# Patient Record
Sex: MALE | Race: WHITE | NOT HISPANIC OR LATINO | Employment: FULL TIME | URBAN - METROPOLITAN AREA
[De-identification: names, ages, dates, MRNs, and addresses within clinical notes are randomized per-mention and may not be internally consistent; named-entity substitution may affect disease eponyms.]

---

## 2017-07-07 ENCOUNTER — OFFICE VISIT (OUTPATIENT)
Dept: INTERNAL MEDICINE | Facility: CLINIC | Age: 47
End: 2017-07-07
Payer: COMMERCIAL

## 2017-07-07 ENCOUNTER — TELEPHONE (OUTPATIENT)
Dept: INTERNAL MEDICINE | Facility: CLINIC | Age: 47
End: 2017-07-07

## 2017-07-07 ENCOUNTER — PATIENT MESSAGE (OUTPATIENT)
Dept: INTERNAL MEDICINE | Facility: CLINIC | Age: 47
End: 2017-07-07

## 2017-07-07 VITALS
HEIGHT: 75 IN | DIASTOLIC BLOOD PRESSURE: 74 MMHG | WEIGHT: 208.56 LBS | TEMPERATURE: 98 F | SYSTOLIC BLOOD PRESSURE: 132 MMHG | BODY MASS INDEX: 25.93 KG/M2 | HEART RATE: 79 BPM | OXYGEN SATURATION: 99 %

## 2017-07-07 DIAGNOSIS — E11.9 TYPE 2 DIABETES MELLITUS WITHOUT COMPLICATION, WITHOUT LONG-TERM CURRENT USE OF INSULIN: ICD-10-CM

## 2017-07-07 DIAGNOSIS — L23.7 POISON IVY DERMATITIS: Primary | ICD-10-CM

## 2017-07-07 DIAGNOSIS — I10 ESSENTIAL HYPERTENSION: ICD-10-CM

## 2017-07-07 PROCEDURE — 4010F ACE/ARB THERAPY RXD/TAKEN: CPT | Mod: S$GLB,,, | Performed by: INTERNAL MEDICINE

## 2017-07-07 PROCEDURE — 99214 OFFICE O/P EST MOD 30 MIN: CPT | Mod: S$GLB,,, | Performed by: INTERNAL MEDICINE

## 2017-07-07 PROCEDURE — 3044F HG A1C LEVEL LT 7.0%: CPT | Mod: S$GLB,,, | Performed by: INTERNAL MEDICINE

## 2017-07-07 PROCEDURE — 99999 PR PBB SHADOW E&M-EST. PATIENT-LVL IV: CPT | Mod: PBBFAC,,, | Performed by: INTERNAL MEDICINE

## 2017-07-07 RX ORDER — PREDNISONE 20 MG/1
TABLET ORAL
Qty: 14 TABLET | Refills: 0 | Status: SHIPPED | OUTPATIENT
Start: 2017-07-07 | End: 2017-07-07 | Stop reason: SDUPTHER

## 2017-07-07 RX ORDER — PREDNISONE 20 MG/1
TABLET ORAL
Qty: 32 TABLET | Refills: 0
Start: 2017-07-07 | End: 2017-07-22

## 2017-07-07 NOTE — LETTER
July 7, 2017      Fadumo Rasheed MD  [address]  [facility] [address]           [facility] Medicine  [address]  [facility] [address]  Phone: [phone]  Fax: [phone]          Patient: Filiberto Cohn   MR Number: [id number]   YOB: 1970   Date of Visit: 7/7/2017       Dear Dr. Fadumo Rasheed:    Thank you for referring Filiberto Cohn to me for evaluation. Attached you will find relevant portions of my assessment and plan of care.    If you have questions, please do not hesitate to call me. I look forward to following Filiberto Cohn along with you.    Sincerely,    Sandeep Hernandez MD    Enclosure  CC:  No Recipients    If you would like to receive this communication electronically, please contact [email or web address] or [phone] to request more information on [employer] access.    For providers and/or their staff who would like to refer a patient to [facility], please contact us through our one-stop-shop provider referral line, [facility], [place], at [phone].    If you feel you have received this communication in error or would no longer like to receive these types of communications, please e-mail [email or web address]

## 2017-07-07 NOTE — Clinical Note
I saw Filiberto Cohn today for urgent visit for poison ivy dermatitis - see note for details, please contact me if any questions.  Thanks, Sandeep Hernandez MD

## 2017-07-07 NOTE — PROGRESS NOTES
"Subjective:       Patient ID: Filiberto Cohn is a 46 y.o. male.    Chief Complaint: Poison Marian    HPI  45 y/o man with h/o HTN, DM2 here for urgent visit for poison ivy exposure.    Poison ivy dermatitis - Was weeding backyard on 7/4, exposed to poison ivy. Was wearing long pants/socks/boots, not having any lesions on legs, but has this on arms, neck, back of head.  Using cold compresses, oatmeal-based lotion, caladryl lotion but areas of rash are spreading - started on wrist, then forearms and inner elbows, then neck. Was wearing short sleeves, hat.  Has washed clothes, bedding, other contact surfaces.   Has had severe poison ivy reaction once as a child - affected around eyes.     DM2 - controlled on last lab, A1c 6.8 7/19/16. On metformin 1000mg BID. Checks daily, following DM diet and exercising regularly.   Fasting BG usually 120-130s.     HTN - takes ramipril, checks at drugstore sometimes.     Review of Systems   Constitutional: Negative for activity change and fever.   HENT: Negative for trouble swallowing.    Eyes: Negative for visual disturbance.        Eyes "feel stiff"   Respiratory: Negative for cough, chest tightness and shortness of breath.    Cardiovascular: Negative for chest pain and palpitations.   Gastrointestinal: Negative.  Negative for abdominal pain.   Musculoskeletal: Negative.    Allergic/Immunologic: Positive for environmental allergies.   Neurological: Negative for weakness and numbness.   Hematological: Negative for adenopathy.   Psychiatric/Behavioral: Negative.          Past medical history, surgical history, and family medical history reviewed and updated as appropriate.    Medications and allergies reviewed.     Objective:          Vitals:    07/07/17 0952   BP: 132/74   BP Location: Left arm   Patient Position: Sitting   Pulse: 79   Temp: 98.1 °F (36.7 °C)   TempSrc: Oral   SpO2: 99%   Weight: 94.6 kg (208 lb 8.9 oz)   Height: 6' 3" (1.905 m)     Body mass index is 26.07 " kg/m².  Physical Exam   Constitutional: He is oriented to person, place, and time. He appears well-developed and well-nourished. No distress.   HENT:   Head: Normocephalic and atraumatic.       Right Ear: Tympanic membrane, external ear and ear canal normal.   Left Ear: Tympanic membrane, external ear and ear canal normal.   Mouth/Throat: Oropharynx is clear and moist.   Eyes: Conjunctivae and EOM are normal. Pupils are equal, round, and reactive to light.   No erythema or swelling around eyes   Neck: Neck supple.   Cardiovascular: Normal rate, regular rhythm and normal heart sounds.    Pulmonary/Chest: Effort normal. No respiratory distress.   Abdominal: Soft. There is no tenderness.   Musculoskeletal: Normal range of motion. He exhibits no edema.   Neurological: He is alert and oriented to person, place, and time. No cranial nerve deficit. Gait normal.   Skin: Skin is warm and dry. Rash noted.        Psychiatric: He has a normal mood and affect.   Vitals reviewed.      Lab Results   Component Value Date    WBC 5.55 07/19/2016    HGB 13.2 (L) 07/19/2016    HCT 38.0 (L) 07/19/2016     07/19/2016    CHOL 153 07/19/2016    TRIG 80 07/19/2016    HDL 38 (L) 07/19/2016    ALT 21 07/19/2016    AST 19 07/19/2016     07/19/2016    K 4.3 07/19/2016     07/19/2016    CREATININE 0.9 07/19/2016    BUN 14 07/19/2016    CO2 28 07/19/2016    TSH 2.135 07/19/2016    PSA 0.53 03/11/2015    HGBA1C 6.8 (H) 07/19/2016       Assessment:       1. Poison ivy dermatitis    2. Type 2 diabetes mellitus without complication, without long-term current use of insulin    3. Essential hypertension        Plan:   Filiberto was seen today for poison ivy.    Diagnoses and all orders for this visit:    Poison ivy dermatitis - spreading rash, now on head/neck, not controlled with topical medications. Given severity of rash will treat with PO steroids, though will give 14-day rather than 21-day course given his diabetes and risk of  hyperglycemia. He will watch for this and update clinic re: response of rash with steroid treatment.  Avoiding high-potency topical steroid given risk of atrophy with location of rash.  He will watch for signs/symptoms of any bacterial superinfection - none noted today.  -     predniSONE (DELTASONE) 20 MG tablet; Take 60mg daily x 7 days, then 40mg daily x 4 days, then 20mg x 3 days    Type 2 diabetes mellitus without complication, without long-term current use of insulin - counseled to watch for hyperglycemia as noted; he will contact clinic if CBG > 200. Continue metformin, watch diet carefully, drink copious water.     Essential hypertension - recommended also watch for elevation in BP with steroid treatment.     He will schedule regular follow up with his PCP.  Return if symptoms worsen or fail to improve.    Sandeep Hernandez MD  Internal Medicine  [facility]  7/7/2017

## 2017-07-07 NOTE — TELEPHONE ENCOUNTER
----- Message from Yeimy Yee sent at 7/7/2017 10:25 AM CDT -----  Contact: GERRY/Nataly [phone]  Pharmacy called and stated that regarding the RX predniSONE (DELTASONE) 20 MG tablet that was sent over today - the quantity is not enough to fulfill the instrustions.    Please call and advise.    Thank You

## 2017-07-07 NOTE — PATIENT INSTRUCTIONS
[facility]  Nurse Care Line Available For 24/7 Assistance    This service is free and available by calling [phone] or [phone].    [facility] provides appointment booking, health education and advisory services 24 hours a day, seven days a week. Specially trained registered nurses are available to discuss your health care concerns, to help you decide if your symptoms require going to the emergency room for assessment or a visit to a physician and to recommend appropriate self-care techniques.    Poison Ivy Rash  You have a rash and itching. This is a delayed reaction to the oils of the poison ivy plant. You likely came in contact with it during the 3 days before your symptoms began. Your skin will become red and itchy. Small blisters may appear. These can break and leak a clear yellow fluid. This fluid is not contagious. The reaction usually starts to go away after 1 to 2 weeks. But it may take 4 to 6 weeks to fully clear.    Home care  Follow these guidelines when caring for yourself at home:  · The plant oils still on your skin or clothes can be spread to other places on your body. They can also be passed on to other people and cause a similar reaction. Thats why its important to wash all of the plant oils off your skin and any clothes that may have been exposed. Wash all clothes that you were wearing. Use hot water with ordinary laundry detergent.  · Don't use over-the-counter creams that have neomycin or bacitracin. These may make the rash worse.  · Avoid anything that heats up your skin. This includes hot showers or baths, or direct sunlight. These can make itching worse.  · Put a cold compress on areas that are leaking (weeping), or on blistered areas. Do this for 30 minutes 3 times a day. To make a cold compress, dip a wash cloth in a mixture of 1 pint of cold water and 1 packet of astringent or oatmeal bath powder. Keep the solution in the refrigerator for future use.  · If large  areas of skin are affected, take a lukewarm bath. Add colloidal oatmeal, or 1 cup of cornstarch or baking soda to the water.  · For a rash in a smaller area, use hydrocortisone cream for redness and irritation. But dont use this if another medicine was prescribed. For severe itching, put an ice pack on the area. To make an ice pack, put ice cubes in a plastic bag that seals at the top. Wrap the bag in a clean, thin towel or cloth. Never put ice or an ice pack directly on the skin. Over-the-counter products that have calamine lotion may also be helpful.  · You can also use an oral antihistamine medicine with diphenhydramine for itching, unless another medicine was prescribed. This medicine may make you sleepy. So use lower doses during the daytime and higher doses at bedtime. Dont use medicine that has diphenhydramine if you have glaucoma. Also dont use it if you are a man who has trouble urinating because of an enlarged prostate. Antihistamines with loratidine cause less drowsiness. They are a good choice for daytime use.  · For severe cases, your provider may prescribe oral steroid medicines. Always take these exactly as prescribed.  Follow-up care  Follow up with your healthcare provider, or as directed. Call your provider if your rash gets worse or you are not starting to get better after 1 week of treatment.  When to seek medical advice  Call your healthcare provider right away if any of these occur:  · Spreading facial rash with swollen mouth or eyelids  · Rash that spreads to the groin and causes swelling of the penis, scrotum, or vaginal area  · Trouble urinating because of swelling in the genital area  Also call your provider if you have signs of infection in the areas of broken blisters:  · Spreading redness  · Pus or fluid draining from the blisters  · Yellow-brown crusts form over the open blisters  · Fever of 1 degree, or higher, above your normal temperature, or as directed by your provider  Call  911  Call 911 if you have severe swelling on your face, eyelids, mouth, throat, or tongue.  Date Last Reviewed: 8/1/2016  © 8442-7305 [employer]. [address] [place] [address], [place] [address]. All rights reserved. This information is not intended as a substitute for professional medical care. Always follow your healthcare professional's instructions.

## 2017-08-26 RX ORDER — PRAVASTATIN SODIUM 20 MG/1
TABLET ORAL
Qty: 90 TABLET | Refills: 3 | Status: SHIPPED | OUTPATIENT
Start: 2017-08-26 | End: 2017-10-04 | Stop reason: SDUPTHER

## 2017-09-19 RX ORDER — RAMIPRIL 5 MG/1
CAPSULE ORAL
Qty: 90 CAPSULE | Refills: 3 | Status: SHIPPED | OUTPATIENT
Start: 2017-09-19 | End: 2017-10-04 | Stop reason: SDUPTHER

## 2017-09-27 ENCOUNTER — PATIENT MESSAGE (OUTPATIENT)
Dept: INTERNAL MEDICINE | Facility: CLINIC | Age: 47
End: 2017-09-27

## 2017-09-27 DIAGNOSIS — E11.9 TYPE 2 DIABETES MELLITUS WITHOUT COMPLICATION, WITHOUT LONG-TERM CURRENT USE OF INSULIN: ICD-10-CM

## 2017-09-27 DIAGNOSIS — Z79.899 HISTORY OF LONG-TERM TREATMENT WITH HIGH-RISK MEDICATION: ICD-10-CM

## 2017-09-27 DIAGNOSIS — Z00.00 ANNUAL PHYSICAL EXAM: ICD-10-CM

## 2017-09-27 DIAGNOSIS — E78.2 MIXED HYPERLIPIDEMIA: ICD-10-CM

## 2017-09-27 DIAGNOSIS — Z12.5 SCREENING FOR PROSTATE CANCER: Primary | ICD-10-CM

## 2017-09-27 DIAGNOSIS — I10 ESSENTIAL HYPERTENSION: ICD-10-CM

## 2017-09-29 ENCOUNTER — LAB VISIT (OUTPATIENT)
Dept: LAB | Facility: HOSPITAL | Age: 47
End: 2017-09-29
Attending: INTERNAL MEDICINE
Payer: COMMERCIAL

## 2017-09-29 DIAGNOSIS — Z12.5 SCREENING FOR PROSTATE CANCER: ICD-10-CM

## 2017-09-29 DIAGNOSIS — E11.9 TYPE 2 DIABETES MELLITUS WITHOUT COMPLICATION, WITHOUT LONG-TERM CURRENT USE OF INSULIN: ICD-10-CM

## 2017-09-29 DIAGNOSIS — I10 ESSENTIAL HYPERTENSION: ICD-10-CM

## 2017-09-29 DIAGNOSIS — Z00.00 ANNUAL PHYSICAL EXAM: ICD-10-CM

## 2017-09-29 LAB
25(OH)D3+25(OH)D2 SERPL-MCNC: 28 NG/ML
ALBUMIN SERPL BCP-MCNC: 4.1 G/DL
ALP SERPL-CCNC: 47 U/L
ALT SERPL W/O P-5'-P-CCNC: 33 U/L
ANION GAP SERPL CALC-SCNC: 7 MMOL/L
AST SERPL-CCNC: 24 U/L
BASOPHILS # BLD AUTO: 0.03 K/UL
BASOPHILS NFR BLD: 0.6 %
BILIRUB SERPL-MCNC: 0.5 MG/DL
BUN SERPL-MCNC: 13 MG/DL
CALCIUM SERPL-MCNC: 9.5 MG/DL
CHLORIDE SERPL-SCNC: 105 MMOL/L
CHOLEST SERPL-MCNC: 120 MG/DL
CHOLEST/HDLC SERPL: 3.1 {RATIO}
CO2 SERPL-SCNC: 29 MMOL/L
COMPLEXED PSA SERPL-MCNC: 0.83 NG/ML
CREAT SERPL-MCNC: 0.8 MG/DL
DIFFERENTIAL METHOD: ABNORMAL
EOSINOPHIL # BLD AUTO: 0.1 K/UL
EOSINOPHIL NFR BLD: 2.2 %
ERYTHROCYTE [DISTWIDTH] IN BLOOD BY AUTOMATED COUNT: 12.7 %
EST. GFR  (AFRICAN AMERICAN): >60 ML/MIN/1.73 M^2
EST. GFR  (NON AFRICAN AMERICAN): >60 ML/MIN/1.73 M^2
ESTIMATED AVG GLUCOSE: 163 MG/DL
GLUCOSE SERPL-MCNC: 124 MG/DL
HBA1C MFR BLD HPLC: 7.3 %
HCT VFR BLD AUTO: 39.2 %
HDLC SERPL-MCNC: 39 MG/DL
HDLC SERPL: 32.5 %
HGB BLD-MCNC: 13.3 G/DL
LDLC SERPL CALC-MCNC: 72.2 MG/DL
LYMPHOCYTES # BLD AUTO: 1.7 K/UL
LYMPHOCYTES NFR BLD: 33.7 %
MCH RBC QN AUTO: 28.5 PG
MCHC RBC AUTO-ENTMCNC: 33.9 G/DL
MCV RBC AUTO: 84 FL
MONOCYTES # BLD AUTO: 0.6 K/UL
MONOCYTES NFR BLD: 11.3 %
NEUTROPHILS # BLD AUTO: 2.6 K/UL
NEUTROPHILS NFR BLD: 52.2 %
NONHDLC SERPL-MCNC: 81 MG/DL
PLATELET # BLD AUTO: 328 K/UL
PMV BLD AUTO: 10.7 FL
POTASSIUM SERPL-SCNC: 4.2 MMOL/L
PROT SERPL-MCNC: 7.9 G/DL
RBC # BLD AUTO: 4.67 M/UL
SODIUM SERPL-SCNC: 141 MMOL/L
TRIGL SERPL-MCNC: 44 MG/DL
TSH SERPL DL<=0.005 MIU/L-ACNC: 1.78 UIU/ML
URATE SERPL-MCNC: 4.7 MG/DL
VIT B12 SERPL-MCNC: 526 PG/ML
WBC # BLD AUTO: 4.96 K/UL

## 2017-09-29 PROCEDURE — 80053 COMPREHEN METABOLIC PANEL: CPT

## 2017-09-29 PROCEDURE — 85025 COMPLETE CBC W/AUTO DIFF WBC: CPT | Mod: PO

## 2017-09-29 PROCEDURE — 36415 COLL VENOUS BLD VENIPUNCTURE: CPT | Mod: PO

## 2017-09-29 PROCEDURE — 82607 VITAMIN B-12: CPT

## 2017-09-29 PROCEDURE — 84550 ASSAY OF BLOOD/URIC ACID: CPT

## 2017-09-29 PROCEDURE — 83036 HEMOGLOBIN GLYCOSYLATED A1C: CPT

## 2017-09-29 PROCEDURE — 82306 VITAMIN D 25 HYDROXY: CPT

## 2017-09-29 PROCEDURE — 80061 LIPID PANEL: CPT

## 2017-09-29 PROCEDURE — 84443 ASSAY THYROID STIM HORMONE: CPT

## 2017-09-29 PROCEDURE — 84153 ASSAY OF PSA TOTAL: CPT

## 2017-10-04 ENCOUNTER — OFFICE VISIT (OUTPATIENT)
Dept: INTERNAL MEDICINE | Facility: CLINIC | Age: 47
End: 2017-10-04
Payer: COMMERCIAL

## 2017-10-04 ENCOUNTER — IMMUNIZATION (OUTPATIENT)
Dept: INTERNAL MEDICINE | Facility: CLINIC | Age: 47
End: 2017-10-04
Payer: COMMERCIAL

## 2017-10-04 VITALS
BODY MASS INDEX: 26.35 KG/M2 | HEIGHT: 75 IN | HEART RATE: 84 BPM | OXYGEN SATURATION: 99 % | WEIGHT: 211.88 LBS | SYSTOLIC BLOOD PRESSURE: 110 MMHG | DIASTOLIC BLOOD PRESSURE: 60 MMHG

## 2017-10-04 DIAGNOSIS — Z23 NEED FOR 23-POLYVALENT PNEUMOCOCCAL POLYSACCHARIDE VACCINE: ICD-10-CM

## 2017-10-04 DIAGNOSIS — E78.2 MIXED HYPERLIPIDEMIA: ICD-10-CM

## 2017-10-04 DIAGNOSIS — Z23 NEEDS FLU SHOT: ICD-10-CM

## 2017-10-04 DIAGNOSIS — Z00.00 ANNUAL PHYSICAL EXAM: Primary | ICD-10-CM

## 2017-10-04 DIAGNOSIS — I10 ESSENTIAL HYPERTENSION: ICD-10-CM

## 2017-10-04 DIAGNOSIS — Z23 NEED FOR VACCINATION WITH 13-POLYVALENT PNEUMOCOCCAL CONJUGATE VACCINE: ICD-10-CM

## 2017-10-04 DIAGNOSIS — E11.9 TYPE 2 DIABETES MELLITUS WITHOUT COMPLICATION, WITHOUT LONG-TERM CURRENT USE OF INSULIN: ICD-10-CM

## 2017-10-04 PROCEDURE — 99999 PR PBB SHADOW E&M-EST. PATIENT-LVL IV: CPT | Mod: PBBFAC,,, | Performed by: INTERNAL MEDICINE

## 2017-10-04 PROCEDURE — 90471 IMMUNIZATION ADMIN: CPT | Mod: S$GLB,,, | Performed by: INTERNAL MEDICINE

## 2017-10-04 PROCEDURE — 99396 PREV VISIT EST AGE 40-64: CPT | Mod: 25,S$GLB,, | Performed by: INTERNAL MEDICINE

## 2017-10-04 PROCEDURE — 90686 IIV4 VACC NO PRSV 0.5 ML IM: CPT | Mod: S$GLB,,, | Performed by: INTERNAL MEDICINE

## 2017-10-04 PROCEDURE — 90472 IMMUNIZATION ADMIN EACH ADD: CPT | Mod: S$GLB,,, | Performed by: INTERNAL MEDICINE

## 2017-10-04 PROCEDURE — 90732 PPSV23 VACC 2 YRS+ SUBQ/IM: CPT | Mod: S$GLB,,, | Performed by: INTERNAL MEDICINE

## 2017-10-04 RX ORDER — PRAVASTATIN SODIUM 20 MG/1
20 TABLET ORAL EVERY MORNING
Qty: 90 TABLET | Refills: 3 | Status: SHIPPED | OUTPATIENT
Start: 2017-10-04 | End: 2018-11-15 | Stop reason: SDUPTHER

## 2017-10-04 RX ORDER — METFORMIN HYDROCHLORIDE 500 MG/1
TABLET ORAL
Qty: 360 TABLET | Refills: 3 | Status: SHIPPED | OUTPATIENT
Start: 2017-10-04 | End: 2018-11-15 | Stop reason: SDUPTHER

## 2017-10-04 RX ORDER — RAMIPRIL 5 MG/1
5 CAPSULE ORAL EVERY MORNING
Qty: 90 CAPSULE | Refills: 3 | Status: SHIPPED | OUTPATIENT
Start: 2017-10-04 | End: 2018-12-14 | Stop reason: ALTCHOICE

## 2017-10-04 NOTE — PATIENT INSTRUCTIONS
Sitagliptin oral tablet  What is this medicine?  SITAGLIPTIN (sit a GLIP tin) helps to treat type 2 diabetes. It helps to control blood sugar. Treatment is combined with diet and exercise.  How should I use this medicine?  Take this medicine by mouth with a glass of water. Follow the directions on the prescription label. You can take it with or without food. Do not cut, crush or chew this medicine. Take your dose at the same time each day. Do not take more often than directed. Do not stop taking except on your doctor's advice.  Talk to your pediatrician regarding the use of this medicine in children. Special care may be needed.  What side effects may I notice from receiving this medicine?  Side effects that you should report to your doctor or health care professional as soon as possible:  · allergic reactions like skin rash, itching or hives, swelling of the face, lips, or tongue  · breathing problems  · fever, chills  · joint pain  · loss of appetite  · signs and symptoms of low blood sugar such as feeling anxious, confusion, dizziness, increased hunger, unusually weak or tired, sweating, shakiness, cold, irritable, headache, blurred vision, fast heartbeat, loss of consciousness  · unusual stomach pain or discomfort  · vomiting  Side effects that usually do not require medical attention (report to your doctor or health care professional if they continue or are bothersome):  · diarrhea  · headache  · sore throat  · stomach upset  · stuffy or runny nose  What may interact with this medicine?  Do not take this medicine with any of the following medications:  · gatifloxacin  This medicine may also interact with the following medications:  · alcohol  · digoxin  · insulin  · sulfonylureas like glimepiride, glipizide, glyburide  What if I miss a dose?  If you miss a dose, take it as soon as you can. If it is almost time for your next dose, take only that dose. Do not take double or extra doses.  Where should I keep my  medicine?  Keep out of the reach of children.  Store at room temperature between 15 and 30 degrees C (59 and 86 degrees F). Throw away any unused medicine after the expiration date.  What should I tell my health care provider before I take this medicine?  They need to know if you have any of these conditions:  · diabetic ketoacidosis  · kidney disease  · pancreatitis  · previous swelling of the tongue, face, or lips with difficulty breathing, difficulty swallowing, hoarseness, or tightening of the throat  · type 1 diabetes  · an unusual or allergic reaction to sitagliptin, other medicines, foods, dyes, or preservatives  · pregnant or trying to get pregnant  · breast-feeding  What should I watch for while using this medicine?  Visit your doctor or health care professional for regular checks on your progress.  A test called the HbA1C (A1C) will be monitored. This is a simple blood test. It measures your blood sugar control over the last 2 to 3 months. You will receive this test every 3 to 6 months.  Learn how to check your blood sugar. Learn the symptoms of low and high blood sugar and how to manage them.  Always carry a quick-source of sugar with you in case you have symptoms of low blood sugar. Examples include hard sugar candy or glucose tablets. Make sure others know that you can choke if you eat or drink when you develop serious symptoms of low blood sugar, such as seizures or unconsciousness. They must get medical help at once.  Tell your doctor or health care professional if you have high blood sugar. You might need to change the dose of your medicine. If you are sick or exercising more than usual, you might need to change the dose of your medicine.  Do not skip meals. Ask your doctor or health care professional if you should avoid alcohol. Many nonprescription cough and cold products contain sugar or alcohol. These can affect blood sugar.  Wear a medical ID bracelet or chain, and carry a card that describes  your disease and details of your medicine and dosage times.  NOTE:This sheet is a summary. It may not cover all possible information. If you have questions about this medicine, talk to your doctor, pharmacist, or health care provider. Copyright© 2017 [employer]        Liraglutide injection  What is this medicine?  LIRAGLUTIDE (LIR a GLOO tide) is used to improve blood sugar control in adults with type 2 diabetes. This medicine may be used with other oral diabetes medicines.  How should I use this medicine?  This medicine is for injection under the skin of your upper leg, stomach area, or upper arm. You will be taught how to prepare and give this medicine. Use exactly as directed. Take your medicine at regular intervals. Do not take it more often than directed.  It is important that you put your used needles and syringes in a special sharps container. Do not put them in a trash can. If you do not have a sharps container, call your pharmacist or healthcare provider to get one.  A special MedGuide will be given to you by the pharmacist with each prescription and refill. Be sure to read this information carefully each time.  Talk to your pediatrician regarding the use of this medicine in children. Special care may be needed.  What side effects may I notice from receiving this medicine?  Side effects that you should report to your doctor or health care professional as soon as possible:  · allergic reactions like skin rash, itching or hives, swelling of the face, lips, or tongue  · breathing problems  · fever, chills  · loss of appetite  · signs and symptoms of low blood sugar such as feeling anxious, confusion, dizziness, increased hunger, unusually weak or tired, sweating, shakiness, cold, irritable, headache, blurred vision, fast heartbeat, loss of consciousness  · trouble passing urine or change in the amount of urine  · unusual stomach pain or upset  · vomiting  Side effects that usually do not require medical  attention (Report these to your doctor or health care professional if they continue or are bothersome.):  · constipation  · diarrhea  · fatigue  · headache  · nausea  What may interact with this medicine?  · acetaminophen  · atorvastatin  · birth control pills  · digoxin  · griseofulvin  · lisinoprilMany medications may cause changes in blood sugar, these include:  · alcohol containing beverages  · aspirin and aspirin-like drugs  · chloramphenicol  · chromium  · diuretics  · female hormones, such as estrogens or progestins, birth control pills  · heart medicines  · isoniazid  · male hormones or anabolic steroids  · medications for weight loss  · medicines for allergies, asthma, cold, or cough  · medicines for mental problems  · medicines called MAO inhibitors - Nardil, Parnate, Marplan, Eldepryl  · niacin  · NSAIDS, such as ibuprofen  · pentamidine  · phenytoin  · probenecid  · quinolone antibiotics such as ciprofloxacin, levofloxacin, ofloxacin  · some herbal dietary supplements  · steroid medicines such as prednisone or cortisone  · thyroid hormonesSome medications can hide the warning symptoms of low blood sugar (hypoglycemia). You may need to monitor your blood sugar more closely if you are taking one of these medications. These include:  · beta-blockers, often used for high blood pressure or heart problems (examples include atenolol, metoprolol, propranolol)  · clonidine  · guanethidine  · reserpine  What if I miss a dose?  If you miss a dose, take it as soon as you can. If it is almost time for your next dose, take only that dose. Do not take double or extra doses.  Where should I keep my medicine?  Keep out of the reach of children.  Store unopened pen in a refrigerator between 2 and 8 degrees C (36 and 46 degrees F). Do not freeze or use if the medicine has been frozen. Protect from light and excessive heat. After you first use the pen, it can be stored at room temperature between 15 and 30 degrees C (59 and  86 degrees F) or in a refrigerator. Throw away your used pen after 30 days or after the expiration date, whichever comes first.  Do not store your pen with the needle attached. If the needle is left on, medicine may leak from the pen.  What should I tell my health care provider before I take this medicine?  They need to know if you have any of these conditions:  · endocrine tumors (MEN 2) or if someone in your family had these tumors  · gallstones  · high cholesterol  · history of alcohol abuse problem  · history of pancreatitis  · kidney disease or if you are on dialysis  · liver disease  · previous swelling of the tongue, face, or lips with difficulty breathing, difficulty swallowing, hoarseness, or tightening of the throat  · stomach problems  · suicidal thoughts, plans, or attempt; a previous suicide attempt by you or a family member  · thyroid cancer or if someone in your family had thyroid cancer  · an unusual or allergic reaction to liraglutide, medicines, foods, dyes, or preservatives  · pregnant or trying to get pregnant  · breast-feeding  What should I watch for while using this medicine?  Visit your doctor or health care professional for regular checks on your progress.  A test called the HbA1C (A1C) will be monitored. This is a simple blood test. It measures your blood sugar control over the last 2 to 3 months. You will receive this test every 3 to 6 months.  Learn how to check your blood sugar. Learn the symptoms of low and high blood sugar and how to manage them.  Always carry a quick-source of sugar with you in case you have symptoms of low blood sugar. Examples include hard sugar candy or glucose tablets. Make sure others know that you can choke if you eat or drink when you develop serious symptoms of low blood sugar, such as seizures or unconsciousness. They must get medical help at once.  Tell your doctor or health care professional if you have high blood sugar. You might need to change the dose of  your medicine. If you are sick or exercising more than usual, you might need to change the dose of your medicine.  Do not skip meals. Ask your doctor or health care professional if you should avoid alcohol. Many nonprescription cough and cold products contain sugar or alcohol. These can affect blood sugar.  Liraglutide pens and cartridges should never be shared. Even if the needle is changed, sharing may result in passing of viruses like hepatitis or HIV.  Wear a medical ID bracelet or chain, and carry a card that describes your disease and details of your medicine and dosage times.  Patients and their families should watch out for worsening depression or thoughts of suicide. Also watch out for sudden changes in feelings such as feeling anxious, agitated, panicky, irritable, hostile, aggressive, impulsive, severely restless, overly excited and hyperactive, or not being able to sleep. If this happens, especially at the beginning of treatment or after a change in dose, call your health care professional.  NOTE:This sheet is a summary. It may not cover all possible information. If you have questions about this medicine, talk to your doctor, pharmacist, or health care provider. Copyright© 2017 [employer]

## 2017-10-05 NOTE — PROGRESS NOTES
Subjective:       Patient ID: Filiberto Cohn is a 47 y.o. male.    Chief Complaint: Annual Exam  wellness  Entire chart reviewed, PMx, FHx, and Social History updated and reviewed.  HPI  Review of Systems   Constitutional: Negative for activity change, appetite change, chills, fatigue, fever and unexpected weight change.   HENT: Negative for dental problem, hearing loss, rhinorrhea, tinnitus, trouble swallowing and voice change.    Eyes: Negative for discharge and visual disturbance.   Respiratory: Negative for cough, chest tightness, shortness of breath and wheezing.    Cardiovascular: Negative for chest pain, palpitations and leg swelling.   Gastrointestinal: Negative for abdominal pain, blood in stool, constipation, diarrhea, nausea and vomiting.   Endocrine: Negative for polydipsia and polyuria.   Genitourinary: Negative for difficulty urinating, dysuria, flank pain, frequency, hematuria and urgency.   Musculoskeletal: Negative for arthralgias, back pain, gait problem, joint swelling, myalgias, neck pain and neck stiffness.   Skin: Negative for rash.   Neurological: Negative for tremors, weakness, light-headedness, numbness and headaches.   Psychiatric/Behavioral: Negative for confusion, dysphoric mood and sleep disturbance. The patient is not nervous/anxious.        Objective:      Physical Exam   Constitutional: He is oriented to person, place, and time. He appears well-developed and well-nourished. No distress.   HENT:   Head: Atraumatic.   Mouth/Throat: No oropharyngeal exudate.   Eyes: Conjunctivae are normal. No scleral icterus.   Cardiovascular: Normal rate, regular rhythm and normal heart sounds.    Pulmonary/Chest: Effort normal and breath sounds normal.   Abdominal: Soft. There is no tenderness.   Musculoskeletal: He exhibits no edema.   Protective Sensation (w/ 10 gram monofilament):  Right: Intact  Left: Intact    Visual Inspection:  Normal -  Bilateral    Pedal Pulses:   Right: Present  Left:  Present    Posterior tibialis:   Right:Present  Left: Present   Lymphadenopathy:     He has no cervical adenopathy.   Neurological: He is alert and oriented to person, place, and time.   Skin: Skin is warm and dry.   Psychiatric: He has a normal mood and affect. His behavior is normal.       Assessment:       1. Annual physical exam    2. Type 2 diabetes mellitus without complication, without long-term current use of insulin    3. Mixed hyperlipidemia    4. Essential hypertension    5. Needs flu shot    6. Need for vaccination with 13-polyvalent pneumococcal conjugate vaccine    7. Need for 23-polyvalent pneumococcal polysaccharide vaccine        Plan:   Filiberto was seen today for annual exam.    Diagnoses and all orders for this visit:    Annual physical exam    Type 2 diabetes mellitus without complication, without long-term current use of insulin.  -     Hemoglobin A1c; Future    Mixed hyperlipidemia    Essential hypertension    Needs flu shot    Need for vaccination with 13-polyvalent pneumococcal conjugate vaccine    Need for 23-polyvalent pneumococcal polysaccharide vaccine  -     (In Office Administered) Pneumococcal Polysaccharide Vaccine (23 Valent) (SQ/IM)    Other orders  -     Cancel: (In Office Administered) Pneumococcal Conjugate Vaccine (13 Valent) (IM)  -     ramipril (ALTACE) 5 MG capsule; Take 1 capsule (5 mg total) by mouth every morning.  -     pravastatin (PRAVACHOL) 20 MG tablet; Take 1 tablet (20 mg total) by mouth every morning.  -     metformin (GLUCOPHAGE) 500 MG tablet; Four tablets with breakfast    Return in about 1 year (around 10/4/2018) for for PE.

## 2017-12-29 ENCOUNTER — PATIENT MESSAGE (OUTPATIENT)
Dept: INTERNAL MEDICINE | Facility: CLINIC | Age: 47
End: 2017-12-29

## 2017-12-29 DIAGNOSIS — R11.2 NAUSEA AND VOMITING, INTRACTABILITY OF VOMITING NOT SPECIFIED, UNSPECIFIED VOMITING TYPE: Primary | ICD-10-CM

## 2017-12-29 RX ORDER — ONDANSETRON 4 MG/1
TABLET, FILM COATED ORAL
Qty: 20 TABLET | Refills: 0 | Status: SHIPPED | OUTPATIENT
Start: 2017-12-29 | End: 2019-05-21

## 2018-10-01 DIAGNOSIS — E11.9 TYPE 2 DIABETES MELLITUS WITHOUT COMPLICATION: ICD-10-CM

## 2018-11-15 RX ORDER — PRAVASTATIN SODIUM 20 MG/1
TABLET ORAL
Qty: 90 TABLET | Refills: 3 | Status: SHIPPED | OUTPATIENT
Start: 2018-11-15 | End: 2019-11-02 | Stop reason: SDUPTHER

## 2018-11-15 RX ORDER — METFORMIN HYDROCHLORIDE 500 MG/1
TABLET ORAL
Qty: 360 TABLET | Refills: 3 | Status: SHIPPED | OUTPATIENT
Start: 2018-11-15 | End: 2019-12-09 | Stop reason: SDUPTHER

## 2018-12-14 RX ORDER — RAMIPRIL 5 MG/1
CAPSULE ORAL
Qty: 90 CAPSULE | Refills: 3 | OUTPATIENT
Start: 2018-12-14

## 2018-12-14 RX ORDER — LOSARTAN POTASSIUM 50 MG/1
50 TABLET ORAL DAILY
Qty: 90 TABLET | Refills: 3 | Status: SHIPPED | OUTPATIENT
Start: 2018-12-14 | End: 2019-12-09 | Stop reason: SDUPTHER

## 2019-05-14 ENCOUNTER — PATIENT MESSAGE (OUTPATIENT)
Dept: INTERNAL MEDICINE | Facility: CLINIC | Age: 49
End: 2019-05-14

## 2019-05-14 DIAGNOSIS — E11.9 TYPE 2 DIABETES MELLITUS WITHOUT COMPLICATION, WITHOUT LONG-TERM CURRENT USE OF INSULIN: ICD-10-CM

## 2019-05-14 DIAGNOSIS — Z79.899 HISTORY OF LONG-TERM TREATMENT WITH HIGH-RISK MEDICATION: ICD-10-CM

## 2019-05-14 DIAGNOSIS — E78.2 MIXED HYPERLIPIDEMIA: ICD-10-CM

## 2019-05-14 DIAGNOSIS — I10 ESSENTIAL HYPERTENSION: ICD-10-CM

## 2019-05-14 DIAGNOSIS — Z12.5 SCREENING FOR PROSTATE CANCER: ICD-10-CM

## 2019-05-14 DIAGNOSIS — E55.9 MILD VITAMIN D DEFICIENCY: ICD-10-CM

## 2019-05-14 DIAGNOSIS — Z00.00 ANNUAL PHYSICAL EXAM: Primary | ICD-10-CM

## 2019-05-17 ENCOUNTER — LAB VISIT (OUTPATIENT)
Dept: LAB | Facility: HOSPITAL | Age: 49
End: 2019-05-17
Attending: INTERNAL MEDICINE
Payer: COMMERCIAL

## 2019-05-17 DIAGNOSIS — Z12.5 SCREENING FOR PROSTATE CANCER: ICD-10-CM

## 2019-05-17 DIAGNOSIS — E78.2 MIXED HYPERLIPIDEMIA: ICD-10-CM

## 2019-05-17 DIAGNOSIS — Z00.00 ANNUAL PHYSICAL EXAM: ICD-10-CM

## 2019-05-17 DIAGNOSIS — E11.9 TYPE 2 DIABETES MELLITUS WITHOUT COMPLICATION, WITHOUT LONG-TERM CURRENT USE OF INSULIN: ICD-10-CM

## 2019-05-17 DIAGNOSIS — Z79.899 HISTORY OF LONG-TERM TREATMENT WITH HIGH-RISK MEDICATION: ICD-10-CM

## 2019-05-17 DIAGNOSIS — E55.9 MILD VITAMIN D DEFICIENCY: ICD-10-CM

## 2019-05-17 DIAGNOSIS — I10 ESSENTIAL HYPERTENSION: ICD-10-CM

## 2019-05-17 LAB
25(OH)D3+25(OH)D2 SERPL-MCNC: 27 NG/ML (ref 30–96)
ALBUMIN SERPL BCP-MCNC: 4.1 G/DL (ref 3.5–5.2)
ALP SERPL-CCNC: 67 U/L (ref 55–135)
ALT SERPL W/O P-5'-P-CCNC: 35 U/L (ref 10–44)
ANION GAP SERPL CALC-SCNC: 8 MMOL/L (ref 8–16)
AST SERPL-CCNC: 20 U/L (ref 10–40)
BASOPHILS # BLD AUTO: 0.03 K/UL (ref 0–0.2)
BASOPHILS NFR BLD: 0.6 % (ref 0–1.9)
BILIRUB SERPL-MCNC: 0.2 MG/DL (ref 0.1–1)
BUN SERPL-MCNC: 11 MG/DL (ref 6–20)
CALCIUM SERPL-MCNC: 9.2 MG/DL (ref 8.7–10.5)
CHLORIDE SERPL-SCNC: 101 MMOL/L (ref 95–110)
CHOLEST SERPL-MCNC: 132 MG/DL (ref 120–199)
CHOLEST/HDLC SERPL: 3.6 {RATIO} (ref 2–5)
CO2 SERPL-SCNC: 27 MMOL/L (ref 23–29)
COMPLEXED PSA SERPL-MCNC: 0.64 NG/ML (ref 0–4)
CREAT SERPL-MCNC: 0.8 MG/DL (ref 0.5–1.4)
DIFFERENTIAL METHOD: ABNORMAL
EOSINOPHIL # BLD AUTO: 0.2 K/UL (ref 0–0.5)
EOSINOPHIL NFR BLD: 3.2 % (ref 0–8)
ERYTHROCYTE [DISTWIDTH] IN BLOOD BY AUTOMATED COUNT: 12.1 % (ref 11.5–14.5)
EST. GFR  (AFRICAN AMERICAN): >60 ML/MIN/1.73 M^2
EST. GFR  (NON AFRICAN AMERICAN): >60 ML/MIN/1.73 M^2
ESTIMATED AVG GLUCOSE: 243 MG/DL (ref 68–131)
GLUCOSE SERPL-MCNC: 211 MG/DL (ref 70–110)
HBA1C MFR BLD HPLC: 10.1 % (ref 4–5.6)
HCT VFR BLD AUTO: 38.8 % (ref 40–54)
HDLC SERPL-MCNC: 37 MG/DL (ref 40–75)
HDLC SERPL: 28 % (ref 20–50)
HGB BLD-MCNC: 13.2 G/DL (ref 14–18)
LDLC SERPL CALC-MCNC: 74.2 MG/DL (ref 63–159)
LYMPHOCYTES # BLD AUTO: 1.9 K/UL (ref 1–4.8)
LYMPHOCYTES NFR BLD: 35.8 % (ref 18–48)
MCH RBC QN AUTO: 28.3 PG (ref 27–31)
MCHC RBC AUTO-ENTMCNC: 34 G/DL (ref 32–36)
MCV RBC AUTO: 83 FL (ref 82–98)
MONOCYTES # BLD AUTO: 0.5 K/UL (ref 0.3–1)
MONOCYTES NFR BLD: 9 % (ref 4–15)
NEUTROPHILS # BLD AUTO: 2.7 K/UL (ref 1.8–7.7)
NEUTROPHILS NFR BLD: 51.2 % (ref 38–73)
NONHDLC SERPL-MCNC: 95 MG/DL
PLATELET # BLD AUTO: 253 K/UL (ref 150–350)
PMV BLD AUTO: 10.6 FL (ref 9.2–12.9)
POTASSIUM SERPL-SCNC: 4.1 MMOL/L (ref 3.5–5.1)
PROT SERPL-MCNC: 7.6 G/DL (ref 6–8.4)
RBC # BLD AUTO: 4.67 M/UL (ref 4.6–6.2)
SODIUM SERPL-SCNC: 136 MMOL/L (ref 136–145)
TRIGL SERPL-MCNC: 104 MG/DL (ref 30–150)
TSH SERPL DL<=0.005 MIU/L-ACNC: 1.27 UIU/ML (ref 0.4–4)
VIT B12 SERPL-MCNC: 317 PG/ML (ref 210–950)
WBC # BLD AUTO: 5.34 K/UL (ref 3.9–12.7)

## 2019-05-17 PROCEDURE — 36415 COLL VENOUS BLD VENIPUNCTURE: CPT

## 2019-05-17 PROCEDURE — 82306 VITAMIN D 25 HYDROXY: CPT

## 2019-05-17 PROCEDURE — 85025 COMPLETE CBC W/AUTO DIFF WBC: CPT

## 2019-05-17 PROCEDURE — 80061 LIPID PANEL: CPT

## 2019-05-17 PROCEDURE — 84153 ASSAY OF PSA TOTAL: CPT

## 2019-05-17 PROCEDURE — 80053 COMPREHEN METABOLIC PANEL: CPT

## 2019-05-17 PROCEDURE — 84443 ASSAY THYROID STIM HORMONE: CPT

## 2019-05-17 PROCEDURE — 83036 HEMOGLOBIN GLYCOSYLATED A1C: CPT

## 2019-05-17 PROCEDURE — 82607 VITAMIN B-12: CPT

## 2019-05-20 ENCOUNTER — PATIENT OUTREACH (OUTPATIENT)
Dept: ADMINISTRATIVE | Facility: HOSPITAL | Age: 49
End: 2019-05-20

## 2019-05-20 DIAGNOSIS — E11.9 TYPE 2 DIABETES MELLITUS WITHOUT COMPLICATION, WITHOUT LONG-TERM CURRENT USE OF INSULIN: Primary | ICD-10-CM

## 2019-05-20 NOTE — PROGRESS NOTES
Attempted to outreach to pt. No answer, left message for return call. Pt recent A1c was 10.1. Outreach attempt was to discuss and offer diabetes education. Pt may be due for eye exam. He has seen outside provider in the past- Dr. Rubio at [facility]. Record request faxed for most recent exam record. Pt also due for urine micro, order placed and foot exam needs to be completed. Chart review also completed. Immunizations reconciled, HM modifiers updated, HM duplicate entries deleted, care team updated, old orders deleted.

## 2019-05-20 NOTE — Clinical Note
Keith Rasheed, this pt is scheduled to see you this afternoon. I spoke to him briefly re: his recent A1c (10.1) and suggested diabetes education. He is agrees he should go. Pt says he will schedule today at check out. Can you please mention/encourage at your visit today? Thanks- Hannah

## 2019-05-20 NOTE — LETTER
May 21, 2019    Dr. Amadou Rubio  [facility]             [facility]  [address] [place] [address] May 21, 2019     Patient: Filiberto Cohn    YOB: 1970   Date of Visit: 5/20/2019     To Whom It May Concern:    The patient listed above was seen recently by his primary care provider, Dr. Fadumo Rasheed, at [facility]. Please release the following information specified below for the patient:    Most recent eye exam results    Please fax the requested record to our secure fax number [phone], Attention: JACK Young.    Thank you for your help! If I can be of any assistance please contact me at the number listed below.      Regards,    Hannah Singh LPN  Clinical Care Coordinator  [facility]  [phone] phone  [phone] fax  [email or web address]

## 2019-05-21 ENCOUNTER — OFFICE VISIT (OUTPATIENT)
Dept: INTERNAL MEDICINE | Facility: CLINIC | Age: 49
End: 2019-05-21
Payer: COMMERCIAL

## 2019-05-21 VITALS
BODY MASS INDEX: 25.88 KG/M2 | HEIGHT: 75 IN | DIASTOLIC BLOOD PRESSURE: 80 MMHG | HEART RATE: 93 BPM | WEIGHT: 208.13 LBS | OXYGEN SATURATION: 97 % | SYSTOLIC BLOOD PRESSURE: 116 MMHG

## 2019-05-21 DIAGNOSIS — E11.65 TYPE 2 DIABETES MELLITUS WITH HYPERGLYCEMIA, WITHOUT LONG-TERM CURRENT USE OF INSULIN: ICD-10-CM

## 2019-05-21 DIAGNOSIS — Z00.00 ANNUAL PHYSICAL EXAM: Primary | ICD-10-CM

## 2019-05-21 DIAGNOSIS — I10 ESSENTIAL HYPERTENSION: ICD-10-CM

## 2019-05-21 DIAGNOSIS — M65.332 TRIGGER MIDDLE FINGER OF LEFT HAND: ICD-10-CM

## 2019-05-21 DIAGNOSIS — R73.9 HYPERGLYCEMIA: ICD-10-CM

## 2019-05-21 DIAGNOSIS — F98.8 ATTENTION DEFICIT DISORDER, UNSPECIFIED HYPERACTIVITY PRESENCE: ICD-10-CM

## 2019-05-21 PROCEDURE — 3074F SYST BP LT 130 MM HG: CPT | Mod: CPTII,S$GLB,, | Performed by: INTERNAL MEDICINE

## 2019-05-21 PROCEDURE — 99396 PREV VISIT EST AGE 40-64: CPT | Mod: S$GLB,,, | Performed by: INTERNAL MEDICINE

## 2019-05-21 PROCEDURE — 99999 PR PBB SHADOW E&M-EST. PATIENT-LVL III: CPT | Mod: PBBFAC,,, | Performed by: INTERNAL MEDICINE

## 2019-05-21 PROCEDURE — 3046F HEMOGLOBIN A1C LEVEL >9.0%: CPT | Mod: CPTII,S$GLB,, | Performed by: INTERNAL MEDICINE

## 2019-05-21 PROCEDURE — 99396 PR PREVENTIVE VISIT,EST,40-64: ICD-10-PCS | Mod: S$GLB,,, | Performed by: INTERNAL MEDICINE

## 2019-05-21 PROCEDURE — 3079F DIAST BP 80-89 MM HG: CPT | Mod: CPTII,S$GLB,, | Performed by: INTERNAL MEDICINE

## 2019-05-21 PROCEDURE — 3074F PR MOST RECENT SYSTOLIC BLOOD PRESSURE < 130 MM HG: ICD-10-PCS | Mod: CPTII,S$GLB,, | Performed by: INTERNAL MEDICINE

## 2019-05-21 PROCEDURE — 3079F PR MOST RECENT DIASTOLIC BLOOD PRESSURE 80-89 MM HG: ICD-10-PCS | Mod: CPTII,S$GLB,, | Performed by: INTERNAL MEDICINE

## 2019-05-21 PROCEDURE — 3046F PR MOST RECENT HEMOGLOBIN A1C LEVEL > 9.0%: ICD-10-PCS | Mod: CPTII,S$GLB,, | Performed by: INTERNAL MEDICINE

## 2019-05-21 PROCEDURE — 99999 PR PBB SHADOW E&M-EST. PATIENT-LVL III: ICD-10-PCS | Mod: PBBFAC,,, | Performed by: INTERNAL MEDICINE

## 2019-05-21 NOTE — PROGRESS NOTES
Subjective:       Patient ID: Filiberto Cohn is a 48 y.o. male.    Chief Complaint: Annual Exam  wellness    Last visit 10-    Overall, he enjoys good health.  Recently developed a problem with a left trigger finger he plays guitar.      He noticed his fasting  blood sugar begin to rise in January.  He checks his blood sugar every morning with a goal fasting blood sugar of less than 140.  About 3 months ago his fasting blood sugar was quite elevated to 230-250.  He does not have any symptoms of hyperglycemia.  He takes metformin 2 g with breakfast every morning.  He lost 30 lb quickly but has not been able to get under 200 lb.  His goal weight is 190 lb.  He has a healthy diet.  The missing piece is exercise.  He he sweats a lot when he exercises and takes a long time to cool down so it is difficult to find time in the day to get exercise in.  He is thinking about a sport, maybe softball.  He does not know if his wife would be interested in folk dancing or something like that.    He has no numbness tingling or burning in his feet.  He had a problem of picking but that has stopped.  He does not have any difficulty with sexual function.  Diabetes Management Status    Statin: Taking  ACE/ARB: Not taking    Screening or Prevention Patient's value Goal Complete/Controlled?   HgA1C Testing and Control   Lab Results   Component Value Date    HGBA1C 10.1 (H) 05/17/2019      Annually/Less than 8% No   Lipid profile : 05/17/2019 Annually Yes   LDL control Lab Results   Component Value Date    LDLCALC 74.2 05/17/2019    Annually/Less than 100 mg/dl  Yes   Nephropathy screening Lab Results   Component Value Date    LABMICR 34.0 03/13/2015     Lab Results   Component Value Date    PROTEINUA Negative 03/12/2015    Annually No   Blood pressure BP Readings from Last 1 Encounters:   05/21/19 116/80    Less than 140/90 Yes   Dilated retinal exam : 04/03/2017 Annually Yes   Foot exam   : 10/04/2017 Annually No     Hemoglobin A1C    Date Value Ref Range Status   05/17/2019 10.1 (H) 4.0 - 5.6 % Final     Comment:     ADA Screening Guidelines:  5.7-6.4%  Consistent with prediabetes  >or=6.5%  Consistent with diabetes  High levels of fetal hemoglobin interfere with the HbA1C  assay. Heterozygous hemoglobin variants (HbS, HgC, etc)do  not significantly interfere with this assay.   However, presence of multiple variants may affect accuracy.     09/29/2017 7.3 (H) 4.0 - 5.6 % Final     Comment:     According to ADA guidelines, hemoglobin A1c <7.0% represents  optimal control in non-pregnant diabetic patients. Different  metrics may apply to specific patient populations.   Standards of Medical Care in Diabetes-2016.  For the purpose of screening for the presence of diabetes:  <5.7%     Consistent with the absence of diabetes  5.7-6.4%  Consistent with increasing risk for diabetes   (prediabetes)  >or=6.5%  Consistent with diabetes  Currently, no consensus exists for use of hemoglobin A1c  for diagnosis of diabetes for children.  This Hemoglobin A1c assay has significant interference with fetal   hemoglobin   (HbF). The results are invalid for patients with abnormal amounts of   HbF,   including those with known Hereditary Persistence   of Fetal Hemoglobin. Heterozygous hemoglobin variants (HbAS, HbAC,   HbAD, HbAE, HbA2) do not significantly interfere with this assay;   however, presence of multiple variants in a sample may impact the %   interference.     07/19/2016 6.8 (H) 4.5 - 6.2 % Final     Comment:     According to ADA guidelines, hemoglobin A1C <7.0% represents  optimal control in non-pregnant diabetic patients.  Different  metrics may apply to specific populations.   Standards of Medical Care in Diabetes - 2016.  For the purpose of screening for the presence of diabetes:  <5.7%     Consistent with the absence of diabetes  5.7-6.4%  Consistent with increasing risk for diabetes   (prediabetes)  >or=6.5%  Consistent with diabetes  Currently no  consensus exists for use of hemoglobin A1C  for diagnosis of diabetes for children.         HPI  Review of Systems   Constitutional: Negative for activity change, appetite change, chills, fatigue, fever and unexpected weight change.   HENT: Negative for dental problem, hearing loss, tinnitus, trouble swallowing and voice change.    Eyes: Negative for visual disturbance.   Respiratory: Negative for cough, chest tightness, shortness of breath and wheezing.    Cardiovascular: Negative for chest pain, palpitations and leg swelling.   Gastrointestinal: Negative for abdominal pain, blood in stool, constipation, diarrhea, nausea and vomiting.   Genitourinary: Negative for difficulty urinating, dysuria, flank pain, frequency and urgency.   Musculoskeletal: Negative for arthralgias, back pain, gait problem, joint swelling, myalgias, neck pain and neck stiffness.   Skin: Negative for rash.   Neurological: Negative for tremors, light-headedness, numbness and headaches.   Psychiatric/Behavioral: Negative for dysphoric mood and sleep disturbance. The patient is not nervous/anxious.        Objective:      Physical Exam   Constitutional: He is oriented to person, place, and time. No distress.   HENT:   Head: Atraumatic.   Eyes: Conjunctivae are normal. No scleral icterus.   Neck: Neck supple.   Cardiovascular: Normal rate and regular rhythm.   Pulmonary/Chest: Effort normal and breath sounds normal.   Abdominal: Soft. There is no tenderness.   Musculoskeletal: He exhibits no edema.   Lymphadenopathy:     He has no cervical adenopathy.   Neurological: He is alert and oriented to person, place, and time.   Skin: Skin is warm and dry.   Psychiatric: He has a normal mood and affect. His behavior is normal.       Assessment:       1. Annual physical exam    2. Hyperglycemia    3. Attention deficit disorder, unspecified hyperactivity presence    4. Type 2 diabetes mellitus with hyperglycemia, without long-term current use of insulin     5. Essential hypertension    6. Trigger middle finger of left hand        Plan:   Filiberto was seen today for annual exam.    Diagnoses and all orders for this visit:    Annual physical exam    Hyperglycemia  -     canagliflozin (INVOKANA) 100 mg Tab; Take 1 tablet (100 mg total) by mouth once daily.    Attention deficit disorder, unspecified hyperactivity presence    Type 2 diabetes mellitus with hyperglycemia, without long-term current use of insulin.      He has now failed metformin and lifestyle changes alone.  He will add in exercise and continue his healthy diet and try to get to his goal weight of 190 lb.  The next step would be to add an agent and it looks like his company, insurance company, is going to approve Invokana but I am not sure with the price is going to be and I am going to ask our pharmacy to see if they can help us with this problem.  The patient is going to continue to check his fasting blood sugar every morning and e-mail this information to me once a week.  We are going to try to manage this by e-mail and the patient will follow up in 6 months, sooner if there is a problem.  He is given information on this new medication and understands the risk of yeast infection, urinary tract infection and ketoacidosis.  He will call if there are any problems with this new medication.  -     canagliflozin (INVOKANA) 100 mg Tab; Take 1 tablet (100 mg total) by mouth once daily.  -     Hemoglobin A1c; Future    Essential hypertension, blood pressure has been very well controlled over the past year.    Trigger middle finger of left hand, he might have to have surgery.      Medication List with Changes/Refills   New Medications    CANAGLIFLOZIN (INVOKANA) 100 MG TAB    Take 1 tablet (100 mg total) by mouth once daily.   Current Medications    DEXTROAMPHETAMINE-AMPHETAMINE (ADDERALL XR) 15 MG 24 HR CAPSULE    Take 15 mg by mouth every morning.    HYOSCYAMINE (ANASPAZ,LEVSIN) 0.125 MG TAB    Take 1 tablet (125  mcg total) by mouth every 4 (four) hours as needed.    LORATADINE (CLARITIN) 10 MG TABLET    Take 10 mg by mouth once daily.      LOSARTAN (COZAAR) 50 MG TABLET    Take 1 tablet (50 mg total) by mouth once daily. Replaces ramipril 5 mg which is duscontinued    METFORMIN (GLUCOPHAGE) 500 MG TABLET    TAKE 4 TABLETS WITH BREAKFAST    PRAVASTATIN (PRAVACHOL) 20 MG TABLET    TAKE 1 TABLET EVERY MORNING   Discontinued Medications    ONDANSETRON (ZOFRAN) 4 MG TABLET    One to two tablets by mouth as needed for nausea and vomiting.    ONDANSETRON (ZOFRAN-ODT) 4 MG TBDL    Take 1 tablet (4 mg total) by mouth every 8 (eight) hours as needed.       Follow up in about 6 months (around 11/21/2019) for also one year for physical.

## 2019-05-21 NOTE — PATIENT INSTRUCTIONS
Canagliflozin oral tablets  What is this medicine?  CANAGLIFLOZIN (CAMDEN a gli FLOE zin) helps to treat type 2 diabetes. It helps to control blood sugar. Treatment is combined with diet and exercise.  How should I use this medicine?  Take this medicine by mouth with a glass of water. Follow the directions on the prescription label. Take it before the first meal of the day. Take your dose at the same time each day. Do not take more often than directed. Do not stop taking except on your doctor's advice.  A special MedGuide will be given to you by the pharmacist with each prescription and refill. Be sure to read this information carefully each time.  Talk to your pediatrician regarding the use of this medicine in children. Special care may be needed.  What side effects may I notice from receiving this medicine?  Side effects that you should report to your doctor or health care professional as soon as possible:  · allergic reactions like skin rash, itching or hives, swelling of the face, lips, or tongue  · breathing problems  · chest pain  · dizziness  · fast or irregular heartbeat  · feeling faint or lightheaded, falls  · muscle weakness  · nausea, vomiting, unusual stomach upset or pain  · new pain or tenderness, change in skin color, sores or ulcers, or infection in legs or feet  · signs and symptoms of low blood sugar such as feeling anxious, confusion, dizziness, increased hunger, unusually weak or tired, sweating, shakiness, cold, irritable, headache, blurred vision, fast heartbeat, loss of consciousness  · signs and symptoms of a urinary tract infection, such as fever, chills, a burning feeling when urinating, blood in the urine, back pain  · trouble passing urine or change in the amount of urine, including an urgent need to urinate more often, in larger amounts, or at night  · penile discharge, itching, or pain in men  · unusual tiredness  · vaginal discharge, itching, or odor in women  Side effects that usually  do not require medical attention (Report these to your doctor or health care professional if they continue or are bothersome.):  · constipation  · mild increase in urination  · thirsty  What may interact with this medicine?  Do not take this medicine with any of the following medications:  · gatifloxacinThis medicine may also interact with the following medications:  · alcohol  · certain medicines for blood pressure, heart disease  · digoxin  · diuretics  · insulin  · nateglinide  · phenobarbital  · phenytoin  · repaglinide  · rifampin  · ritonavir  · sulfonylureas like glimepiride, glipizide, glyburide  What if I miss a dose?  If you miss a dose, take it as soon as you can. If it is almost time for your next dose, take only that dose. Do not take double or extra doses.  Where should I keep my medicine?  Keep out of the reach of children.  Store at room temperature between 20 and 25 degrees C (68 and 77 degrees F). Throw away any unused medicine after the expiration date.  What should I tell my health care provider before I take this medicine?  They need to know if you have any of these conditions:  · dehydration  · diabetic ketoacidosis  · diet low in salt  · eating less due to illness, surgery, dieting, or any other reason  · having surgery  · high cholesterol  · high levels of potassium in the blood  · history of pancreatitis or pancreas problems  · history of yeast infection of the penis or vagina  · if you often drink alcohol  · infections in the bladder, kidneys, or urinary tract  · kidney disease  · liver disease  · low blood pressure  · on hemodialysis  · problems urinating  · type 1 diabetes  · uncircumcised male  · an unusual or allergic reaction to canagliflozin, other medicines, foods, dyes, or preservatives  · pregnant or trying to get pregnant  · breast-feeding  What should I watch for while using this medicine?  Visit your doctor or health care professional for regular checks on your progress.  This  medicine can cause a serious condition in which there is too much acid in the blood. If you develop nausea, vomiting, stomach pain, unusual tiredness, or breathing problems, stop taking this medicine and call your doctor right away. If possible, use a ketone dipstick to check for ketones in your urine.  A test called the HbA1C (A1C) will be monitored. This is a simple blood test. It measures your blood sugar control over the last 2 to 3 months. You will receive this test every 3 to 6 months.  Learn how to check your blood sugar. Learn the symptoms of low and high blood sugar and how to manage them.  Always carry a quick-source of sugar with you in case you have symptoms of low blood sugar. Examples include hard sugar candy or glucose tablets. Make sure others know that you can choke if you eat or drink when you develop serious symptoms of low blood sugar, such as seizures or unconsciousness. They must get medical help at once.  Tell your doctor or health care professional if you have high blood sugar. You might need to change the dose of your medicine. If you are sick or exercising more than usual, you might need to change the dose of your medicine.  Do not skip meals. Ask your doctor or health care professional if you should avoid alcohol. Many nonprescription cough and cold products contain sugar or alcohol. These can affect blood sugar.  Wear a medical ID bracelet or chain, and carry a card that describes your disease and details of your medicine and dosage times.  NOTE:This sheet is a summary. It may not cover all possible information. If you have questions about this medicine, talk to your doctor, pharmacist, or health care provider. Copyright© 2017 [employer]      Results for orders placed or performed in visit on 05/17/19   PSA, Screening   Result Value Ref Range    PSA, SCREEN 0.64 0.00 - 4.00 ng/mL   CBC auto differential   Result Value Ref Range    WBC 5.34 3.90 - 12.70 K/uL    RBC 4.67 4.60 - 6.20 M/uL     Hemoglobin 13.2 (L) 14.0 - 18.0 g/dL    Hematocrit 38.8 (L) 40.0 - 54.0 %    Mean Corpuscular Volume 83 82 - 98 fL    Mean Corpuscular Hemoglobin 28.3 27.0 - 31.0 pg    Mean Corpuscular Hemoglobin Conc 34.0 32.0 - 36.0 g/dL    RDW 12.1 11.5 - 14.5 %    Platelets 253 150 - 350 K/uL    MPV 10.6 9.2 - 12.9 fL    Gran # (ANC) 2.7 1.8 - 7.7 K/uL    Lymph # 1.9 1.0 - 4.8 K/uL    Mono # 0.5 0.3 - 1.0 K/uL    Eos # 0.2 0.0 - 0.5 K/uL    Baso # 0.03 0.00 - 0.20 K/uL    Gran% 51.2 38.0 - 73.0 %    Lymph% 35.8 18.0 - 48.0 %    Mono% 9.0 4.0 - 15.0 %    Eosinophil% 3.2 0.0 - 8.0 %    Basophil% 0.6 0.0 - 1.9 %    Differential Method Automated    Comprehensive metabolic panel   Result Value Ref Range    Sodium 136 136 - 145 mmol/L    Potassium 4.1 3.5 - 5.1 mmol/L    Chloride 101 95 - 110 mmol/L    CO2 27 23 - 29 mmol/L    Glucose 211 (H) 70 - 110 mg/dL    BUN, Bld 11 6 - 20 mg/dL    Creatinine 0.8 0.5 - 1.4 mg/dL    Calcium 9.2 8.7 - 10.5 mg/dL    Total Protein 7.6 6.0 - 8.4 g/dL    Albumin 4.1 3.5 - 5.2 g/dL    Total Bilirubin 0.2 0.1 - 1.0 mg/dL    Alkaline Phosphatase 67 55 - 135 U/L    AST 20 10 - 40 U/L    ALT 35 10 - 44 U/L    Anion Gap 8 8 - 16 mmol/L    eGFR if African American >60 >60 mL/min/1.73 m^2    eGFR if non African American >60 >60 mL/min/1.73 m^2   Lipid panel   Result Value Ref Range    Cholesterol 132 120 - 199 mg/dL    Triglycerides 104 30 - 150 mg/dL    HDL 37 (L) 40 - 75 mg/dL    LDL Cholesterol 74.2 63.0 - 159.0 mg/dL    Hdl/Cholesterol Ratio 28.0 20.0 - 50.0 %    Total Cholesterol/HDL Ratio 3.6 2.0 - 5.0    Non-HDL Cholesterol 95 mg/dL   Hemoglobin A1c   Result Value Ref Range    Hemoglobin A1C 10.1 (H) 4.0 - 5.6 %    Estimated Avg Glucose 243 (H) 68 - 131 mg/dL   TSH   Result Value Ref Range    TSH 1.270 0.400 - 4.000 uIU/mL   Vitamin D   Result Value Ref Range    Vit D, 25-Hydroxy 27 (L) 30 - 96 ng/mL   Vitamin B12   Result Value Ref Range    Vitamin B-12 317 210 - 950 pg/mL       Vitamin B 12 500  mcg daily

## 2019-05-21 NOTE — PROGRESS NOTES
Spoke with pt. He says he is up to date on eye exam. Record request faxed to Dr. Rubio/[facility]. Also discussed pt's recent A1c result & diabetes education. Pt feels he should probably go, order has been placed. Pt coming for appt w/ PCP today, says he will schedule diabetes education appt at check out today.

## 2019-06-11 DIAGNOSIS — E11.9 TYPE 2 DIABETES MELLITUS WITHOUT COMPLICATION: ICD-10-CM

## 2019-09-17 ENCOUNTER — PATIENT MESSAGE (OUTPATIENT)
Dept: INTERNAL MEDICINE | Facility: CLINIC | Age: 49
End: 2019-09-17

## 2019-09-17 DIAGNOSIS — R73.9 HYPERGLYCEMIA: ICD-10-CM

## 2019-09-17 DIAGNOSIS — E11.65 TYPE 2 DIABETES MELLITUS WITH HYPERGLYCEMIA, WITHOUT LONG-TERM CURRENT USE OF INSULIN: ICD-10-CM

## 2019-09-17 NOTE — TELEPHONE ENCOUNTER
From: Filiberto Cohn     Sent: 9/17/2019  8:27 AM CDT       To: Fadumo Rasheed MD  Subject: Prescription    Dr Rasheed, I have been taking Invokona 100mg now for 3 months, and I believe I've reached the end of my insurance's allowable retail fill window, so the price basically went up. They are advising me to have it filled through mail for a 90 day supply.     On the medication, my Numbers have steadily gone down to the mid to upper 100's. That's not where I'd like to be, but it's much better then before the meds. I have also lost about 15 pounds since I last saw you, and continue to lose while on the medication.     Are you able to [facility] a script to [facility] for a 90 day supply and should we stay at 100 mg or move to 200mg?     Let me know what I need to do on my end. I am due to come in for an A1C check the end of this month.     Rach Cohn      Please advise  Thank you

## 2019-09-20 ENCOUNTER — TELEPHONE (OUTPATIENT)
Dept: INTERNAL MEDICINE | Facility: CLINIC | Age: 49
End: 2019-09-20

## 2019-09-20 NOTE — TELEPHONE ENCOUNTER
Contact: [facility] / [phone]    Pharmacy is requesting a call from the doctor regarding the patient's medication canagliflozin (INVOKANA) 300 mg Tab tablet.  Patient's insurance will not covered the medication.     Please advise, thank you

## 2019-09-20 NOTE — TELEPHONE ENCOUNTER
Dear Keisha,  I sent the prescription to  Our [facility] pharmacy  If this SGLT2 is not covered maybe his insurance will cover a different agent in this same class

## 2019-10-08 ENCOUNTER — OFFICE VISIT (OUTPATIENT)
Dept: URGENT CARE | Facility: CLINIC | Age: 49
End: 2019-10-08
Payer: COMMERCIAL

## 2019-10-08 VITALS
DIASTOLIC BLOOD PRESSURE: 80 MMHG | SYSTOLIC BLOOD PRESSURE: 119 MMHG | BODY MASS INDEX: 24.12 KG/M2 | TEMPERATURE: 99 F | RESPIRATION RATE: 20 BRPM | WEIGHT: 194 LBS | OXYGEN SATURATION: 98 % | HEIGHT: 75 IN | HEART RATE: 78 BPM

## 2019-10-08 DIAGNOSIS — J06.9 UPPER RESPIRATORY TRACT INFECTION, UNSPECIFIED TYPE: Primary | ICD-10-CM

## 2019-10-08 LAB
CTP QC/QA: YES
FLUAV AG NPH QL: NEGATIVE
FLUBV AG NPH QL: NEGATIVE

## 2019-10-08 PROCEDURE — 87804 INFLUENZA ASSAY W/OPTIC: CPT | Mod: QW,S$GLB,, | Performed by: NURSE PRACTITIONER

## 2019-10-08 PROCEDURE — 87804 POCT INFLUENZA A/B: ICD-10-PCS | Mod: 59,QW,S$GLB, | Performed by: NURSE PRACTITIONER

## 2019-10-08 PROCEDURE — 3079F DIAST BP 80-89 MM HG: CPT | Mod: CPTII,S$GLB,, | Performed by: NURSE PRACTITIONER

## 2019-10-08 PROCEDURE — 99213 PR OFFICE/OUTPT VISIT, EST, LEVL III, 20-29 MIN: ICD-10-PCS | Mod: 25,S$GLB,, | Performed by: NURSE PRACTITIONER

## 2019-10-08 PROCEDURE — 99213 OFFICE O/P EST LOW 20 MIN: CPT | Mod: 25,S$GLB,, | Performed by: NURSE PRACTITIONER

## 2019-10-08 PROCEDURE — 3008F BODY MASS INDEX DOCD: CPT | Mod: CPTII,S$GLB,, | Performed by: NURSE PRACTITIONER

## 2019-10-08 PROCEDURE — 3074F PR MOST RECENT SYSTOLIC BLOOD PRESSURE < 130 MM HG: ICD-10-PCS | Mod: CPTII,S$GLB,, | Performed by: NURSE PRACTITIONER

## 2019-10-08 PROCEDURE — 3074F SYST BP LT 130 MM HG: CPT | Mod: CPTII,S$GLB,, | Performed by: NURSE PRACTITIONER

## 2019-10-08 PROCEDURE — 3079F PR MOST RECENT DIASTOLIC BLOOD PRESSURE 80-89 MM HG: ICD-10-PCS | Mod: CPTII,S$GLB,, | Performed by: NURSE PRACTITIONER

## 2019-10-08 PROCEDURE — 3008F PR BODY MASS INDEX (BMI) DOCUMENTED: ICD-10-PCS | Mod: CPTII,S$GLB,, | Performed by: NURSE PRACTITIONER

## 2019-10-08 RX ORDER — DIAZEPAM 5 MG/1
TABLET ORAL
Refills: 0 | COMMUNITY
Start: 2019-07-09 | End: 2021-12-20

## 2019-10-08 RX ORDER — AMOXICILLIN 500 MG/1
CAPSULE ORAL
Refills: 0 | COMMUNITY
Start: 2019-07-09 | End: 2020-03-07

## 2019-10-08 RX ORDER — IBUPROFEN 600 MG/1
TABLET ORAL
Refills: 0 | COMMUNITY
Start: 2019-07-09 | End: 2023-07-14

## 2019-10-08 NOTE — PROGRESS NOTES
"Subjective:       Patient ID: Filiberto Cohn is a 49 y.o. male.    Vitals:  height is 6' 3" (1.905 m) and weight is 88 kg (194 lb). His oral temperature is 98.5 °F (36.9 °C). His blood pressure is 119/80 and his pulse is 78. His respiration is 20 and oxygen saturation is 98%.     Chief Complaint: URI    This is a 49 y.o. male   who presents today with a chief complaint of cold symptoms he's had for the past nine days. He's complaining of congestion, cough and body aches. He's been taking mucinex and motrin to help relieve his symptoms.     URI    This is a new problem. The current episode started 1 to 4 weeks ago. The problem has been unchanged. There has been no fever. Associated symptoms include congestion and coughing. Pertinent negatives include no ear pain, nausea, rash, sinus pain, sore throat, vomiting or wheezing. Treatments tried: mucinex and motrin. The treatment provided mild relief.       Constitution: Negative for chills, sweating, fatigue and fever.   HENT: Positive for congestion. Negative for ear pain, sinus pain, sinus pressure, sore throat and voice change.    Neck: Negative for painful lymph nodes.   Eyes: Negative for eye redness.   Respiratory: Positive for cough and sputum production. Negative for chest tightness, bloody sputum, COPD, shortness of breath, stridor, wheezing and asthma.    Gastrointestinal: Negative for nausea and vomiting.   Musculoskeletal: Positive for muscle ache.   Skin: Negative for rash.   Allergic/Immunologic: Negative for seasonal allergies and asthma.   Hematologic/Lymphatic: Negative for swollen lymph nodes.       Objective:      Physical Exam   Constitutional: He is oriented to person, place, and time. He appears well-developed and well-nourished. He is cooperative.  Non-toxic appearance. He does not have a sickly appearance. He does not appear ill. No distress.   HENT:   Head: Normocephalic and atraumatic.   Right Ear: Hearing, external ear and ear canal normal. " Tympanic membrane is bulging. A middle ear effusion is present.   Left Ear: Hearing, external ear and ear canal normal. Tympanic membrane is bulging.   Nose: Mucosal edema and rhinorrhea present. No nasal deformity. No epistaxis. Right sinus exhibits no maxillary sinus tenderness and no frontal sinus tenderness. Left sinus exhibits no maxillary sinus tenderness and no frontal sinus tenderness.   Mouth/Throat: Uvula is midline and mucous membranes are normal. No trismus in the jaw. Normal dentition. No uvula swelling. Posterior oropharyngeal erythema present. No oropharyngeal exudate or posterior oropharyngeal edema.   Eyes: Conjunctivae and lids are normal. No scleral icterus.   Neck: Trachea normal, full passive range of motion without pain and phonation normal. Neck supple. No neck rigidity. No edema and no erythema present.   Cardiovascular: Normal rate, regular rhythm, normal heart sounds, intact distal pulses and normal pulses.   Pulmonary/Chest: Effort normal and breath sounds normal. No respiratory distress. He has no decreased breath sounds. He has no rhonchi.   Abdominal: Normal appearance.   Musculoskeletal: Normal range of motion. He exhibits no edema or deformity.   Lymphadenopathy:        Head (right side): No submental, no submandibular, no tonsillar, no preauricular and no posterior auricular adenopathy present.        Head (left side): No submental, no submandibular, no tonsillar, no preauricular and no posterior auricular adenopathy present.   Neurological: He is alert and oriented to person, place, and time. He exhibits normal muscle tone. Coordination normal.   Skin: Skin is warm, dry, intact, not diaphoretic and not pale.   Psychiatric: He has a normal mood and affect. His speech is normal and behavior is normal. Judgment and thought content normal. Cognition and memory are normal.   Nursing note and vitals reviewed.        Assessment:       1. Upper respiratory tract infection, unspecified type         Plan:         Upper respiratory tract infection, unspecified type  -     POCT Influenza A/B

## 2019-10-09 NOTE — PATIENT INSTRUCTIONS
Return to Urgent Care or go to ER if symptoms worsen or fail to improve.  Follow up with PCP as recommended for further management.       Viral Upper Respiratory Illness (Adult)  You have a viral upper respiratory illness (URI), which is another term for the common cold. This illness is contagious during the first few days. It is spread through the air by coughing and sneezing. It may also be spread by direct contact (touching the sick person and then touching your own eyes, nose, or mouth). Frequent handwashing will decrease risk of spread. Most viral illnesses go away within 7 to 10 days with rest and simple home remedies. Sometimes the illness may last for several weeks. Antibiotics will not kill a virus, and they are generally not prescribed for this condition.    Home care  · If symptoms are severe, rest at home for the first 2 to 3 days. When you resume activity, don't let yourself get too tired.  · Avoid being exposed to cigarette smoke (yours or others).  · You may use acetaminophen or ibuprofen to control pain and fever, unless another medicine was prescribed. (Note: If you have chronic liver or kidney disease, have ever had a stomach ulcer or gastrointestinal bleeding, or are taking blood-thinning medicines, talk with your healthcare provider before using these medicines.) Aspirin should never be given to anyone under 18 years of age who is ill with a viral infection or fever. It may cause severe liver or brain damage.  · Your appetite may be poor, so a light diet is fine. Avoid dehydration by drinking 6 to 8 glasses of fluids per day (water, soft drinks, juices, tea, or soup). Extra fluids will help loosen secretions in the nose and lungs.  · Over-the-counter cold medicines will not shorten the length of time youre sick, but they may be helpful for the following symptoms: cough, sore throat, and nasal and sinus congestion. (Note: Do not use decongestants if you have high blood pressure.)  Follow-up  care  Follow up with your healthcare provider, or as advised.  When to seek medical advice  Call your healthcare provider right away if any of these occur:  · Cough with lots of colored sputum (mucus)  · Severe headache; face, neck, or ear pain  · Difficulty swallowing due to throat pain  · Fever of 100.4°F (38°C)  Call 911, or get immediate medical care  Call emergency services right away if any of these occur:  · Chest pain, shortness of breath, wheezing, or difficulty breathing  · Coughing up blood  · Inability to swallow due to throat pain  Date Last Reviewed: 9/13/2015  © 7498-5324 [employer]. [address] [place] [id number]. All rights reserved. This information is not intended as a substitute for professional medical care. Always follow your healthcare professional's instructions.      Return to Urgent Care or go to ER if symptoms worsen or fail to improve.  Follow up with PCP as recommended for further management.       Use Flonase 1-2 sprays/nostril per day. It is a local acting steroid nasal spray, if you develop a bloody nose, stop using the medication immediately.    Use Afrin in each nare for no longer than 5 days, as it is addictive. It can also dry out your mucous membranes and cause elevated blood pressure.    Use pseudoephedrine (behind the counter) to decongest-- (the little red pills).  Pseudoephedrine 30 mg up to 240 mg /day. It can raise your blood pressure and give you palpitations.  Do not buy any oral medications with phenylephrine as it has not been proven effective as a decongestant at the OTC dose.     Take Ibuprofen or Acetaminophen according to label instructions for fever, throat pain, headache, and body aches    Use warm salt water gargles to ease your throat pain. Warm salt water gargles as needed for sore throat-  1/2 tsp salt to 1 cup warm water, gargle as desired.    Sometimes Nyquil at night is beneficial to help you get some rest, however it is sedating  and does have an antihistamine, as well as tylenol.  The Nyquil behind the pharmacy counter also has the decongestant, pseudoephedrine as an additional ingredient.

## 2019-11-02 ENCOUNTER — TELEPHONE (OUTPATIENT)
Dept: INTERNAL MEDICINE | Facility: CLINIC | Age: 49
End: 2019-11-02

## 2019-11-02 DIAGNOSIS — E11.65 TYPE 2 DIABETES MELLITUS WITH HYPERGLYCEMIA, WITHOUT LONG-TERM CURRENT USE OF INSULIN: Primary | ICD-10-CM

## 2019-11-02 RX ORDER — PRAVASTATIN SODIUM 20 MG/1
TABLET ORAL
Qty: 90 TABLET | Refills: 4 | Status: SHIPPED | OUTPATIENT
Start: 2019-11-02 | End: 2020-12-08 | Stop reason: SDUPTHER

## 2019-11-07 ENCOUNTER — TELEPHONE (OUTPATIENT)
Dept: INTERNAL MEDICINE | Facility: CLINIC | Age: 49
End: 2019-11-07

## 2019-11-07 DIAGNOSIS — E11.65 TYPE 2 DIABETES MELLITUS WITH HYPERGLYCEMIA, WITHOUT LONG-TERM CURRENT USE OF INSULIN: Primary | ICD-10-CM

## 2019-11-07 DIAGNOSIS — E53.8 VITAMIN B 12 DEFICIENCY: ICD-10-CM

## 2019-11-12 ENCOUNTER — LAB VISIT (OUTPATIENT)
Dept: LAB | Facility: HOSPITAL | Age: 49
End: 2019-11-12
Attending: INTERNAL MEDICINE
Payer: COMMERCIAL

## 2019-11-12 ENCOUNTER — PATIENT MESSAGE (OUTPATIENT)
Dept: INTERNAL MEDICINE | Facility: CLINIC | Age: 49
End: 2019-11-12

## 2019-11-12 DIAGNOSIS — E11.65 TYPE 2 DIABETES MELLITUS WITH HYPERGLYCEMIA, WITHOUT LONG-TERM CURRENT USE OF INSULIN: ICD-10-CM

## 2019-11-12 DIAGNOSIS — E53.8 VITAMIN B 12 DEFICIENCY: ICD-10-CM

## 2019-11-12 LAB
ALBUMIN SERPL BCP-MCNC: 4.2 G/DL (ref 3.5–5.2)
ALP SERPL-CCNC: 48 U/L (ref 55–135)
ALT SERPL W/O P-5'-P-CCNC: 19 U/L (ref 10–44)
ANION GAP SERPL CALC-SCNC: 8 MMOL/L (ref 8–16)
AST SERPL-CCNC: 15 U/L (ref 10–40)
BILIRUB SERPL-MCNC: 0.2 MG/DL (ref 0.1–1)
BUN SERPL-MCNC: 15 MG/DL (ref 6–20)
CALCIUM SERPL-MCNC: 9.1 MG/DL (ref 8.7–10.5)
CHLORIDE SERPL-SCNC: 105 MMOL/L (ref 95–110)
CO2 SERPL-SCNC: 26 MMOL/L (ref 23–29)
CREAT SERPL-MCNC: 0.8 MG/DL (ref 0.5–1.4)
EST. GFR  (AFRICAN AMERICAN): >60 ML/MIN/1.73 M^2
EST. GFR  (NON AFRICAN AMERICAN): >60 ML/MIN/1.73 M^2
ESTIMATED AVG GLUCOSE: 183 MG/DL (ref 68–131)
GLUCOSE SERPL-MCNC: 171 MG/DL (ref 70–110)
HBA1C MFR BLD HPLC: 8 % (ref 4–5.6)
POTASSIUM SERPL-SCNC: 4.1 MMOL/L (ref 3.5–5.1)
PROT SERPL-MCNC: 7.8 G/DL (ref 6–8.4)
SODIUM SERPL-SCNC: 139 MMOL/L (ref 136–145)
VIT B12 SERPL-MCNC: 442 PG/ML (ref 210–950)

## 2019-11-12 PROCEDURE — 80053 COMPREHEN METABOLIC PANEL: CPT

## 2019-11-12 PROCEDURE — 36415 COLL VENOUS BLD VENIPUNCTURE: CPT

## 2019-11-12 PROCEDURE — 82607 VITAMIN B-12: CPT

## 2019-11-12 PROCEDURE — 83036 HEMOGLOBIN GLYCOSYLATED A1C: CPT

## 2019-11-13 NOTE — TELEPHONE ENCOUNTER
Sent: 11/12/2019  8:55 PM CST       To: Fadumo Rasheed MD  Subject: Prescription    I have a question about HEMOGLOBIN A1C resulted on 11/12/19, 10:31 AM.    Dr Rasheed, yes, I'm still taking the Invonkona, however, I was only on 100 mg for a month, then I had insurance issues, so I was off a month, then on another month then last month we moved me to 300 mg. I will say my A1C went from a 10.1 to an 8 during that time and I've lost 18 pounds with 10 left to get me to 185.     If there is another course of action you'd like to discuss, please let me know. I wish my A1C was in the 6 range, and That's my goal, but I think I need more time on the meds.     On the 300 mg of Invokna, my readings most mornings have been 140-165, but again, I've only been on that dose for a month.     Rach Gtz    Please advise

## 2019-11-13 NOTE — TELEPHONE ENCOUNTER
From: Filiberto Cohn     Sent: 11/13/2019  1:36 PM CST       To: Fadumo Rasheed MD  Subject: RE: Prescription    Dr. Rasheed, I am on 300 mg Invokana and 2k metformin. I believe my A1C will continue to drop, as we invested in an indoor recumbent bike.     I will schedule the test for mid February.    Rach Gtz

## 2019-12-10 RX ORDER — LOSARTAN POTASSIUM 50 MG/1
TABLET ORAL
Qty: 90 TABLET | Refills: 1 | Status: SHIPPED | OUTPATIENT
Start: 2019-12-10 | End: 2020-05-19

## 2019-12-10 RX ORDER — METFORMIN HYDROCHLORIDE 500 MG/1
TABLET ORAL
Qty: 360 TABLET | Refills: 1 | Status: SHIPPED | OUTPATIENT
Start: 2019-12-10 | End: 2020-06-16

## 2019-12-10 NOTE — PROGRESS NOTES
Refill Authorization Note     is requesting a refill authorization.    Brief assessment and rationale for refill: APPROVE: prr          Medication Therapy Plan: A1C=8                              Comments:   Requested Prescriptions   Pending Prescriptions Disp Refills    metFORMIN (GLUCOPHAGE) 500 MG tablet [Pharmacy Med Name: METFORMIN HCL TABS 500MG] 360 tablet 1     Sig: TAKE 4 TABLETS WITH BREAKFAST       Endocrinology:  Diabetes - Biguanides Failed - 12/9/2019  7:51 AM        Failed - HBA1C is 7.9 or below and within 180 days     Hemoglobin A1C   Date Value Ref Range Status   11/12/2019 8.0 (H) 4.0 - 5.6 % Final     Comment:     ADA Screening Guidelines:  5.7-6.4%  Consistent with prediabetes  >or=6.5%  Consistent with diabetes  High levels of fetal hemoglobin interfere with the HbA1C  assay. Heterozygous hemoglobin variants (HbS, HgC, etc)do  not significantly interfere with this assay.   However, presence of multiple variants may affect accuracy.     05/17/2019 10.1 (H) 4.0 - 5.6 % Final     Comment:     ADA Screening Guidelines:  5.7-6.4%  Consistent with prediabetes  >or=6.5%  Consistent with diabetes  High levels of fetal hemoglobin interfere with the HbA1C  assay. Heterozygous hemoglobin variants (HbS, HgC, etc)do  not significantly interfere with this assay.   However, presence of multiple variants may affect accuracy.     09/29/2017 7.3 (H) 4.0 - 5.6 % Final     Comment:     According to ADA guidelines, hemoglobin A1c <7.0% represents  optimal control in non-pregnant diabetic patients. Different  metrics may apply to specific patient populations.   Standards of Medical Care in Diabetes-2016.  For the purpose of screening for the presence of diabetes:  <5.7%     Consistent with the absence of diabetes  5.7-6.4%  Consistent with increasing risk for diabetes   (prediabetes)  >or=6.5%  Consistent with diabetes  Currently, no consensus exists for use of hemoglobin A1c  for diagnosis of diabetes  for children.  This Hemoglobin A1c assay has significant interference with fetal   hemoglobin   (HbF). The results are invalid for patients with abnormal amounts of   HbF,   including those with known Hereditary Persistence   of Fetal Hemoglobin. Heterozygous hemoglobin variants (HbAS, HbAC,   HbAD, HbAE, HbA2) do not significantly interfere with this assay;   however, presence of multiple variants in a sample may impact the %   interference.                Passed - Patient is at least 18 years old        Passed - Office visit in past 12 months or future 90 days     Recent Outpatient Visits            2 months ago Upper respiratory tract infection, unspecified type    [facility] Ellie Soni NP    6 months ago Annual physical exam    [facility] Fadumo Rasheed MD    2 years ago Annual physical exam    [facility] Fadumo Rasheed MD    2 years ago Poison ivy dermatitis    [facility] - Internal Medicine Sandeep Hernandez MD    3 years ago Annual physical exam    [facility] Fadumo Rasheed MD                    Passed - Cr is 1.4 or below and within 360 days     Creatinine   Date Value Ref Range Status   11/12/2019 0.8 0.5 - 1.4 mg/dL Final   05/17/2019 0.8 0.5 - 1.4 mg/dL Final   09/29/2017 0.8 0.5 - 1.4 mg/dL Final              Passed - eGFR is 30 or above and within 360 days     eGFR if non    Date Value Ref Range Status   11/12/2019 >60 >60 mL/min/1.73 m^2 Final     Comment:     Calculation used to obtain the estimated glomerular filtration  rate (eGFR) is the CKD-EPI equation.      05/17/2019 >60 >60 mL/min/1.73 m^2 Final     Comment:     Calculation used to obtain the estimated glomerular filtration  rate (eGFR) is the CKD-EPI equation.      09/29/2017 >60.0 >60 mL/min/1.73 m^2 Final     Comment:     Calculation used to obtain the estimated glomerular filtration  rate (eGFR) is the CKD-EPI equation. Since  race is unknown   in our information system, the eGFR values for   -American and Non--American patients are given   for each creatinine result.               losartan (COZAAR) 50 MG tablet [Pharmacy Med Name: LOSARTAN TABS 50MG] 90 tablet 1     Sig: TAKE 1 TABLET ONCE DAILY (REPLACES RAMIPRIL 5MG WHICH IS DISCONTINUED)       Cardiovascular:  Angiotensin Receptor Blockers Passed - 12/9/2019  7:51 AM        Passed - Patient is at least 18 years old        Passed - Last BP in normal range within 360 days     BP Readings from Last 3 Encounters:   10/08/19 119/80   05/21/19 116/80   10/04/17 110/60              Passed - Office visit in past 12 months or future 90 days     Recent Outpatient Visits            2 months ago Upper respiratory tract infection, unspecified type    [facility] Ellie Soni NP    6 months ago Annual physical exam    [facility] - Internal Medicine Fadumo Rasheed MD    2 years ago Annual physical exam    [facility] Internal Medicine Fadumo Rahseed MD    2 years ago Poison ivy dermatitis    [facility] - Internal Medicine Sandeep Hernandez MD    3 years ago Annual physical exam    [facility] Medicine Fadumo Rasheed MD                    Passed - Cr is 1.4 or below and within 360 days     Creatinine   Date Value Ref Range Status   11/12/2019 0.8 0.5 - 1.4 mg/dL Final   05/17/2019 0.8 0.5 - 1.4 mg/dL Final   09/29/2017 0.8 0.5 - 1.4 mg/dL Final              Passed - K in normal range and within 360 days     Potassium   Date Value Ref Range Status   11/12/2019 4.1 3.5 - 5.1 mmol/L Final   05/17/2019 4.1 3.5 - 5.1 mmol/L Final   09/29/2017 4.2 3.5 - 5.1 mmol/L Final              Passed - eGFR within 360 days     eGFR if non    Date Value Ref Range Status   11/12/2019 >60 >60 mL/min/1.73 m^2 Final     Comment:     Calculation used to obtain the estimated glomerular filtration  rate (eGFR) is the CKD-EPI equation.      05/17/2019  >60 >60 mL/min/1.73 m^2 Final     Comment:     Calculation used to obtain the estimated glomerular filtration  rate (eGFR) is the CKD-EPI equation.      09/29/2017 >60.0 >60 mL/min/1.73 m^2 Final     Comment:     Calculation used to obtain the estimated glomerular filtration  rate (eGFR) is the CKD-EPI equation. Since race is unknown   in our information system, the eGFR values for   -American and Non--American patients are given   for each creatinine result.

## 2020-01-13 ENCOUNTER — OFFICE VISIT (OUTPATIENT)
Dept: URGENT CARE | Facility: CLINIC | Age: 50
End: 2020-01-13
Payer: COMMERCIAL

## 2020-01-13 VITALS
WEIGHT: 194 LBS | BODY MASS INDEX: 24.12 KG/M2 | TEMPERATURE: 97 F | SYSTOLIC BLOOD PRESSURE: 116 MMHG | DIASTOLIC BLOOD PRESSURE: 81 MMHG | OXYGEN SATURATION: 97 % | HEIGHT: 75 IN | HEART RATE: 103 BPM

## 2020-01-13 DIAGNOSIS — S61.213A LACERATION OF LEFT MIDDLE FINGER WITHOUT FOREIGN BODY WITHOUT DAMAGE TO NAIL, INITIAL ENCOUNTER: Primary | ICD-10-CM

## 2020-01-13 PROCEDURE — 99214 PR OFFICE/OUTPT VISIT, EST, LEVL IV, 30-39 MIN: ICD-10-PCS | Mod: 25,S$GLB,, | Performed by: FAMILY MEDICINE

## 2020-01-13 PROCEDURE — 12001 RPR S/N/AX/GEN/TRNK 2.5CM/<: CPT | Mod: S$GLB,,, | Performed by: FAMILY MEDICINE

## 2020-01-13 PROCEDURE — 12001 LACERATION REPAIR: ICD-10-PCS | Mod: S$GLB,,, | Performed by: FAMILY MEDICINE

## 2020-01-13 PROCEDURE — 99214 OFFICE O/P EST MOD 30 MIN: CPT | Mod: 25,S$GLB,, | Performed by: FAMILY MEDICINE

## 2020-01-13 NOTE — PATIENT INSTRUCTIONS
PLEASE READ YOUR DISCHARGE INSTRUCTIONS ENTIRELY AS IT CONTAINS IMPORTANT INFORMATION.    Please return here or go to the Emergency Department for any concerns or worsening of condition.    If you were prescribed antibiotics, please take them to completion.      If not allergic, please take over the counter Tylenol (Acetaminophen) and/or Motrin (Ibuprofen) as directed for control of pain and/or fever.  Please follow up with your primary care doctor or specialist as needed.  Soak wound as discussed and apply warm compresses frequently    If you smoke, please stop smoking.    Please return or see your primary care doctor if you develop new or worsening symptoms.     Please arrange follow up with your primary medical clinic as soon as possible. You must understand that you've received an Urgent Care treatment only and that you may be released before all of your medical problems are known or treated. You, the patient, will arrange for follow up as instructed. If your symptoms worsen or fail to improve you should go to the Emergency Room.  Extremity Laceration: Sutures, Staples, or Tape  A laceration is a cut through the skin. If it is deep, it may require stitches (sutures) or staples to close so it can heal. Minor cuts may be treated with surgical tape closures.   X-rays may be done if something may have entered the skin through the cut. You may also need a tetanus shot if you are not up to date on this vaccination.  Home care  · Follow the health care providers instructions on how to care for the cut.  · Wash your hands with soap and warm water before and after caring for your wound. This is to help prevent infection.  · Keep the wound clean and dry. If a bandage was applied and it becomes wet or dirty, replace it. Otherwise, leave it in place for the first 24 hours, then change it once a day or as directed.  · If sutures or staples were used, clean the wound daily:  · After removing the bandage, wash the area with  soap and water. Use a wet cotton swab to loosen and remove any blood or crust that forms.  · After cleaning, keep the wound clean and dry. Talk with your doctor before applying any antibiotic ointment to the wound. Reapply the bandage.  · You may remove the bandage to shower as usual after the first 24 hours, but do not soak the area in water (no swimming) until the stitches or staples are removed.  · If surgical tape closures were used, keep the area clean and dry. If it becomes wet, blot it dry with a towel.  · The doctor may prescribe an antibiotic cream or ointment to prevent infection. Do not stop taking this medication until you have finished the prescribed course or the doctor tells you to stop. The doctor may also prescribe medications for pain. Follow the doctors instructions for taking these medications.  · Avoid activities that may reopen your wound.  Follow-up care  Follow up with your health care provider. Most skin wounds heal within ten days. However, an infection may sometimes occur despite proper treatment. Therefore, check the wound daily for the signs of infection listed below. Stitches and staples should be removed within 7-14 days. If surgical tape closures were used, you may remove them after 10 days if they have not fallen off by then.   When to seek medical advice  Call your health care provider right away if any of these occur:  · Wound bleeding not controlled by direct pressure  · Signs of infection, including increasing pain in the wound, increasing wound redness or swelling, or pus or bad odor coming from the wound  · Fever of 100.4°F (38ºC) or higher or as directed by your healthcare provider  · Stitches or staples come apart or fall out or surgical tape falls off before 7 days  · Wound edges re-open  · Wound changes colors  · Numbness around the wound   · Decreased movement around the injured area  Date Last Reviewed: 6/14/2015  © 3682-7296 [employer]. [address] [place] [address]  Road, [place] [address]. All rights reserved. This information is not intended as a substitute for professional medical care. Always follow your healthcare professional's instructions.        Small or Superficial Laceration: Not Sutured  A laceration is a cut through the skin. A laceration requires stitches or staples if it is deep or spread open. A small laceration often doesn't require stitches.   You may need a tetanus shot. This may be given if you have no record of this vaccination and the object that caused the cut may lead to tetanus  Home care  · Your healthcare provider may prescribe an antibiotic. This is to help prevent infection. Follow all instructions for taking this medicine. Take the medicine every day until it is gone or you are told to stop. You should not have any left over.  · The healthcare provider may prescribe medicines for pain. Follow instructions for taking them.  · Follow the healthcare providers instructions on how to care for the cut.  · Wash your hands with soap and warm water before and after caring for cut. This helps prevent infection.  · Keep the wound clean and dry. If a bandage was applied and it becomes wet or dirty, replace it. Otherwise, leave it in place for the first 24 hours, then change it once a day or as directed.  · Clean the wound daily:  ¨ After removing any bandage, wash the area with soap and water. Use a wet cotton swab to loosen and remove any blood or crust that forms.  ¨ After cleaning, keep the wound clean and dry. Talk with your doctor before applying any antibiotic ointment to the wound. Reapply a fresh bandage.  · You may remove the bandage to shower as usual after the first 24 hours, but do not soak the area in water (no tub baths or swimming) for the next 5 days.  · If the area gets wet, gently pat it dry with a clean cloth. Replace the wet bandage with a dry one.  · Avoid activities that may reinjure your wound.  · Do not scratch, rub, or pick at the  area.  · Check the wound daily for signs of infection listed below.  Follow-up care  Follow up with your healthcare provider as advised.  When to seek medical advice  Call your healthcare provider right away if any of these occur:  · Wound bleeding not controlled by direct pressure  · Signs of infection, including increasing pain in the wound, increasing wound redness or swelling, or pus or bad odor coming from the wound  · Fever of 100.4°F (38ºC) or higher or as directed by your healthcare provider  · Stitches or staples come apart or fall out or surgical tape falls off before 7 days  · Wound edges re-open  · Wound changes colors  · Numbness around the wound   · Decreased movement around the injured area  Date Last Reviewed: 6/14/2015  © 1782-4636 [employer]. [address] [place] [address], [place] [address]. All rights reserved. This information is not intended as a substitute for professional medical care. Always follow your healthcare professional's instructions.        Laceration: Skin Adhesive  A laceration is a cut through the skin. You have a laceration that your healthcare provider has closed with skin adhesive, a type of skin glue.  Home care  You may take acetaminophen or ibuprofen for pain, unless your provider prescribed another pain medicine.  If you have chronic liver or kidney disease or ever had a stomach ulcer or gastrointestinal bleeding, talk with your healthcare provider before using these medicines.  General care  · Keep the wound clean and dry. You may shower or bathe as usual, but do not use soaps, lotions, or ointments on the wound area. Do not scrub the wound. After bathing, pat the wound dry with a soft towel.  · Do not scratch, rub, or pick at the adhesive film. Do not place tape directly over the film.  · Do not apply liquids (such as peroxide), ointments, or creams to the wound while the film is in place.  · Most skin wounds heal without problems. However, an infection sometimes  occurs despite proper treatment. Therefore, watch for the signs of infection listed below.  Follow-up care  Follow up with your healthcare provider, or as advised. The adhesive film will fall off in 5 to 7 days.  When to seek medical advice  Call your healthcare provider right away if any of these occur:  · Signs of infection:  ¨ Fever of 100.4ºF (38ºC) or higher, or as advised by your healthcare provider  ¨ Increasing pain in the wound  ¨ Increasing redness or swelling  ¨ Pus coming from the wound  · Wound bleeds more than a small amount or bleeding doesnt stop  · Glue comes off earlier than expected and the wound edges come apart  · You feel numbness or weakness in the wound area that doesnt go away  Date Last Reviewed: 6/1/2016 © 2000-2017 [employer]. [address] [place] [address], [place] [address]. All rights reserved. This information is not intended as a substitute for professional medical care. Always follow your healthcare professional's instructions.        Wound Check, No Infection  Your wound is healing as expected. There are no signs of infection.   Home care  Continue to care for your wound as directed.  · Cover your wound with a bandage unless your healthcare provider tells you not to.  · Gently clean your wound with soap and water when you shower.   · Unless told otherwise, avoid swimming and taking tub baths until your wound has healed.  Follow-up care  Follow up with your healthcare provider as advised.  · If you have sutures or staples, return as directed to have them removed. If they are not taken out on time, they may be harder to remove and scarring may be worse. Infection may develop.  · If surgical tape strips were used, you can remove them yourself if they have not fallen off by 10 days after they were applied.   When to seek medical advice  Call your healthcare provider right away if any of these occur:  · Fever of 100.4ºF (38ºC) or higher, or as directed by your health care  provider  · Symptoms of a wound infection, including:  ¨ Redness or swelling around the wound  ¨ Warmth coming from the wound  ¨ New or worsening pain  ¨ Red streaks around the wound  ¨ Draining pus  Date Last Reviewed: 8/24/2015 © 2000-2017 [employer]. [address] [place] [id number]. All rights reserved. This information is not intended as a substitute for professional medical care. Always follow your healthcare professional's instructions.

## 2020-01-13 NOTE — PROCEDURES
"Laceration Repair  Date/Time: 1/13/2020 10:30 AM  Performed by: Tucker Bermudez MD  Authorized by: Tucker Bermudez MD   Consent Done: Not Needed  Time out: Immediately prior to procedure a "time out" was called to verify the correct patient, procedure, equipment, support staff and site/side marked as required.  Body area: upper extremity  Location details: left long finger  Laceration length: 1 cm  Foreign bodies: no foreign bodies  Tendon involvement: none  Nerve involvement: none  Vascular damage: no    Anesthesia:  Local anesthesia used: no  Irrigation solution: saline  Debridement: none  Approximation: close  Dressing: Steri-Strips  Patient tolerance: Patient tolerated the procedure well with no immediate complications        "

## 2020-01-13 NOTE — PROGRESS NOTES
"Subjective:       Patient ID: Filiberto Cohn is a 49 y.o. male.    Vitals:  height is 6' 3" (1.905 m) and weight is 88 kg (194 lb). His temperature is 96.7 °F (35.9 °C). His blood pressure is 116/81 and his pulse is 103. His oxygen saturation is 97%.     Chief Complaint: Hand Pain    48 yo male presents today with chief complaint of left finger pain. Pt states that he was putting a drain on sink when his the tip of his middle finger nicked a sharp clip. He reports that his finger would not stop bleeding for 15 minutes.  Denies any problem moving on bending the finger.    Hand Pain    The incident occurred 1 to 3 hours ago. The incident occurred at home. The pain is present in the left fingers. The quality of the pain is described as aching. The pain does not radiate. The pain is at a severity of 3/10. The pain is mild. The pain has been constant since the incident. Associated symptoms include tingling. Pertinent negatives include no chest pain. The symptoms are aggravated by movement. He has tried nothing for the symptoms.       Constitution: Negative for chills, fatigue and fever.   HENT: Negative for facial swelling, facial trauma, congestion and sore throat.    Neck: Negative for neck stiffness and painful lymph nodes.   Cardiovascular: Negative for chest trauma, chest pain and leg swelling.   Eyes: Negative for eye trauma, double vision and blurred vision.   Respiratory: Negative for cough and shortness of breath.    Gastrointestinal: Negative for abdominal trauma, abdominal pain, nausea, vomiting, diarrhea and rectal bleeding.   Genitourinary: Negative for dysuria, frequency, urgency, hematuria, genital trauma and pelvic pain.   Musculoskeletal: Positive for pain and trauma. Negative for joint pain, joint swelling, abnormal ROM of joint, pain with walking, muscle cramps and muscle ache.   Skin: Negative for color change, pale, rash, wound, abrasion and laceration.   Allergic/Immunologic: Negative for seasonal " allergies.   Neurological: Negative for dizziness, history of vertigo, light-headedness, passing out, coordination disturbances, headaches, altered mental status and loss of consciousness.   Hematologic/Lymphatic: Negative for swollen lymph nodes, easy bruising/bleeding, history of blood clots and history of bleeding disorder. Does not bruise/bleed easily.   Psychiatric/Behavioral: Negative for altered mental status, nervous/anxious, sleep disturbance and depression. The patient is not nervous/anxious.        Objective:      Physical Exam   Constitutional: He is oriented to person, place, and time. He appears well-developed and well-nourished. He is cooperative.  Non-toxic appearance. He does not appear ill. No distress.   HENT:   Head: Normocephalic and atraumatic. Head is without abrasion, without contusion and without laceration.   Right Ear: Hearing, tympanic membrane, external ear and ear canal normal. No hemotympanum.   Left Ear: Hearing, tympanic membrane, external ear and ear canal normal. No hemotympanum.   Nose: Nose normal. No mucosal edema, rhinorrhea or nasal deformity. No epistaxis. Right sinus exhibits no maxillary sinus tenderness and no frontal sinus tenderness. Left sinus exhibits no maxillary sinus tenderness and no frontal sinus tenderness.   Mouth/Throat: Uvula is midline, oropharynx is clear and moist and mucous membranes are normal. No trismus in the jaw. Normal dentition. No uvula swelling. No posterior oropharyngeal erythema.   Eyes: Pupils are equal, round, and reactive to light. Conjunctivae, EOM and lids are normal. Right eye exhibits no discharge. Left eye exhibits no discharge. No scleral icterus.   Neck: Trachea normal, normal range of motion, full passive range of motion without pain and phonation normal. Neck supple. No spinous process tenderness and no muscular tenderness present. No neck rigidity. No tracheal deviation present.   Cardiovascular: Normal rate, regular rhythm, normal  heart sounds, intact distal pulses and normal pulses.   Pulmonary/Chest: Effort normal and breath sounds normal. No respiratory distress.   Abdominal: Soft. Normal appearance and bowel sounds are normal. He exhibits no distension, no pulsatile midline mass and no mass. There is no tenderness.   Musculoskeletal: Normal range of motion. He exhibits no edema or deformity.   Neurological: He is alert and oriented to person, place, and time. He has normal strength. No cranial nerve deficit or sensory deficit. He exhibits normal muscle tone. He displays no seizure activity. Coordination normal. GCS eye subscore is 4. GCS verbal subscore is 5. GCS motor subscore is 6.   Skin: Skin is warm, dry, intact, not diaphoretic and not pale. Capillary refill takes less than 2 seconds.   Left middle finger:  Positive about 1 cm linear superficial laceration with scant bleeding.  Bleeding stopped after applying pressure for few minutes.  Wound clean and closed by Steri-Strips placed after putting tincture Benzene.  Neurovascular intact.  FROM intact.  abrasion, burn, bruising and ecchymosis  Psychiatric: He has a normal mood and affect. His speech is normal and behavior is normal. Judgment and thought content normal. Cognition and memory are normal.   Nursing note and vitals reviewed.        Assessment:       1. Laceration of left middle finger without foreign body without damage to nail, initial encounter        Plan:         Laceration of left middle finger without foreign body without damage to nail, initial encounter  -     Laceration Repair        PLEASE READ YOUR DISCHARGE INSTRUCTIONS ENTIRELY AS IT CONTAINS IMPORTANT INFORMATION.    Please return here or go to the Emergency Department for any concerns or worsening of condition.    If you were prescribed antibiotics, please take them to completion.      If not allergic, please take over the counter Tylenol (Acetaminophen) and/or Motrin (Ibuprofen) as directed for control of  pain and/or fever.  Please follow up with your primary care doctor or specialist as needed.  Soak wound as discussed and apply warm compresses frequently    If you smoke, please stop smoking.    Please return or see your primary care doctor if you develop new or worsening symptoms.     Please arrange follow up with your primary medical clinic as soon as possible. You must understand that you've received an Urgent Care treatment only and that you may be released before all of your medical problems are known or treated. You, the patient, will arrange for follow up as instructed. If your symptoms worsen or fail to improve you should go to the Emergency Room.  Extremity Laceration: Sutures, Staples, or Tape  A laceration is a cut through the skin. If it is deep, it may require stitches (sutures) or staples to close so it can heal. Minor cuts may be treated with surgical tape closures.   X-rays may be done if something may have entered the skin through the cut. You may also need a tetanus shot if you are not up to date on this vaccination.  Home care  · Follow the health care providers instructions on how to care for the cut.  · Wash your hands with soap and warm water before and after caring for your wound. This is to help prevent infection.  · Keep the wound clean and dry. If a bandage was applied and it becomes wet or dirty, replace it. Otherwise, leave it in place for the first 24 hours, then change it once a day or as directed.  · If sutures or staples were used, clean the wound daily:  · After removing the bandage, wash the area with soap and water. Use a wet cotton swab to loosen and remove any blood or crust that forms.  · After cleaning, keep the wound clean and dry. Talk with your doctor before applying any antibiotic ointment to the wound. Reapply the bandage.  · You may remove the bandage to shower as usual after the first 24 hours, but do not soak the area in water (no swimming) until the stitches or  staples are removed.  · If surgical tape closures were used, keep the area clean and dry. If it becomes wet, blot it dry with a towel.  · The doctor may prescribe an antibiotic cream or ointment to prevent infection. Do not stop taking this medication until you have finished the prescribed course or the doctor tells you to stop. The doctor may also prescribe medications for pain. Follow the doctors instructions for taking these medications.  · Avoid activities that may reopen your wound.  Follow-up care  Follow up with your health care provider. Most skin wounds heal within ten days. However, an infection may sometimes occur despite proper treatment. Therefore, check the wound daily for the signs of infection listed below. Stitches and staples should be removed within 7-14 days. If surgical tape closures were used, you may remove them after 10 days if they have not fallen off by then.   When to seek medical advice  Call your health care provider right away if any of these occur:  · Wound bleeding not controlled by direct pressure  · Signs of infection, including increasing pain in the wound, increasing wound redness or swelling, or pus or bad odor coming from the wound  · Fever of 100.4°F (38ºC) or higher or as directed by your healthcare provider  · Stitches or staples come apart or fall out or surgical tape falls off before 7 days  · Wound edges re-open  · Wound changes colors  · Numbness around the wound   · Decreased movement around the injured area  Date Last Reviewed: 6/14/2015  © 0868-2937 [employer]. [address] [place] [address]. All rights reserved. This information is not intended as a substitute for professional medical care. Always follow your healthcare professional's instructions.        Small or Superficial Laceration: Not Sutured  A laceration is a cut through the skin. A laceration requires stitches or staples if it is deep or spread open. A small laceration often  doesn't require stitches.   You may need a tetanus shot. This may be given if you have no record of this vaccination and the object that caused the cut may lead to tetanus  Home care  · Your healthcare provider may prescribe an antibiotic. This is to help prevent infection. Follow all instructions for taking this medicine. Take the medicine every day until it is gone or you are told to stop. You should not have any left over.  · The healthcare provider may prescribe medicines for pain. Follow instructions for taking them.  · Follow the healthcare providers instructions on how to care for the cut.  · Wash your hands with soap and warm water before and after caring for cut. This helps prevent infection.  · Keep the wound clean and dry. If a bandage was applied and it becomes wet or dirty, replace it. Otherwise, leave it in place for the first 24 hours, then change it once a day or as directed.  · Clean the wound daily:  ¨ After removing any bandage, wash the area with soap and water. Use a wet cotton swab to loosen and remove any blood or crust that forms.  ¨ After cleaning, keep the wound clean and dry. Talk with your doctor before applying any antibiotic ointment to the wound. Reapply a fresh bandage.  · You may remove the bandage to shower as usual after the first 24 hours, but do not soak the area in water (no tub baths or swimming) for the next 5 days.  · If the area gets wet, gently pat it dry with a clean cloth. Replace the wet bandage with a dry one.  · Avoid activities that may reinjure your wound.  · Do not scratch, rub, or pick at the area.  · Check the wound daily for signs of infection listed below.  Follow-up care  Follow up with your healthcare provider as advised.  When to seek medical advice  Call your healthcare provider right away if any of these occur:  · Wound bleeding not controlled by direct pressure  · Signs of infection, including increasing pain in the wound, increasing wound redness or  swelling, or pus or bad odor coming from the wound  · Fever of 100.4°F (38ºC) or higher or as directed by your healthcare provider  · Stitches or staples come apart or fall out or surgical tape falls off before 7 days  · Wound edges re-open  · Wound changes colors  · Numbness around the wound   · Decreased movement around the injured area  Date Last Reviewed: 6/14/2015  © 2671-0468 [employer]. [address] [place] [address], [place] [address]. All rights reserved. This information is not intended as a substitute for professional medical care. Always follow your healthcare professional's instructions.        Laceration: Skin Adhesive  A laceration is a cut through the skin. You have a laceration that your healthcare provider has closed with skin adhesive, a type of skin glue.  Home care  You may take acetaminophen or ibuprofen for pain, unless your provider prescribed another pain medicine.  If you have chronic liver or kidney disease or ever had a stomach ulcer or gastrointestinal bleeding, talk with your healthcare provider before using these medicines.  General care  · Keep the wound clean and dry. You may shower or bathe as usual, but do not use soaps, lotions, or ointments on the wound area. Do not scrub the wound. After bathing, pat the wound dry with a soft towel.  · Do not scratch, rub, or pick at the adhesive film. Do not place tape directly over the film.  · Do not apply liquids (such as peroxide), ointments, or creams to the wound while the film is in place.  · Most skin wounds heal without problems. However, an infection sometimes occurs despite proper treatment. Therefore, watch for the signs of infection listed below.  Follow-up care  Follow up with your healthcare provider, or as advised. The adhesive film will fall off in 5 to 7 days.  When to seek medical advice  Call your healthcare provider right away if any of these occur:  · Signs of infection:  ¨ Fever of 100.4ºF (38ºC) or higher, or as  advised by your healthcare provider  ¨ Increasing pain in the wound  ¨ Increasing redness or swelling  ¨ Pus coming from the wound  · Wound bleeds more than a small amount or bleeding doesnt stop  · Glue comes off earlier than expected and the wound edges come apart  · You feel numbness or weakness in the wound area that doesnt go away  Date Last Reviewed: 6/1/2016 © 2000-2017 [email or web address] [address] [place] [address]. All rights reserved. This information is not intended as a substitute for professional medical care. Always follow your healthcare professional's instructions.        Wound Check, No Infection  Your wound is healing as expected. There are no signs of infection.   Home care  Continue to care for your wound as directed.  · Cover your wound with a bandage unless your healthcare provider tells you not to.  · Gently clean your wound with soap and water when you shower.   · Unless told otherwise, avoid swimming and taking tub baths until your wound has healed.  Follow-up care  Follow up with your healthcare provider as advised.  · If you have sutures or staples, return as directed to have them removed. If they are not taken out on time, they may be harder to remove and scarring may be worse. Infection may develop.  · If surgical tape strips were used, you can remove them yourself if they have not fallen off by 10 days after they were applied.   When to seek medical advice  Call your healthcare provider right away if any of these occur:  · Fever of 100.4ºF (38ºC) or higher, or as directed by your health care provider  · Symptoms of a wound infection, including:  ¨ Redness or swelling around the wound  ¨ Warmth coming from the wound  ¨ New or worsening pain  ¨ Red streaks around the wound  ¨ Draining pus  Date Last Reviewed: 8/24/2015  © 9727-3971 [email or web address] [address] [place] [address]. All rights reserved. This information is not intended as a  substitute for professional medical care. Always follow your healthcare professional's instructions.    Follow up with PCP within next 2-5 days.  If symptoms get worse, go to ER for further evaluation.

## 2020-03-07 ENCOUNTER — OFFICE VISIT (OUTPATIENT)
Dept: INTERNAL MEDICINE | Facility: CLINIC | Age: 50
End: 2020-03-07
Payer: COMMERCIAL

## 2020-03-07 VITALS
HEIGHT: 75 IN | WEIGHT: 190.06 LBS | SYSTOLIC BLOOD PRESSURE: 98 MMHG | HEART RATE: 83 BPM | DIASTOLIC BLOOD PRESSURE: 60 MMHG | BODY MASS INDEX: 23.63 KG/M2 | OXYGEN SATURATION: 99 %

## 2020-03-07 DIAGNOSIS — E11.65 TYPE 2 DIABETES MELLITUS WITH HYPERGLYCEMIA, WITHOUT LONG-TERM CURRENT USE OF INSULIN: ICD-10-CM

## 2020-03-07 DIAGNOSIS — L03.011 PARONYCHIA OF FINGER OF RIGHT HAND: Primary | ICD-10-CM

## 2020-03-07 DIAGNOSIS — E78.2 MIXED HYPERLIPIDEMIA: ICD-10-CM

## 2020-03-07 DIAGNOSIS — L08.9 FINGER INFECTION: ICD-10-CM

## 2020-03-07 DIAGNOSIS — I10 ESSENTIAL HYPERTENSION: ICD-10-CM

## 2020-03-07 PROCEDURE — 3074F PR MOST RECENT SYSTOLIC BLOOD PRESSURE < 130 MM HG: ICD-10-PCS | Mod: CPTII,S$GLB,, | Performed by: INTERNAL MEDICINE

## 2020-03-07 PROCEDURE — 99214 PR OFFICE/OUTPT VISIT, EST, LEVL IV, 30-39 MIN: ICD-10-PCS | Mod: S$GLB,,, | Performed by: INTERNAL MEDICINE

## 2020-03-07 PROCEDURE — 3078F DIAST BP <80 MM HG: CPT | Mod: CPTII,S$GLB,, | Performed by: INTERNAL MEDICINE

## 2020-03-07 PROCEDURE — 99999 PR PBB SHADOW E&M-EST. PATIENT-LVL IV: ICD-10-PCS | Mod: PBBFAC,,, | Performed by: INTERNAL MEDICINE

## 2020-03-07 PROCEDURE — 3052F PR MOST RECENT HEMOGLOBIN A1C LEVEL 8.0 - < 9.0%: ICD-10-PCS | Mod: CPTII,S$GLB,, | Performed by: INTERNAL MEDICINE

## 2020-03-07 PROCEDURE — 3052F HG A1C>EQUAL 8.0%<EQUAL 9.0%: CPT | Mod: CPTII,S$GLB,, | Performed by: INTERNAL MEDICINE

## 2020-03-07 PROCEDURE — 99214 OFFICE O/P EST MOD 30 MIN: CPT | Mod: S$GLB,,, | Performed by: INTERNAL MEDICINE

## 2020-03-07 PROCEDURE — 99999 PR PBB SHADOW E&M-EST. PATIENT-LVL IV: CPT | Mod: PBBFAC,,, | Performed by: INTERNAL MEDICINE

## 2020-03-07 PROCEDURE — 3078F PR MOST RECENT DIASTOLIC BLOOD PRESSURE < 80 MM HG: ICD-10-PCS | Mod: CPTII,S$GLB,, | Performed by: INTERNAL MEDICINE

## 2020-03-07 PROCEDURE — 3008F BODY MASS INDEX DOCD: CPT | Mod: CPTII,S$GLB,, | Performed by: INTERNAL MEDICINE

## 2020-03-07 PROCEDURE — 3074F SYST BP LT 130 MM HG: CPT | Mod: CPTII,S$GLB,, | Performed by: INTERNAL MEDICINE

## 2020-03-07 PROCEDURE — 3008F PR BODY MASS INDEX (BMI) DOCUMENTED: ICD-10-PCS | Mod: CPTII,S$GLB,, | Performed by: INTERNAL MEDICINE

## 2020-03-07 RX ORDER — CEPHALEXIN 500 MG/1
500 CAPSULE ORAL EVERY 6 HOURS
Qty: 28 CAPSULE | Refills: 0 | Status: SHIPPED | OUTPATIENT
Start: 2020-03-07 | End: 2020-03-14

## 2020-03-07 NOTE — PROGRESS NOTES
Subjective:       Chief Complaint  Chief Complaint   Patient presents with    Hand Pain     right pointer finger       HPI  Filiberto Cohn is a 49 y.o. male with multiple medical diagnoses as listed in the medical history and problem list that presents for hand pain.       Pain and swelling in right index finger for past 5 days  Tried soaking in antibiotic solution  Pain and redness is worsening  Does bite his fingernails and plays the guitar      Patient Care Team:  Fadumo Rasheed MD as PCP - General (Internal Medicine)  Hannah Singh LPN as Care Coordinator (Internal Medicine)      PAST MEDICAL HISTORY:  Past Medical History:   Diagnosis Date    Diabetes mellitus type II     Hypertension        PAST SURGICAL HISTORY:  No past surgical history on file.    SOCIAL HISTORY:  Social History     Socioeconomic History    Marital status:      Spouse name: Not on file    Number of children: Not on file    Years of education: Not on file    Highest education level: Not on file   Occupational History    Not on file   Social Needs    Financial resource strain: Not on file    Food insecurity:     Worry: Not on file     Inability: Not on file    Transportation needs:     Medical: Not on file     Non-medical: Not on file   Tobacco Use    Smoking status: Never Smoker    Smokeless tobacco: Never Used   Substance and Sexual Activity    Alcohol use: Yes     Comment: occasional    Drug use: Not on file    Sexual activity: Not on file   Lifestyle    Physical activity:     Days per week: Not on file     Minutes per session: Not on file    Stress: Not on file   Relationships    Social connections:     Talks on phone: Not on file     Gets together: Not on file     Attends [employer]ism service: Not on file     Active member of club or organization: Not on file     Attends meetings of clubs or organizations: Not on file     Relationship status: Not on file   Other Topics Concern    Not on file   Social  History Narrative    July 2016    He is an [employer]     , his wife is an artist and also works at [employer] as a     Both of their children, son and daughter are thriving    They moved to [place] in order for him to accept this [employer] in 2009 from [place].    He is happy professionally and personally.  His children are happy in school at [employer].    They enjoy the community of their [employer], there school and the large community.       FAMILY HISTORY:  Family History   Problem Relation Age of Onset    Hypertension Mother     Cataracts Mother     Diabetes Mother     No Known Problems Father     Diabetes Sister     Hypertension Sister     No Known Problems Brother     No Known Problems Maternal Aunt     No Known Problems Maternal Uncle     No Known Problems Paternal Aunt     No Known Problems Paternal Uncle     Diabetes Maternal Grandmother     Cancer Maternal Grandmother     Diabetes Maternal Grandfather     Stroke Maternal Grandfather     Diabetes Paternal Grandmother     Diabetes Paternal Grandfather     Amblyopia Neg Hx     Blindness Neg Hx     Glaucoma Neg Hx     Macular degeneration Neg Hx     Retinal detachment Neg Hx     Strabismus Neg Hx     Thyroid disease Neg Hx        ALLERGIES AND MEDICATIONS: updated and reviewed.  Review of patient's allergies indicates:  No Known Allergies  Current Outpatient Medications   Medication Sig Dispense Refill    canagliflozin (INVOKANA) 300 mg Tab tablet Take 1 tablet (300 mg total) by mouth once daily. 90 tablet 3    canagliflozin (INVOKANA) 300 mg Tab tablet Take 1 tablet (300 mg total) by mouth every morning. 90 tablet 3    dextroamphetamine-amphetamine (ADDERALL XR) 15 MG 24 hr capsule Take 15 mg by mouth every morning.  0    diazePAM (VALIUM) 5 MG tablet TK 1 T PO NIGHT B SURGERY AND 1 T ONE HOUR B SURGERY  0    ibuprofen (ADVIL,MOTRIN) 600 MG tablet   0     "loratadine (CLARITIN) 10 mg tablet Take 10 mg by mouth once daily.        losartan (COZAAR) 50 MG tablet TAKE 1 TABLET ONCE DAILY (REPLACES RAMIPRIL 5MG WHICH IS DISCONTINUED) 90 tablet 1    metFORMIN (GLUCOPHAGE) 500 MG tablet TAKE 4 TABLETS WITH BREAKFAST 360 tablet 1    pravastatin (PRAVACHOL) 20 MG tablet TAKE 1 TABLET EVERY MORNING 90 tablet 4    cephALEXin (KEFLEX) 500 MG capsule Take 1 capsule (500 mg total) by mouth every 6 (six) hours. for 7 days 28 capsule 0    hyoscyamine (ANASPAZ,LEVSIN) 0.125 mg Tab Take 1 tablet (125 mcg total) by mouth every 4 (four) hours as needed. 120 tablet 0     No current facility-administered medications for this visit.          ROS  Review of Systems   Constitutional: Negative for chills, diaphoresis and fever.   Skin:        Rash of finger         Objective:       Physical Exam  Vitals:    03/07/20 1520   BP: 98/60   BP Location: Right arm   Patient Position: Sitting   BP Method: Large (Manual)   Pulse: 83   SpO2: 99%   Weight: 86.2 kg (190 lb 0.6 oz)   Height: 6' 3" (1.905 m)    Body mass index is 23.75 kg/m².  Weight: 86.2 kg (190 lb 0.6 oz)   Height: 6' 3" (190.5 cm)   Physical Exam   Constitutional: He appears well-developed and well-nourished.   HENT:   Head: Normocephalic and atraumatic.   Eyes: Conjunctivae and EOM are normal.   Neck: Normal range of motion. Neck supple.   Cardiovascular: Normal rate.   Pulmonary/Chest: Effort normal.   Musculoskeletal: He exhibits no edema.   Lymphadenopathy:     He has no cervical adenopathy.   Neurological: He is alert. No cranial nerve deficit.   Skin: Skin is warm and dry. No rash noted.   Swelling and erythema of right distal tip of index finger without other significant nailbed changes   Vitals reviewed.          Assessment:     1. Paronychia of finger of right hand    2. Finger infection    3. Mixed hyperlipidemia    4. Type 2 diabetes mellitus with hyperglycemia, without long-term current use of insulin    5. Essential " hypertension      Plan:     Filiberto was seen today for hand pain.    Diagnoses and all orders for this visit:    Paronychia of finger of right hand  Finger infection  Discussed etiology of skin/soft tissue infection. Keflex, soak finger as noted   -     cephALEXin (KEFLEX) 500 MG capsule; Take 1 capsule (500 mg total) by mouth every 6 (six) hours. for 7 days    Type 2 DM  Dyslipidemia  Reviewed - last A1c 8.0%   On Invokana, pravastatin    HTN  BP controlled presently - reviewed anti-hypertensive regimen - continue current therapy    Follow up if symptoms worsen or fail to improve.    The patient expressed understanding and no barriers to adherence were identified.     1. The patient indicates understanding of these issues and agrees with the plan. Brief care plan is updated and reviewed with the patient as applicable.     2. The patient is given an After Visit Summary that lists all medications with directions, allergies, orders placed during this encounter and follow-up instructions.     3. I have reviewed the patient's medical information including past medical, family, and social history sections including the medications and allergies.     4. We discussed the patient's current medications. I reconciled the patient's medication list and prepared and supplied needed refills.       Ronak Best MD  Internal Medicine-Pediatrics

## 2020-03-07 NOTE — PATIENT INSTRUCTIONS
Paronychia of the Finger or Toe    Paronychia is an infection near a fingernail or toenail. It usually occurs when an opening in the cuticle or an ingrown toenail lets bacteria under the skin.  The infection will need to be drained if pus is present. If the infection has been caught early, you may need only antibiotic treatment. Healing will take about 1 to 2 weeks.    Home care  Follow these guidelines when caring for yourself at home:  · Clean and soak the toe or finger. Do this 2 times a day for the first 3 days. To do so:  ¨ Soak your foot or hand in a tub of warm water for 5 minutes. Or hold your toe or finger under a faucet of warm running water for 5 minutes.  ¨ Clean any crust away with soap and water using a cotton swab.  ¨ Put antibiotic ointment on the infected area.  · Change the dressing daily or any time it gets dirty.  · If you were given antibiotics, take them as directed until they are all gone.  · If your infection is on a toe, wear comfortable shoes with a lot of toe room. You can also wear open-toed sandals while your toe heals.  · You may use over-the-counter medicine (acetaminophen or ibuprofen to help with pain, unless another medicine was prescribed. If you have chronic liver or kidney disease, talk with your healthcare provider before using these medicines. Also talk with your provider if you've had a stomach ulcer or GI (gastrointestinal) bleeding.  ·   Prevention  The following can prevent paronychia:  · Avoid cutting or playing with your cuticles at home.  · Don't bite your nails.  · Don't suck on your thumbs or fingers.    Follow-up care  Follow up with your healthcare provider, or as advised.    When to seek medical advice  Call your healthcare provider right away if any of these occur:  · Redness, pain, or swelling of the finger or toe gets worse  · Red streaks in the skin leading away from the wound  · Pus or fluid draining from the nail area  · Fever of 100.4ºF (38ºC) or higher, or  as directed by your provider  Date Last Reviewed: 8/1/2016  © 0144-2473 [employer] [address] [place] [address], [place] [address]. All rights reserved. This information is not intended as a substitute for professional medical care. Always follow your healthcare professional's instructions.

## 2020-05-19 RX ORDER — LOSARTAN POTASSIUM 50 MG/1
TABLET ORAL
Qty: 90 TABLET | Refills: 0 | Status: SHIPPED | OUTPATIENT
Start: 2020-05-19 | End: 2020-08-26

## 2020-05-19 NOTE — TELEPHONE ENCOUNTER
Please schedule patient for Annual    Please also check with your provider if any further labs need to be added and scheduled together.    Thanks!  [facility]     Note composed: 05/19/2020 3:28 PM

## 2020-05-19 NOTE — PROGRESS NOTES
Refill Authorization Note    is requesting a refill authorization.    Brief assessment and rationale for refill: APPROVE: need appt      Medication-related problems identified: Requires appointment    Medication Therapy Plan: need appt(ANNUAL); approve 3 more     Medication reconciliation completed: No                         Comments:      Requested Prescriptions   Pending Prescriptions Disp Refills    losartan (COZAAR) 50 MG tablet [Pharmacy Med Name: LOSARTAN TABS 50MG] 90 tablet 0     Sig: TAKE 1 TABLET ONCE DAILY (REPLACES RAMIPRIL 5MG WHICH IS DISCONTINUED)       Cardiovascular:  Angiotensin Receptor Blockers Passed - 5/19/2020  3:23 PM        Passed - Patient is at least 18 years old        Passed - Last BP in normal range within 360 days.     BP Readings from Last 3 Encounters:   03/07/20 98/60   01/13/20 116/81   10/08/19 119/80              Passed - Office visit in past 12 months or future 90 days.     Recent Outpatient Visits            2 months ago Paronychia of finger of right hand    [facility] Ronak Best MD    4 months ago Laceration of left middle finger without foreign body without damage to nail, initial encounter    [facility] Tucker Bermudez MD    7 months ago Upper respiratory tract infection, unspecified type    [facility] Ellie Soni NP    12 months ago Annual physical exam    [facility] Fadumo Rasheed MD    2 years ago Annual physical exam    [facility] Fadumo Rasheed MD                    Passed - Cr is 1.3 or below and within 360 days     Creatinine   Date Value Ref Range Status   11/12/2019 0.8 0.5 - 1.4 mg/dL Final   05/17/2019 0.8 0.5 - 1.4 mg/dL Final   09/29/2017 0.8 0.5 - 1.4 mg/dL Final              Passed - K in normal range and within 360 days     Potassium   Date Value Ref Range Status   11/12/2019 4.1 3.5 - 5.1 mmol/L Final   05/17/2019 4.1 3.5 -  5.1 mmol/L Final   09/29/2017 4.2 3.5 - 5.1 mmol/L Final              Passed - eGFR within 360 days     eGFR if non    Date Value Ref Range Status   11/12/2019 >60 >60 mL/min/1.73 m^2 Final     Comment:     Calculation used to obtain the estimated glomerular filtration  rate (eGFR) is the CKD-EPI equation.      05/17/2019 >60 >60 mL/min/1.73 m^2 Final     Comment:     Calculation used to obtain the estimated glomerular filtration  rate (eGFR) is the CKD-EPI equation.      09/29/2017 >60.0 >60 mL/min/1.73 m^2 Final     Comment:     Calculation used to obtain the estimated glomerular filtration  rate (eGFR) is the CKD-EPI equation. Since race is unknown   in our information system, the eGFR values for   -American and Non--American patients are given   for each creatinine result.       eGFR if    Date Value Ref Range Status   11/12/2019 >60 >60 mL/min/1.73 m^2 Final   05/17/2019 >60 >60 mL/min/1.73 m^2 Final   09/29/2017 >60.0 >60 mL/min/1.73 m^2 Final               Appointments  past 12m or future 3m with PCP    Date Provider   Last Visit   5/21/2019 Fadumo Rasheed MD   Next Visit   Visit date not found Fadumo Rasheed MD   ED visits in past 90 days: 0     Note composed:3:27 PM 05/19/2020

## 2020-05-19 NOTE — TELEPHONE ENCOUNTER
Called and spoke to patient. Patient is scheduled for an annual in Sept, next available. Will send out an appointment letter in the mail as a reminder. Patient was also informed that medication for BP was sent into pharmacy.

## 2020-07-02 ENCOUNTER — PATIENT OUTREACH (OUTPATIENT)
Dept: ADMINISTRATIVE | Facility: HOSPITAL | Age: 50
End: 2020-07-02

## 2020-07-02 NOTE — PROGRESS NOTES
Health Maintenance Due   Topic Date Due    HIV Screening  07/29/1985    TETANUS VACCINE  07/29/1988    Foot Exam  10/04/2018    Eye Exam  01/02/2019    Hemoglobin A1c  02/12/2020    Lipid Panel  05/17/2020     Portal message has been sent to patient regarding overdue health maintenance.  Chart review completed.

## 2020-08-18 ENCOUNTER — PATIENT OUTREACH (OUTPATIENT)
Dept: ADMINISTRATIVE | Facility: HOSPITAL | Age: 50
End: 2020-08-18

## 2020-08-18 NOTE — PROGRESS NOTES
Health Maintenance Due   Topic Date Due    Hepatitis C Screening  1970    HIV Screening  07/29/1985    TETANUS VACCINE  07/29/1988    Foot Exam  10/04/2018    Eye Exam  01/02/2019    Hemoglobin A1c  02/12/2020    Lipid Panel  05/17/2020    Shingles Vaccine (1 of 2) 07/29/2020    Colorectal Cancer Screening  07/29/2020     Portal message has been sent to patient regarding overdue health maintenance.  Chart review completed.

## 2020-08-21 DIAGNOSIS — Z12.11 COLON CANCER SCREENING: ICD-10-CM

## 2020-08-28 ENCOUNTER — LAB VISIT (OUTPATIENT)
Dept: LAB | Facility: HOSPITAL | Age: 50
End: 2020-08-28
Attending: INTERNAL MEDICINE
Payer: COMMERCIAL

## 2020-08-28 DIAGNOSIS — E78.2 MIXED HYPERLIPIDEMIA: ICD-10-CM

## 2020-08-28 DIAGNOSIS — Z12.5 SCREENING FOR PROSTATE CANCER: ICD-10-CM

## 2020-08-28 DIAGNOSIS — E11.65 TYPE 2 DIABETES MELLITUS WITH HYPERGLYCEMIA, WITHOUT LONG-TERM CURRENT USE OF INSULIN: ICD-10-CM

## 2020-08-28 DIAGNOSIS — E55.9 VITAMIN D DEFICIENCY: ICD-10-CM

## 2020-08-28 DIAGNOSIS — Z00.00 ANNUAL PHYSICAL EXAM: ICD-10-CM

## 2020-08-28 LAB
25(OH)D3+25(OH)D2 SERPL-MCNC: 36 NG/ML (ref 30–96)
ALBUMIN SERPL BCP-MCNC: 4.3 G/DL (ref 3.5–5.2)
ALP SERPL-CCNC: 49 U/L (ref 55–135)
ALT SERPL W/O P-5'-P-CCNC: 19 U/L (ref 10–44)
ANION GAP SERPL CALC-SCNC: 8 MMOL/L (ref 8–16)
AST SERPL-CCNC: 18 U/L (ref 10–40)
BASOPHILS # BLD AUTO: 0.04 K/UL (ref 0–0.2)
BASOPHILS NFR BLD: 0.7 % (ref 0–1.9)
BILIRUB SERPL-MCNC: 0.2 MG/DL (ref 0.1–1)
BUN SERPL-MCNC: 13 MG/DL (ref 6–20)
CALCIUM SERPL-MCNC: 9.3 MG/DL (ref 8.7–10.5)
CHLORIDE SERPL-SCNC: 102 MMOL/L (ref 95–110)
CHOLEST SERPL-MCNC: 142 MG/DL (ref 120–199)
CHOLEST SERPL-MCNC: 142 MG/DL (ref 120–199)
CHOLEST/HDLC SERPL: 3.2 {RATIO} (ref 2–5)
CHOLEST/HDLC SERPL: 3.2 {RATIO} (ref 2–5)
CO2 SERPL-SCNC: 28 MMOL/L (ref 23–29)
COMPLEXED PSA SERPL-MCNC: 0.68 NG/ML (ref 0–4)
CREAT SERPL-MCNC: 0.8 MG/DL (ref 0.5–1.4)
DIFFERENTIAL METHOD: ABNORMAL
EOSINOPHIL # BLD AUTO: 0.2 K/UL (ref 0–0.5)
EOSINOPHIL NFR BLD: 3.1 % (ref 0–8)
ERYTHROCYTE [DISTWIDTH] IN BLOOD BY AUTOMATED COUNT: 12.3 % (ref 11.5–14.5)
EST. GFR  (AFRICAN AMERICAN): >60 ML/MIN/1.73 M^2
EST. GFR  (NON AFRICAN AMERICAN): >60 ML/MIN/1.73 M^2
ESTIMATED AVG GLUCOSE: 192 MG/DL (ref 68–131)
ESTIMATED AVG GLUCOSE: 192 MG/DL (ref 68–131)
GLUCOSE SERPL-MCNC: 134 MG/DL (ref 70–110)
HBA1C MFR BLD HPLC: 8.3 % (ref 4–5.6)
HBA1C MFR BLD HPLC: 8.3 % (ref 4–5.6)
HCT VFR BLD AUTO: 41.8 % (ref 40–54)
HDLC SERPL-MCNC: 44 MG/DL (ref 40–75)
HDLC SERPL-MCNC: 44 MG/DL (ref 40–75)
HDLC SERPL: 31 % (ref 20–50)
HDLC SERPL: 31 % (ref 20–50)
HGB BLD-MCNC: 13.7 G/DL (ref 14–18)
IMM GRANULOCYTES # BLD AUTO: 0.01 K/UL (ref 0–0.04)
IMM GRANULOCYTES NFR BLD AUTO: 0.2 % (ref 0–0.5)
LDLC SERPL CALC-MCNC: 80.2 MG/DL (ref 63–159)
LDLC SERPL CALC-MCNC: 80.2 MG/DL (ref 63–159)
LYMPHOCYTES # BLD AUTO: 1.9 K/UL (ref 1–4.8)
LYMPHOCYTES NFR BLD: 33.2 % (ref 18–48)
MCH RBC QN AUTO: 27.6 PG (ref 27–31)
MCHC RBC AUTO-ENTMCNC: 32.8 G/DL (ref 32–36)
MCV RBC AUTO: 84 FL (ref 82–98)
MONOCYTES # BLD AUTO: 0.6 K/UL (ref 0.3–1)
MONOCYTES NFR BLD: 9.6 % (ref 4–15)
NEUTROPHILS # BLD AUTO: 3.1 K/UL (ref 1.8–7.7)
NEUTROPHILS NFR BLD: 53.2 % (ref 38–73)
NONHDLC SERPL-MCNC: 98 MG/DL
NONHDLC SERPL-MCNC: 98 MG/DL
NRBC BLD-RTO: 0 /100 WBC
PLATELET # BLD AUTO: 297 K/UL (ref 150–350)
PMV BLD AUTO: 10.8 FL (ref 9.2–12.9)
POTASSIUM SERPL-SCNC: 4.8 MMOL/L (ref 3.5–5.1)
PROT SERPL-MCNC: 8 G/DL (ref 6–8.4)
RBC # BLD AUTO: 4.97 M/UL (ref 4.6–6.2)
SODIUM SERPL-SCNC: 138 MMOL/L (ref 136–145)
TRIGL SERPL-MCNC: 89 MG/DL (ref 30–150)
TRIGL SERPL-MCNC: 89 MG/DL (ref 30–150)
WBC # BLD AUTO: 5.84 K/UL (ref 3.9–12.7)

## 2020-08-28 PROCEDURE — 80053 COMPREHEN METABOLIC PANEL: CPT

## 2020-08-28 PROCEDURE — 36415 COLL VENOUS BLD VENIPUNCTURE: CPT

## 2020-08-28 PROCEDURE — 84153 ASSAY OF PSA TOTAL: CPT

## 2020-08-28 PROCEDURE — 83036 HEMOGLOBIN GLYCOSYLATED A1C: CPT

## 2020-08-28 PROCEDURE — 85025 COMPLETE CBC W/AUTO DIFF WBC: CPT

## 2020-08-28 PROCEDURE — 82306 VITAMIN D 25 HYDROXY: CPT

## 2020-08-28 PROCEDURE — 80061 LIPID PANEL: CPT

## 2020-09-13 DIAGNOSIS — E11.65 TYPE 2 DIABETES MELLITUS WITH HYPERGLYCEMIA, WITHOUT LONG-TERM CURRENT USE OF INSULIN: Primary | ICD-10-CM

## 2020-09-13 NOTE — TELEPHONE ENCOUNTER
No new care gaps identified.  Powered by [employer]. Reference number: [id number]. 9/13/2020 10:28:59 AM   [email or web address]

## 2020-09-15 RX ORDER — METFORMIN HYDROCHLORIDE 500 MG/1
TABLET ORAL
Qty: 360 TABLET | Refills: 0 | Status: SHIPPED | OUTPATIENT
Start: 2020-09-15 | End: 2020-11-23

## 2020-09-15 NOTE — PROGRESS NOTES
Refill Authorization Note    is requesting a refill authorization.    Brief assessment and rationale for refill: APPROVE: prr          Medication Therapy Plan: CDMR: FLOS/FOVS: A1C > 8; approve 3 more    Medication reconciliation completed: No                    Comments:      Orders Placed This Encounter    metFORMIN (GLUCOPHAGE) 500 MG tablet      Requested Prescriptions   Signed Prescriptions Disp Refills    metFORMIN (GLUCOPHAGE) 500 MG tablet 360 tablet 0     Sig: TAKE 4 TABLETS BY MOUTH ONCE DAILY WITH BREAKFAST       Endocrinology:  Diabetes - Biguanides Failed - 9/13/2020 10:28 AM        Failed - HBA1C is 7.9 or below and within 180 days     Hemoglobin A1C   Date Value Ref Range Status   08/28/2020 8.3 (H) 4.0 - 5.6 % Final     Comment:     ADA Screening Guidelines:  5.7-6.4%  Consistent with prediabetes  >or=6.5%  Consistent with diabetes  High levels of fetal hemoglobin interfere with the HbA1C  assay. Heterozygous hemoglobin variants (HbS, HgC, etc)do  not significantly interfere with this assay.   However, presence of multiple variants may affect accuracy.     08/28/2020 8.3 (H) 4.0 - 5.6 % Final     Comment:     ADA Screening Guidelines:  5.7-6.4%  Consistent with prediabetes  >or=6.5%  Consistent with diabetes  High levels of fetal hemoglobin interfere with the HbA1C  assay. Heterozygous hemoglobin variants (HbS, HgC, etc)do  not significantly interfere with this assay.   However, presence of multiple variants may affect accuracy.     11/12/2019 8.0 (H) 4.0 - 5.6 % Final     Comment:     ADA Screening Guidelines:  5.7-6.4%  Consistent with prediabetes  >or=6.5%  Consistent with diabetes  High levels of fetal hemoglobin interfere with the HbA1C  assay. Heterozygous hemoglobin variants (HbS, HgC, etc)do  not significantly interfere with this assay.   However, presence of multiple variants may affect accuracy.                Passed - Patient is at least 18 years old        Passed - Office visit  in past 12 months or future 90 days.     Recent Outpatient Visits            6 months ago Paronychia of finger of right hand    [facility] Ronak Best MD    8 months ago Laceration of left middle finger without foreign body without damage to nail, initial encounter    [facility] Tucker Bermudez MD    11 months ago Upper respiratory tract infection, unspecified type    [facility] Ellie Soni, PAULA    1 year ago Annual physical exam    [facility] Fadumo Rasheed MD    2 years ago Annual physical exam    [facility] Fadumo Rasheed MD          Future Appointments              In 2 months LAB, SAME DAY [facility] - [facility], [facility] PC[facility]    In 2 months Fadumo Rasheed MD [facility], [facility]                Passed - Cr is 1.3 or below and within 360 days     Creatinine   Date Value Ref Range Status   08/28/2020 0.8 0.5 - 1.4 mg/dL Final   11/12/2019 0.8 0.5 - 1.4 mg/dL Final   05/17/2019 0.8 0.5 - 1.4 mg/dL Final              Passed - eGFR is 30 or above and within 360 days     eGFR if non    Date Value Ref Range Status   08/28/2020 >60.0 >60 mL/min/1.73 m^2 Final     Comment:     Calculation used to obtain the estimated glomerular filtration  rate (eGFR) is the CKD-EPI equation.      11/12/2019 >60 >60 mL/min/1.73 m^2 Final     Comment:     Calculation used to obtain the estimated glomerular filtration  rate (eGFR) is the CKD-EPI equation.      05/17/2019 >60 >60 mL/min/1.73 m^2 Final     Comment:     Calculation used to obtain the estimated glomerular filtration  rate (eGFR) is the CKD-EPI equation.        eGFR if    Date Value Ref Range Status   08/28/2020 >60.0 >60 mL/min/1.73 m^2 Final   11/12/2019 >60 >60 mL/min/1.73 m^2 Final   05/17/2019 >60 >60 mL/min/1.73 m^2 Final                  Appointments   past 12m or future 3m with PCP    Date Provider   Last Visit   5/21/2019 Fadumo Rasheed MD   Next Visit   12/8/2020 Fadumo Rasheed MD   ED visits in past 90 days: 0     Note composed:3:21 PM 09/15/2020

## 2020-11-23 DIAGNOSIS — E11.65 TYPE 2 DIABETES MELLITUS WITH HYPERGLYCEMIA, WITHOUT LONG-TERM CURRENT USE OF INSULIN: ICD-10-CM

## 2020-11-23 RX ORDER — LOSARTAN POTASSIUM 50 MG/1
TABLET ORAL
Qty: 90 TABLET | Refills: 0 | Status: SHIPPED | OUTPATIENT
Start: 2020-11-23 | End: 2020-12-08 | Stop reason: ALTCHOICE

## 2020-11-23 RX ORDER — METFORMIN HYDROCHLORIDE 500 MG/1
TABLET ORAL
Qty: 360 TABLET | Refills: 0 | Status: SHIPPED | OUTPATIENT
Start: 2020-11-23 | End: 2020-12-08 | Stop reason: SDUPTHER

## 2020-11-23 NOTE — TELEPHONE ENCOUNTER
No new care gaps identified.  Powered by [employer]. Reference number: [id number]. 11/23/2020 5:51:41 AM CST

## 2020-11-23 NOTE — PROGRESS NOTES
Refill Authorization Note   Filiberto Cohn is requesting a refill authorization.  Brief assessment and rationale for refill: Approve     Medication Therapy Plan: [facility]    Medication reconciliation completed: No   Comments:   Orders Placed This Encounter    metFORMIN (GLUCOPHAGE) 500 MG tablet    losartan (COZAAR) 50 MG tablet      Requested Prescriptions   Signed Prescriptions Disp Refills    metFORMIN (GLUCOPHAGE) 500 MG tablet 360 tablet 0     Sig: TAKE 4 TABLETS ONCE DAILY WITH BREAKFAST       Endocrinology:  Diabetes - Biguanides Failed - 11/23/2020  5:51 AM        Failed - HBA1C is 8 or below and within 180 days     Hemoglobin A1C   Date Value Ref Range Status   08/28/2020 8.3 (H) 4.0 - 5.6 % Final     Comment:     ADA Screening Guidelines:  5.7-6.4%  Consistent with prediabetes  >or=6.5%  Consistent with diabetes  High levels of fetal hemoglobin interfere with the HbA1C  assay. Heterozygous hemoglobin variants (HbS, HgC, etc)do  not significantly interfere with this assay.   However, presence of multiple variants may affect accuracy.     08/28/2020 8.3 (H) 4.0 - 5.6 % Final     Comment:     ADA Screening Guidelines:  5.7-6.4%  Consistent with prediabetes  >or=6.5%  Consistent with diabetes  High levels of fetal hemoglobin interfere with the HbA1C  assay. Heterozygous hemoglobin variants (HbS, HgC, etc)do  not significantly interfere with this assay.   However, presence of multiple variants may affect accuracy.     11/12/2019 8.0 (H) 4.0 - 5.6 % Final     Comment:     ADA Screening Guidelines:  5.7-6.4%  Consistent with prediabetes  >or=6.5%  Consistent with diabetes  High levels of fetal hemoglobin interfere with the HbA1C  assay. Heterozygous hemoglobin variants (HbS, HgC, etc)do  not significantly interfere with this assay.   However, presence of multiple variants may affect accuracy.                Passed - Patient is at least 18 years old        Passed - Office visit in past 12 months or future 90 days      Recent Outpatient Visits            8 months ago Paronychia of finger of right hand    [facility] Ronak Best MD    10 months ago Laceration of left middle finger without foreign body without damage to nail, initial encounter    [facility] Tucker Bermudez MD    1 year ago Upper respiratory tract infection, unspecified type    [facility] Ellie Soni, PAULA    1 year ago Annual physical exam    [facility] Fadumo Rasheed MD    3 years ago Annual physical exam    [facility] Fadumo Rasheed MD          Future Appointments              In 1 week LAB, SAME DAY [facility] - [facility], [facility] PC[facility]    In 2 weeks Fadumo Rasheed MD [facility] [place] [facility], [facility] PC                Passed - Cr is 1.4 or below and within 360 days     Creatinine   Date Value Ref Range Status   08/28/2020 0.8 0.5 - 1.4 mg/dL Final   11/12/2019 0.8 0.5 - 1.4 mg/dL Final   05/17/2019 0.8 0.5 - 1.4 mg/dL Final              Passed - eGFR is 30 or above and within 360 days     eGFR if non    Date Value Ref Range Status   08/28/2020 >60.0 >60 mL/min/1.73 m^2 Final     Comment:     Calculation used to obtain the estimated glomerular filtration  rate (eGFR) is the CKD-EPI equation.      11/12/2019 >60 >60 mL/min/1.73 m^2 Final     Comment:     Calculation used to obtain the estimated glomerular filtration  rate (eGFR) is the CKD-EPI equation.      05/17/2019 >60 >60 mL/min/1.73 m^2 Final     Comment:     Calculation used to obtain the estimated glomerular filtration  rate (eGFR) is the CKD-EPI equation.        eGFR if    Date Value Ref Range Status   08/28/2020 >60.0 >60 mL/min/1.73 m^2 Final   11/12/2019 >60 >60 mL/min/1.73 m^2 Final   05/17/2019 >60 >60 mL/min/1.73 m^2 Final                losartan (COZAAR) 50 MG tablet 90 tablet 0     Sig: TAKE 1  TABLET DAILY       Cardiovascular:  Angiotensin Receptor Blockers Passed - 11/23/2020  5:51 AM        Passed - Patient is at least 18 years old        Passed - Last BP in normal range within 360 days.     BP Readings from Last 3 Encounters:   03/07/20 98/60   01/13/20 116/81   10/08/19 119/80              Passed - Office visit in past 12 months or future 90 days     Recent Outpatient Visits            8 months ago Paronychia of finger of right hand    [facility] Ronak Best MD    10 months ago Laceration of left middle finger without foreign body without damage to nail, initial encounter    [facility] Tucker Bermudez MD    1 year ago Upper respiratory tract infection, unspecified type    [facility] Ellie Soni NP    1 year ago Annual physical exam    [facility] Fadumo Rasheed MD    3 years ago Annual physical exam    [facility] Fadumo Rasheed MD          Future Appointments              [facility]    In 2 weeks Fadumo Rasheed MD [facility], [facility] PC[facility]                Passed - Cr is 1.4 or below and within 360 days     Creatinine   Date Value Ref Range Status   08/28/2020 0.8 0.5 - 1.4 mg/dL Final   11/12/2019 0.8 0.5 - 1.4 mg/dL Final   05/17/2019 0.8 0.5 - 1.4 mg/dL Final              Passed - K in normal range and within 360 days     Potassium   Date Value Ref Range Status   08/28/2020 4.8 3.5 - 5.1 mmol/L Final   11/12/2019 4.1 3.5 - 5.1 mmol/L Final   05/17/2019 4.1 3.5 - 5.1 mmol/L Final              Passed - eGFR within 360 days     eGFR if non    Date Value Ref Range Status   08/28/2020 >60.0 >60 mL/min/1.73 m^2 Final     Comment:     Calculation used to obtain the estimated glomerular filtration  rate (eGFR) is the CKD-EPI equation.      11/12/2019 >60 >60  mL/min/1.73 m^2 Final     Comment:     Calculation used to obtain the estimated glomerular filtration  rate (eGFR) is the CKD-EPI equation.      05/17/2019 >60 >60 mL/min/1.73 m^2 Final     Comment:     Calculation used to obtain the estimated glomerular filtration  rate (eGFR) is the CKD-EPI equation.        eGFR if    Date Value Ref Range Status   08/28/2020 >60.0 >60 mL/min/1.73 m^2 Final   11/12/2019 >60 >60 mL/min/1.73 m^2 Final   05/17/2019 >60 >60 mL/min/1.73 m^2 Final                  Appointments  past 12m or future 3m with PCP    Date Provider   Last Visit   5/21/2019 Fadumo Rasheed MD   Next Visit   12/8/2020 Fadumo Rasheed MD   ED visits in past 90 days: 0     Note composed:2:28 PM 11/23/2020

## 2020-12-04 ENCOUNTER — LAB VISIT (OUTPATIENT)
Dept: LAB | Facility: HOSPITAL | Age: 50
End: 2020-12-04
Attending: INTERNAL MEDICINE
Payer: COMMERCIAL

## 2020-12-04 DIAGNOSIS — E11.65 TYPE 2 DIABETES MELLITUS WITH HYPERGLYCEMIA, WITHOUT LONG-TERM CURRENT USE OF INSULIN: ICD-10-CM

## 2020-12-04 LAB
ALBUMIN SERPL BCP-MCNC: 4.2 G/DL (ref 3.5–5.2)
ALP SERPL-CCNC: 50 U/L (ref 55–135)
ALT SERPL W/O P-5'-P-CCNC: 19 U/L (ref 10–44)
ANION GAP SERPL CALC-SCNC: 7 MMOL/L (ref 8–16)
AST SERPL-CCNC: 17 U/L (ref 10–40)
BILIRUB SERPL-MCNC: 0.2 MG/DL (ref 0.1–1)
BUN SERPL-MCNC: 20 MG/DL (ref 6–20)
CALCIUM SERPL-MCNC: 9.4 MG/DL (ref 8.7–10.5)
CHLORIDE SERPL-SCNC: 101 MMOL/L (ref 95–110)
CO2 SERPL-SCNC: 28 MMOL/L (ref 23–29)
CREAT SERPL-MCNC: 0.8 MG/DL (ref 0.5–1.4)
EST. GFR  (AFRICAN AMERICAN): >60 ML/MIN/1.73 M^2
EST. GFR  (NON AFRICAN AMERICAN): >60 ML/MIN/1.73 M^2
ESTIMATED AVG GLUCOSE: 177 MG/DL (ref 68–131)
GLUCOSE SERPL-MCNC: 131 MG/DL (ref 70–110)
HBA1C MFR BLD HPLC: 7.8 % (ref 4–5.6)
POTASSIUM SERPL-SCNC: 4.3 MMOL/L (ref 3.5–5.1)
PROT SERPL-MCNC: 7.8 G/DL (ref 6–8.4)
SODIUM SERPL-SCNC: 136 MMOL/L (ref 136–145)

## 2020-12-04 PROCEDURE — 80053 COMPREHEN METABOLIC PANEL: CPT

## 2020-12-04 PROCEDURE — 36415 COLL VENOUS BLD VENIPUNCTURE: CPT

## 2020-12-04 PROCEDURE — 83036 HEMOGLOBIN GLYCOSYLATED A1C: CPT

## 2020-12-08 ENCOUNTER — OFFICE VISIT (OUTPATIENT)
Dept: INTERNAL MEDICINE | Facility: CLINIC | Age: 50
End: 2020-12-08
Payer: COMMERCIAL

## 2020-12-08 VITALS
HEIGHT: 75 IN | DIASTOLIC BLOOD PRESSURE: 70 MMHG | BODY MASS INDEX: 23.77 KG/M2 | HEART RATE: 85 BPM | WEIGHT: 191.13 LBS | SYSTOLIC BLOOD PRESSURE: 110 MMHG

## 2020-12-08 DIAGNOSIS — I10 ESSENTIAL HYPERTENSION: ICD-10-CM

## 2020-12-08 DIAGNOSIS — Z12.11 SCREEN FOR COLON CANCER: ICD-10-CM

## 2020-12-08 DIAGNOSIS — Z00.00 ANNUAL PHYSICAL EXAM: Primary | ICD-10-CM

## 2020-12-08 DIAGNOSIS — Z12.5 SCREENING FOR PROSTATE CANCER: ICD-10-CM

## 2020-12-08 DIAGNOSIS — E78.2 MIXED HYPERLIPIDEMIA: ICD-10-CM

## 2020-12-08 DIAGNOSIS — R42 LIGHT HEADEDNESS: ICD-10-CM

## 2020-12-08 DIAGNOSIS — E11.65 TYPE 2 DIABETES MELLITUS WITH HYPERGLYCEMIA, WITHOUT LONG-TERM CURRENT USE OF INSULIN: ICD-10-CM

## 2020-12-08 PROCEDURE — 3008F BODY MASS INDEX DOCD: CPT | Mod: CPTII,S$GLB,, | Performed by: INTERNAL MEDICINE

## 2020-12-08 PROCEDURE — 3051F HG A1C>EQUAL 7.0%<8.0%: CPT | Mod: CPTII,S$GLB,, | Performed by: INTERNAL MEDICINE

## 2020-12-08 PROCEDURE — 99396 PR PREVENTIVE VISIT,EST,40-64: ICD-10-PCS | Mod: S$GLB,,, | Performed by: INTERNAL MEDICINE

## 2020-12-08 PROCEDURE — 3074F PR MOST RECENT SYSTOLIC BLOOD PRESSURE < 130 MM HG: ICD-10-PCS | Mod: CPTII,S$GLB,, | Performed by: INTERNAL MEDICINE

## 2020-12-08 PROCEDURE — 3008F PR BODY MASS INDEX (BMI) DOCUMENTED: ICD-10-PCS | Mod: CPTII,S$GLB,, | Performed by: INTERNAL MEDICINE

## 2020-12-08 PROCEDURE — 99396 PREV VISIT EST AGE 40-64: CPT | Mod: S$GLB,,, | Performed by: INTERNAL MEDICINE

## 2020-12-08 PROCEDURE — 99999 PR PBB SHADOW E&M-EST. PATIENT-LVL IV: ICD-10-PCS | Mod: PBBFAC,,, | Performed by: INTERNAL MEDICINE

## 2020-12-08 PROCEDURE — 1126F AMNT PAIN NOTED NONE PRSNT: CPT | Mod: S$GLB,,, | Performed by: INTERNAL MEDICINE

## 2020-12-08 PROCEDURE — 3078F DIAST BP <80 MM HG: CPT | Mod: CPTII,S$GLB,, | Performed by: INTERNAL MEDICINE

## 2020-12-08 PROCEDURE — 1126F PR PAIN SEVERITY QUANTIFIED, NO PAIN PRESENT: ICD-10-PCS | Mod: S$GLB,,, | Performed by: INTERNAL MEDICINE

## 2020-12-08 PROCEDURE — 99999 PR PBB SHADOW E&M-EST. PATIENT-LVL IV: CPT | Mod: PBBFAC,,, | Performed by: INTERNAL MEDICINE

## 2020-12-08 PROCEDURE — 3078F PR MOST RECENT DIASTOLIC BLOOD PRESSURE < 80 MM HG: ICD-10-PCS | Mod: CPTII,S$GLB,, | Performed by: INTERNAL MEDICINE

## 2020-12-08 PROCEDURE — 3051F PR MOST RECENT HEMOGLOBIN A1C LEVEL 7.0 - < 8.0%: ICD-10-PCS | Mod: CPTII,S$GLB,, | Performed by: INTERNAL MEDICINE

## 2020-12-08 PROCEDURE — 3074F SYST BP LT 130 MM HG: CPT | Mod: CPTII,S$GLB,, | Performed by: INTERNAL MEDICINE

## 2020-12-08 RX ORDER — LOSARTAN POTASSIUM 25 MG/1
25 TABLET ORAL DAILY
Qty: 90 TABLET | Refills: 3 | Status: SHIPPED | OUTPATIENT
Start: 2020-12-08 | End: 2022-06-03

## 2020-12-08 RX ORDER — PRAVASTATIN SODIUM 20 MG/1
20 TABLET ORAL EVERY MORNING
Qty: 90 TABLET | Refills: 4 | Status: SHIPPED | OUTPATIENT
Start: 2020-12-08 | End: 2022-01-26

## 2020-12-08 RX ORDER — METFORMIN HYDROCHLORIDE 500 MG/1
1000 TABLET ORAL 2 TIMES DAILY WITH MEALS
Qty: 360 TABLET | Refills: 3 | Status: SHIPPED | OUTPATIENT
Start: 2020-12-08 | End: 2022-02-25

## 2020-12-08 NOTE — PROGRESS NOTES
Subjective:       Patient ID: Filiberto Cohn is a 50 y.o. male.    Chief Complaint: Annual Exam    This dictation was performed using using [employer].   He presents for routine annual physical  Except for some lightheadedness on standing he has been doing well  He is very pleased with his response to Jardiance which has brought his A1c down  Entire chart reviewed, PMx, FHx, and Social History updated and reviewed.   has a past medical history of Diabetes mellitus type II and Hypertension.  History reviewed. No pertinent surgical history.  Diabetes Management Status    Statin: Taking  ACE/ARB: Taking    Screening or Prevention Patient's value Goal Complete/Controlled?   HgA1C Testing and Control   Lab Results   Component Value Date    HGBA1C 7.8 (H) 12/04/2020      Annually/Less than 8% Yes   Lipid profile : 08/28/2020 Annually Yes   LDL control Lab Results   Component Value Date    LDLCALC 80.2 08/28/2020    LDLCALC 80.2 08/28/2020    Annually/Less than 100 mg/dl  Yes   Nephropathy screening Lab Results   Component Value Date    LABMICR 34.0 03/13/2015     Lab Results   Component Value Date    PROTEINUA Negative 03/12/2015    Annually No   Blood pressure BP Readings from Last 1 Encounters:   12/08/20 110/70    Less than 140/90 Yes   Dilated retinal exam : 01/02/2018 Annually Yes   Foot exam   Most Recent Foot Exam Date: Not Found Annually No     Hemoglobin A1C   Date Value Ref Range Status   12/04/2020 7.8 (H) 4.0 - 5.6 % Final     Comment:     ADA Screening Guidelines:  5.7-6.4%  Consistent with prediabetes  >or=6.5%  Consistent with diabetes  High levels of fetal hemoglobin interfere with the HbA1C  assay. Heterozygous hemoglobin variants (HbS, HgC, etc)do  not significantly interfere with this assay.   However, presence of multiple variants may affect accuracy.     08/28/2020 8.3 (H) 4.0 - 5.6 % Final     Comment:     ADA Screening Guidelines:  5.7-6.4%  Consistent with prediabetes  >or=6.5%  Consistent with  diabetes  High levels of fetal hemoglobin interfere with the HbA1C  assay. Heterozygous hemoglobin variants (HbS, HgC, etc)do  not significantly interfere with this assay.   However, presence of multiple variants may affect accuracy.     08/28/2020 8.3 (H) 4.0 - 5.6 % Final     Comment:     ADA Screening Guidelines:  5.7-6.4%  Consistent with prediabetes  >or=6.5%  Consistent with diabetes  High levels of fetal hemoglobin interfere with the HbA1C  assay. Heterozygous hemoglobin variants (HbS, HgC, etc)do  not significantly interfere with this assay.   However, presence of multiple variants may affect accuracy.         HPI  Review of Systems   Constitutional: Negative for activity change and unexpected weight change.   HENT: Negative for hearing loss, rhinorrhea and trouble swallowing.    Eyes: Negative for discharge and visual disturbance.   Respiratory: Negative for chest tightness and wheezing.    Cardiovascular: Negative for chest pain and palpitations.   Gastrointestinal: Negative for blood in stool, constipation, diarrhea and vomiting.   Endocrine: Negative for polydipsia and polyuria.   Genitourinary: Negative for difficulty urinating, hematuria and urgency.   Musculoskeletal: Negative for arthralgias, joint swelling and neck pain.   Neurological: Positive for dizziness. Negative for weakness and headaches.   Psychiatric/Behavioral: Negative for confusion and dysphoric mood.     Review of systems is negative unless noted.  Objective:      Physical Exam  Vitals signs reviewed.   Constitutional:       General: He is not in acute distress.  HENT:      Head: Atraumatic.   Eyes:      General: No scleral icterus.     Conjunctiva/sclera: Conjunctivae normal.   Neck:      Musculoskeletal: Neck supple.   Cardiovascular:      Rate and Rhythm: Normal rate and regular rhythm.   Pulmonary:      Effort: Pulmonary effort is normal.      Breath sounds: Normal breath sounds.   Abdominal:      Palpations: Abdomen is soft.       Tenderness: There is no abdominal tenderness.   Musculoskeletal:      Comments: Protective Sensation (w/ 10 gram monofilament):  Right: Intact  Left: Intact    Visual Inspection:  Normal -  Bilateral    Pedal Pulses:   Right: Present  Left: Present    Posterior tibialis:   Right:Present  Left: Present     Lymphadenopathy:      Cervical: No cervical adenopathy.   Skin:     General: Skin is warm and dry.   Neurological:      Mental Status: He is alert and oriented to person, place, and time.   Psychiatric:         Behavior: Behavior normal.         Assessment:       1. Annual physical exam    2. Type 2 diabetes mellitus with hyperglycemia, without long-term current use of insulin    3. Mixed hyperlipidemia    4. Essential hypertension    5. Screening for prostate cancer    6. Screen for colon cancer    7. Light headedness        Plan:   Filiberto was seen today for annual exam.    Diagnoses and all orders for this visit:    Annual physical exam    Type 2 diabetes mellitus with hyperglycemia, without long-term current use of insulin, recheck A1c at 3 months and at 6 months with a CMP  -     empagliflozin (JARDIANCE) 25 mg tablet; Take 1 tablet (25 mg total) by mouth once daily.  -     metFORMIN (GLUCOPHAGE) 500 MG tablet; Take 2 tablets (1,000 mg total) by mouth 2 (two) times daily with meals.  -     Hemoglobin A1C; Future  -     Hemoglobin A1C; Future  -     Comprehensive Metabolic Panel; Future    Mixed hyperlipidemia    Essential hypertension    Screening for prostate cancer  -     PSA, Screening; Future August 30, 2021    Screen for colon cancer  -     Case Request Endoscopy: COLONOSCOPY    Light headedness, reduce losartan to 25 mg daily.  Call if symptoms persist.    Other orders  -     losartan (COZAAR) 25 MG tablet; Take 1 tablet (25 mg total) by mouth once daily.  -     pravastatin (PRAVACHOL) 20 MG tablet; Take 1 tablet (20 mg total) by mouth every morning.    Follow up in about 6 months (around 6/8/2021).

## 2021-01-04 ENCOUNTER — PATIENT MESSAGE (OUTPATIENT)
Dept: ADMINISTRATIVE | Facility: HOSPITAL | Age: 51
End: 2021-01-04

## 2021-01-20 ENCOUNTER — PATIENT OUTREACH (OUTPATIENT)
Dept: ADMINISTRATIVE | Facility: HOSPITAL | Age: 51
End: 2021-01-20

## 2021-01-20 DIAGNOSIS — E11.9 TYPE 2 DIABETES MELLITUS WITHOUT COMPLICATION: ICD-10-CM

## 2021-03-08 ENCOUNTER — PATIENT MESSAGE (OUTPATIENT)
Dept: INTERNAL MEDICINE | Facility: CLINIC | Age: 51
End: 2021-03-08

## 2021-03-09 ENCOUNTER — PATIENT MESSAGE (OUTPATIENT)
Dept: INTERNAL MEDICINE | Facility: CLINIC | Age: 51
End: 2021-03-09

## 2021-03-09 ENCOUNTER — LAB VISIT (OUTPATIENT)
Dept: LAB | Facility: HOSPITAL | Age: 51
End: 2021-03-09
Attending: INTERNAL MEDICINE
Payer: COMMERCIAL

## 2021-03-09 DIAGNOSIS — E11.65 TYPE 2 DIABETES MELLITUS WITH HYPERGLYCEMIA, WITHOUT LONG-TERM CURRENT USE OF INSULIN: ICD-10-CM

## 2021-03-09 DIAGNOSIS — Z11.59 ENCOUNTER FOR HEPATITIS C SCREENING TEST FOR LOW RISK PATIENT: Primary | ICD-10-CM

## 2021-03-09 LAB
ESTIMATED AVG GLUCOSE: 197 MG/DL (ref 68–131)
HBA1C MFR BLD: 8.5 % (ref 4–5.6)

## 2021-03-09 PROCEDURE — 83036 HEMOGLOBIN GLYCOSYLATED A1C: CPT | Performed by: INTERNAL MEDICINE

## 2021-03-09 PROCEDURE — 36415 COLL VENOUS BLD VENIPUNCTURE: CPT | Performed by: INTERNAL MEDICINE

## 2021-03-10 ENCOUNTER — PATIENT MESSAGE (OUTPATIENT)
Dept: INTERNAL MEDICINE | Facility: CLINIC | Age: 51
End: 2021-03-10

## 2021-03-20 ENCOUNTER — IMMUNIZATION (OUTPATIENT)
Dept: PRIMARY CARE CLINIC | Facility: CLINIC | Age: 51
End: 2021-03-20

## 2021-03-20 DIAGNOSIS — Z23 NEED FOR VACCINATION: Primary | ICD-10-CM

## 2021-03-20 PROCEDURE — 0001A PR IMMUNIZ ADMIN, SARS-COV-2 COVID-19 VACC, 30MCG/0.3ML, 1ST DOSE: ICD-10-PCS | Mod: CV19,S$GLB,, | Performed by: INTERNAL MEDICINE

## 2021-03-20 PROCEDURE — 91300 PR SARS-COV- 2 COVID-19 VACCINE, NO PRSV, 30MCG/0.3ML, IM: CPT | Mod: S$GLB,,, | Performed by: INTERNAL MEDICINE

## 2021-03-20 PROCEDURE — 0001A PR IMMUNIZ ADMIN, SARS-COV-2 COVID-19 VACC, 30MCG/0.3ML, 1ST DOSE: CPT | Mod: CV19,S$GLB,, | Performed by: INTERNAL MEDICINE

## 2021-03-20 PROCEDURE — 91300 PR SARS-COV- 2 COVID-19 VACCINE, NO PRSV, 30MCG/0.3ML, IM: ICD-10-PCS | Mod: S$GLB,,, | Performed by: INTERNAL MEDICINE

## 2021-03-20 RX ADMIN — Medication 0.3 ML: at 08:03

## 2021-04-05 ENCOUNTER — PATIENT MESSAGE (OUTPATIENT)
Dept: ADMINISTRATIVE | Facility: HOSPITAL | Age: 51
End: 2021-04-05

## 2021-04-11 ENCOUNTER — IMMUNIZATION (OUTPATIENT)
Dept: PRIMARY CARE CLINIC | Facility: CLINIC | Age: 51
End: 2021-04-11
Payer: COMMERCIAL

## 2021-04-11 DIAGNOSIS — Z23 NEED FOR VACCINATION: Primary | ICD-10-CM

## 2021-04-11 PROCEDURE — 91300 PR SARS-COV- 2 COVID-19 VACCINE, NO PRSV, 30MCG/0.3ML, IM: ICD-10-PCS | Mod: S$GLB,,, | Performed by: INTERNAL MEDICINE

## 2021-04-11 PROCEDURE — 91300 PR SARS-COV- 2 COVID-19 VACCINE, NO PRSV, 30MCG/0.3ML, IM: CPT | Mod: S$GLB,,, | Performed by: INTERNAL MEDICINE

## 2021-04-11 PROCEDURE — 0002A PR IMMUNIZ ADMIN, SARS-COV-2 COVID-19 VACC, 30MCG/0.3ML, 2ND DOSE: CPT | Mod: CV19,S$GLB,, | Performed by: INTERNAL MEDICINE

## 2021-04-11 PROCEDURE — 0002A PR IMMUNIZ ADMIN, SARS-COV-2 COVID-19 VACC, 30MCG/0.3ML, 2ND DOSE: ICD-10-PCS | Mod: CV19,S$GLB,, | Performed by: INTERNAL MEDICINE

## 2021-04-11 RX ADMIN — Medication 0.3 ML: at 08:04

## 2021-07-06 ENCOUNTER — PATIENT MESSAGE (OUTPATIENT)
Dept: ADMINISTRATIVE | Facility: HOSPITAL | Age: 51
End: 2021-07-06

## 2021-08-03 ENCOUNTER — PATIENT MESSAGE (OUTPATIENT)
Dept: ADMINISTRATIVE | Facility: HOSPITAL | Age: 51
End: 2021-08-03

## 2021-10-04 ENCOUNTER — PATIENT MESSAGE (OUTPATIENT)
Dept: ADMINISTRATIVE | Facility: HOSPITAL | Age: 51
End: 2021-10-04

## 2021-12-06 ENCOUNTER — LAB VISIT (OUTPATIENT)
Dept: LAB | Facility: HOSPITAL | Age: 51
End: 2021-12-06
Attending: INTERNAL MEDICINE
Payer: COMMERCIAL

## 2021-12-06 ENCOUNTER — PATIENT MESSAGE (OUTPATIENT)
Dept: INTERNAL MEDICINE | Facility: CLINIC | Age: 51
End: 2021-12-06
Payer: COMMERCIAL

## 2021-12-06 DIAGNOSIS — I10 ESSENTIAL HYPERTENSION: ICD-10-CM

## 2021-12-06 DIAGNOSIS — Z11.59 ENCOUNTER FOR HEPATITIS C SCREENING TEST FOR LOW RISK PATIENT: ICD-10-CM

## 2021-12-06 DIAGNOSIS — E11.65 TYPE 2 DIABETES MELLITUS WITH HYPERGLYCEMIA, WITHOUT LONG-TERM CURRENT USE OF INSULIN: ICD-10-CM

## 2021-12-06 DIAGNOSIS — Z11.4 SCREENING FOR HIV WITHOUT PRESENCE OF RISK FACTORS: ICD-10-CM

## 2021-12-06 DIAGNOSIS — E78.2 MIXED HYPERLIPIDEMIA: ICD-10-CM

## 2021-12-06 DIAGNOSIS — Z12.5 SCREENING FOR PROSTATE CANCER: ICD-10-CM

## 2021-12-06 LAB
ALBUMIN SERPL BCP-MCNC: 4.1 G/DL (ref 3.5–5.2)
ALBUMIN SERPL BCP-MCNC: 4.1 G/DL (ref 3.5–5.2)
ALP SERPL-CCNC: 52 U/L (ref 55–135)
ALP SERPL-CCNC: 52 U/L (ref 55–135)
ALT SERPL W/O P-5'-P-CCNC: 20 U/L (ref 10–44)
ALT SERPL W/O P-5'-P-CCNC: 20 U/L (ref 10–44)
ANION GAP SERPL CALC-SCNC: 11 MMOL/L (ref 8–16)
ANION GAP SERPL CALC-SCNC: 11 MMOL/L (ref 8–16)
AST SERPL-CCNC: 18 U/L (ref 10–40)
AST SERPL-CCNC: 18 U/L (ref 10–40)
BASOPHILS # BLD AUTO: 0.04 K/UL (ref 0–0.2)
BASOPHILS NFR BLD: 0.7 % (ref 0–1.9)
BILIRUB SERPL-MCNC: 0.3 MG/DL (ref 0.1–1)
BILIRUB SERPL-MCNC: 0.3 MG/DL (ref 0.1–1)
BUN SERPL-MCNC: 16 MG/DL (ref 6–20)
BUN SERPL-MCNC: 16 MG/DL (ref 6–20)
CALCIUM SERPL-MCNC: 9.3 MG/DL (ref 8.7–10.5)
CALCIUM SERPL-MCNC: 9.3 MG/DL (ref 8.7–10.5)
CHLORIDE SERPL-SCNC: 103 MMOL/L (ref 95–110)
CHLORIDE SERPL-SCNC: 103 MMOL/L (ref 95–110)
CHOLEST SERPL-MCNC: 126 MG/DL (ref 120–199)
CHOLEST SERPL-MCNC: 126 MG/DL (ref 120–199)
CHOLEST/HDLC SERPL: 3 {RATIO} (ref 2–5)
CHOLEST/HDLC SERPL: 3 {RATIO} (ref 2–5)
CO2 SERPL-SCNC: 24 MMOL/L (ref 23–29)
CO2 SERPL-SCNC: 24 MMOL/L (ref 23–29)
COMPLEXED PSA SERPL-MCNC: 0.65 NG/ML (ref 0–4)
COMPLEXED PSA SERPL-MCNC: 0.65 NG/ML (ref 0–4)
CREAT SERPL-MCNC: 0.8 MG/DL (ref 0.5–1.4)
CREAT SERPL-MCNC: 0.8 MG/DL (ref 0.5–1.4)
DIFFERENTIAL METHOD: ABNORMAL
EOSINOPHIL # BLD AUTO: 0.1 K/UL (ref 0–0.5)
EOSINOPHIL NFR BLD: 2.2 % (ref 0–8)
ERYTHROCYTE [DISTWIDTH] IN BLOOD BY AUTOMATED COUNT: 13.7 % (ref 11.5–14.5)
EST. GFR  (AFRICAN AMERICAN): >60 ML/MIN/1.73 M^2
EST. GFR  (AFRICAN AMERICAN): >60 ML/MIN/1.73 M^2
EST. GFR  (NON AFRICAN AMERICAN): >60 ML/MIN/1.73 M^2
EST. GFR  (NON AFRICAN AMERICAN): >60 ML/MIN/1.73 M^2
ESTIMATED AVG GLUCOSE: 240 MG/DL (ref 68–131)
ESTIMATED AVG GLUCOSE: 240 MG/DL (ref 68–131)
GLUCOSE SERPL-MCNC: 128 MG/DL (ref 70–110)
GLUCOSE SERPL-MCNC: 128 MG/DL (ref 70–110)
HBA1C MFR BLD: 10 % (ref 4–5.6)
HBA1C MFR BLD: 10 % (ref 4–5.6)
HCT VFR BLD AUTO: 38.2 % (ref 40–54)
HCV AB SERPL QL IA: NEGATIVE
HCV AB SERPL QL IA: NEGATIVE
HDLC SERPL-MCNC: 42 MG/DL (ref 40–75)
HDLC SERPL-MCNC: 42 MG/DL (ref 40–75)
HDLC SERPL: 33.3 % (ref 20–50)
HDLC SERPL: 33.3 % (ref 20–50)
HGB BLD-MCNC: 11.9 G/DL (ref 14–18)
HIV 1+2 AB+HIV1 P24 AG SERPL QL IA: NEGATIVE
IMM GRANULOCYTES # BLD AUTO: 0.01 K/UL (ref 0–0.04)
IMM GRANULOCYTES NFR BLD AUTO: 0.2 % (ref 0–0.5)
LDLC SERPL CALC-MCNC: 73.8 MG/DL (ref 63–159)
LDLC SERPL CALC-MCNC: 73.8 MG/DL (ref 63–159)
LYMPHOCYTES # BLD AUTO: 2 K/UL (ref 1–4.8)
LYMPHOCYTES NFR BLD: 36.4 % (ref 18–48)
MCH RBC QN AUTO: 25.8 PG (ref 27–31)
MCHC RBC AUTO-ENTMCNC: 31.2 G/DL (ref 32–36)
MCV RBC AUTO: 83 FL (ref 82–98)
MONOCYTES # BLD AUTO: 0.6 K/UL (ref 0.3–1)
MONOCYTES NFR BLD: 10.9 % (ref 4–15)
NEUTROPHILS # BLD AUTO: 2.7 K/UL (ref 1.8–7.7)
NEUTROPHILS NFR BLD: 49.6 % (ref 38–73)
NONHDLC SERPL-MCNC: 84 MG/DL
NONHDLC SERPL-MCNC: 84 MG/DL
NRBC BLD-RTO: 0 /100 WBC
PLATELET # BLD AUTO: 322 K/UL (ref 150–450)
PMV BLD AUTO: 10.7 FL (ref 9.2–12.9)
POTASSIUM SERPL-SCNC: 3.9 MMOL/L (ref 3.5–5.1)
POTASSIUM SERPL-SCNC: 3.9 MMOL/L (ref 3.5–5.1)
PROT SERPL-MCNC: 7.9 G/DL (ref 6–8.4)
PROT SERPL-MCNC: 7.9 G/DL (ref 6–8.4)
RBC # BLD AUTO: 4.61 M/UL (ref 4.6–6.2)
SODIUM SERPL-SCNC: 138 MMOL/L (ref 136–145)
SODIUM SERPL-SCNC: 138 MMOL/L (ref 136–145)
TRIGL SERPL-MCNC: 51 MG/DL (ref 30–150)
TRIGL SERPL-MCNC: 51 MG/DL (ref 30–150)
URATE SERPL-MCNC: 3.7 MG/DL (ref 3.4–7)
WBC # BLD AUTO: 5.41 K/UL (ref 3.9–12.7)

## 2021-12-06 PROCEDURE — 83036 HEMOGLOBIN GLYCOSYLATED A1C: CPT | Performed by: INTERNAL MEDICINE

## 2021-12-06 PROCEDURE — 84550 ASSAY OF BLOOD/URIC ACID: CPT | Performed by: INTERNAL MEDICINE

## 2021-12-06 PROCEDURE — 86803 HEPATITIS C AB TEST: CPT | Performed by: INTERNAL MEDICINE

## 2021-12-06 PROCEDURE — 80061 LIPID PANEL: CPT | Performed by: INTERNAL MEDICINE

## 2021-12-06 PROCEDURE — 85025 COMPLETE CBC W/AUTO DIFF WBC: CPT | Performed by: INTERNAL MEDICINE

## 2021-12-06 PROCEDURE — 87389 HIV-1 AG W/HIV-1&-2 AB AG IA: CPT | Performed by: INTERNAL MEDICINE

## 2021-12-06 PROCEDURE — 80053 COMPREHEN METABOLIC PANEL: CPT | Performed by: INTERNAL MEDICINE

## 2021-12-06 PROCEDURE — 36415 COLL VENOUS BLD VENIPUNCTURE: CPT | Performed by: INTERNAL MEDICINE

## 2021-12-06 PROCEDURE — 84153 ASSAY OF PSA TOTAL: CPT | Performed by: INTERNAL MEDICINE

## 2021-12-20 ENCOUNTER — OFFICE VISIT (OUTPATIENT)
Dept: INTERNAL MEDICINE | Facility: CLINIC | Age: 51
End: 2021-12-20
Payer: COMMERCIAL

## 2021-12-20 VITALS
OXYGEN SATURATION: 99 % | BODY MASS INDEX: 23.62 KG/M2 | HEART RATE: 94 BPM | SYSTOLIC BLOOD PRESSURE: 130 MMHG | DIASTOLIC BLOOD PRESSURE: 80 MMHG | WEIGHT: 190 LBS | HEIGHT: 75 IN

## 2021-12-20 DIAGNOSIS — E78.2 MIXED HYPERLIPIDEMIA: ICD-10-CM

## 2021-12-20 DIAGNOSIS — Z00.00 VISIT FOR ANNUAL HEALTH EXAMINATION: Primary | ICD-10-CM

## 2021-12-20 DIAGNOSIS — Z12.11 ENCOUNTER FOR SCREENING FOR MALIGNANT NEOPLASM OF COLON: ICD-10-CM

## 2021-12-20 DIAGNOSIS — I10 ESSENTIAL HYPERTENSION: ICD-10-CM

## 2021-12-20 DIAGNOSIS — F98.8 ATTENTION DEFICIT DISORDER, UNSPECIFIED HYPERACTIVITY PRESENCE: ICD-10-CM

## 2021-12-20 DIAGNOSIS — E11.65 TYPE 2 DIABETES MELLITUS WITH HYPERGLYCEMIA, WITHOUT LONG-TERM CURRENT USE OF INSULIN: ICD-10-CM

## 2021-12-20 PROCEDURE — 99999 PR PBB SHADOW E&M-EST. PATIENT-LVL IV: ICD-10-PCS | Mod: PBBFAC,,, | Performed by: INTERNAL MEDICINE

## 2021-12-20 PROCEDURE — 3008F PR BODY MASS INDEX (BMI) DOCUMENTED: ICD-10-PCS | Mod: CPTII,S$GLB,, | Performed by: INTERNAL MEDICINE

## 2021-12-20 PROCEDURE — 1160F RVW MEDS BY RX/DR IN RCRD: CPT | Mod: CPTII,S$GLB,, | Performed by: INTERNAL MEDICINE

## 2021-12-20 PROCEDURE — 1159F MED LIST DOCD IN RCRD: CPT | Mod: CPTII,S$GLB,, | Performed by: INTERNAL MEDICINE

## 2021-12-20 PROCEDURE — 1160F PR REVIEW ALL MEDS BY PRESCRIBER/CLIN PHARMACIST DOCUMENTED: ICD-10-PCS | Mod: CPTII,S$GLB,, | Performed by: INTERNAL MEDICINE

## 2021-12-20 PROCEDURE — 3046F PR MOST RECENT HEMOGLOBIN A1C LEVEL > 9.0%: ICD-10-PCS | Mod: CPTII,S$GLB,, | Performed by: INTERNAL MEDICINE

## 2021-12-20 PROCEDURE — 3079F PR MOST RECENT DIASTOLIC BLOOD PRESSURE 80-89 MM HG: ICD-10-PCS | Mod: CPTII,S$GLB,, | Performed by: INTERNAL MEDICINE

## 2021-12-20 PROCEDURE — 3046F HEMOGLOBIN A1C LEVEL >9.0%: CPT | Mod: CPTII,S$GLB,, | Performed by: INTERNAL MEDICINE

## 2021-12-20 PROCEDURE — 99396 PR PREVENTIVE VISIT,EST,40-64: ICD-10-PCS | Mod: S$GLB,,, | Performed by: INTERNAL MEDICINE

## 2021-12-20 PROCEDURE — 3075F PR MOST RECENT SYSTOLIC BLOOD PRESS GE 130-139MM HG: ICD-10-PCS | Mod: CPTII,S$GLB,, | Performed by: INTERNAL MEDICINE

## 2021-12-20 PROCEDURE — 3079F DIAST BP 80-89 MM HG: CPT | Mod: CPTII,S$GLB,, | Performed by: INTERNAL MEDICINE

## 2021-12-20 PROCEDURE — 3008F BODY MASS INDEX DOCD: CPT | Mod: CPTII,S$GLB,, | Performed by: INTERNAL MEDICINE

## 2021-12-20 PROCEDURE — 1159F PR MEDICATION LIST DOCUMENTED IN MEDICAL RECORD: ICD-10-PCS | Mod: CPTII,S$GLB,, | Performed by: INTERNAL MEDICINE

## 2021-12-20 PROCEDURE — 99999 PR PBB SHADOW E&M-EST. PATIENT-LVL IV: CPT | Mod: PBBFAC,,, | Performed by: INTERNAL MEDICINE

## 2021-12-20 PROCEDURE — 99396 PREV VISIT EST AGE 40-64: CPT | Mod: S$GLB,,, | Performed by: INTERNAL MEDICINE

## 2021-12-20 PROCEDURE — 3075F SYST BP GE 130 - 139MM HG: CPT | Mod: CPTII,S$GLB,, | Performed by: INTERNAL MEDICINE

## 2021-12-20 RX ORDER — DULAGLUTIDE 0.75 MG/.5ML
0.75 INJECTION, SOLUTION SUBCUTANEOUS
Qty: 4 PEN | Refills: 1 | Status: SHIPPED | OUTPATIENT
Start: 2021-12-20 | End: 2022-01-11

## 2021-12-20 RX ORDER — FLUTICASONE PROPIONATE 50 MCG
1 SPRAY, SUSPENSION (ML) NASAL DAILY
COMMUNITY

## 2022-01-10 ENCOUNTER — PATIENT MESSAGE (OUTPATIENT)
Dept: INTERNAL MEDICINE | Facility: CLINIC | Age: 52
End: 2022-01-10
Payer: COMMERCIAL

## 2022-01-11 DIAGNOSIS — E11.65 TYPE 2 DIABETES MELLITUS WITH HYPERGLYCEMIA, WITHOUT LONG-TERM CURRENT USE OF INSULIN: ICD-10-CM

## 2022-01-11 RX ORDER — DULAGLUTIDE 1.5 MG/.5ML
1.5 INJECTION, SOLUTION SUBCUTANEOUS
Qty: 4 PEN | Refills: 2 | Status: SHIPPED | OUTPATIENT
Start: 2022-01-11 | End: 2022-02-09 | Stop reason: DRUGHIGH

## 2022-01-19 ENCOUNTER — PATIENT MESSAGE (OUTPATIENT)
Dept: ADMINISTRATIVE | Facility: HOSPITAL | Age: 52
End: 2022-01-19
Payer: COMMERCIAL

## 2022-01-20 NOTE — TELEPHONE ENCOUNTER
No new care gaps identified.  Powered by [employer]. Reference number: [id number].   1/20/2022 10:28:00 AM CST

## 2022-01-26 ENCOUNTER — PATIENT MESSAGE (OUTPATIENT)
Dept: INTERNAL MEDICINE | Facility: CLINIC | Age: 52
End: 2022-01-26
Payer: COMMERCIAL

## 2022-01-26 RX ORDER — PRAVASTATIN SODIUM 20 MG/1
TABLET ORAL
Qty: 90 TABLET | Refills: 3 | Status: SHIPPED | OUTPATIENT
Start: 2022-01-26 | End: 2022-12-12 | Stop reason: SDUPTHER

## 2022-01-27 ENCOUNTER — PATIENT MESSAGE (OUTPATIENT)
Dept: INTERNAL MEDICINE | Facility: CLINIC | Age: 52
End: 2022-01-27
Payer: COMMERCIAL

## 2022-02-09 ENCOUNTER — PATIENT MESSAGE (OUTPATIENT)
Dept: ENDOSCOPY | Facility: HOSPITAL | Age: 52
End: 2022-02-09
Payer: COMMERCIAL

## 2022-02-09 ENCOUNTER — PATIENT MESSAGE (OUTPATIENT)
Dept: INTERNAL MEDICINE | Facility: CLINIC | Age: 52
End: 2022-02-09
Payer: COMMERCIAL

## 2022-02-09 DIAGNOSIS — Z01.818 PRE-OP TESTING: ICD-10-CM

## 2022-02-09 DIAGNOSIS — Z12.11 SPECIAL SCREENING FOR MALIGNANT NEOPLASMS, COLON: Primary | ICD-10-CM

## 2022-02-09 DIAGNOSIS — E11.65 TYPE 2 DIABETES MELLITUS WITH HYPERGLYCEMIA, WITHOUT LONG-TERM CURRENT USE OF INSULIN: ICD-10-CM

## 2022-02-09 RX ORDER — DULAGLUTIDE 3 MG/.5ML
3 INJECTION, SOLUTION SUBCUTANEOUS
Qty: 4 PEN | Refills: 3 | Status: SHIPPED | OUTPATIENT
Start: 2022-02-09 | End: 2022-03-15

## 2022-02-09 RX ORDER — POLYETHYLENE GLYCOL 3350, SODIUM SULFATE ANHYDROUS, SODIUM BICARBONATE, SODIUM CHLORIDE, POTASSIUM CHLORIDE 236; 22.74; 6.74; 5.86; 2.97 G/4L; G/4L; G/4L; G/4L; G/4L
4 POWDER, FOR SOLUTION ORAL ONCE
Qty: 4000 ML | Refills: 0 | Status: SHIPPED | OUTPATIENT
Start: 2022-02-09 | End: 2022-02-09

## 2022-02-10 ENCOUNTER — PATIENT MESSAGE (OUTPATIENT)
Dept: INTERNAL MEDICINE | Facility: CLINIC | Age: 52
End: 2022-02-10
Payer: COMMERCIAL

## 2022-02-11 ENCOUNTER — TELEPHONE (OUTPATIENT)
Dept: INTERNAL MEDICINE | Facility: CLINIC | Age: 52
End: 2022-02-11
Payer: COMMERCIAL

## 2022-02-11 NOTE — TELEPHONE ENCOUNTER
----- Message from Jaimie Rodriguez sent at 2/10/2022 11:27 AM CST -----  Contact: Patient [phone]  Pt states he was advised thru the portal:  Good morning Dr. Abelardo Gtz wanted to see you back to re-evaluate in 6 weeks from today, so in Mid-late March if possible!     Thank you for using [facility] and have a great day!     Citlaly SALAZAR [facility]   Office of MD [facility]     Dr Zhou next available appt is 05/16. Pt states to reach out thru the portal with an appt date in March.

## 2022-02-15 ENCOUNTER — PATIENT MESSAGE (OUTPATIENT)
Dept: INTERNAL MEDICINE | Facility: CLINIC | Age: 52
End: 2022-02-15
Payer: COMMERCIAL

## 2022-02-20 ENCOUNTER — PATIENT MESSAGE (OUTPATIENT)
Dept: INTERNAL MEDICINE | Facility: CLINIC | Age: 52
End: 2022-02-20
Payer: COMMERCIAL

## 2022-03-09 ENCOUNTER — PATIENT MESSAGE (OUTPATIENT)
Dept: INTERNAL MEDICINE | Facility: CLINIC | Age: 52
End: 2022-03-09
Payer: COMMERCIAL

## 2022-03-15 ENCOUNTER — PATIENT MESSAGE (OUTPATIENT)
Dept: INTERNAL MEDICINE | Facility: CLINIC | Age: 52
End: 2022-03-15
Payer: COMMERCIAL

## 2022-03-15 DIAGNOSIS — E11.65 TYPE 2 DIABETES MELLITUS WITH HYPERGLYCEMIA, WITHOUT LONG-TERM CURRENT USE OF INSULIN: ICD-10-CM

## 2022-03-15 RX ORDER — DULAGLUTIDE 4.5 MG/.5ML
4.5 INJECTION, SOLUTION SUBCUTANEOUS
Qty: 4 PEN | Refills: 11 | Status: SHIPPED | OUTPATIENT
Start: 2022-03-15 | End: 2022-05-04 | Stop reason: SDUPTHER

## 2022-03-16 ENCOUNTER — PATIENT MESSAGE (OUTPATIENT)
Dept: ADMINISTRATIVE | Facility: HOSPITAL | Age: 52
End: 2022-03-16
Payer: COMMERCIAL

## 2022-03-23 DIAGNOSIS — E11.9 TYPE 2 DIABETES MELLITUS WITHOUT COMPLICATION: ICD-10-CM

## 2022-03-26 ENCOUNTER — LAB VISIT (OUTPATIENT)
Dept: PRIMARY CARE CLINIC | Facility: CLINIC | Age: 52
End: 2022-03-26
Payer: COMMERCIAL

## 2022-03-26 DIAGNOSIS — Z01.818 PRE-OP TESTING: ICD-10-CM

## 2022-03-26 LAB
SARS-COV-2 RNA RESP QL NAA+PROBE: NOT DETECTED
SARS-COV-2- CYCLE NUMBER: NORMAL

## 2022-03-26 PROCEDURE — U0005 INFEC AGEN DETEC AMPLI PROBE: HCPCS | Performed by: CLINICAL NURSE SPECIALIST

## 2022-03-26 PROCEDURE — U0003 INFECTIOUS AGENT DETECTION BY NUCLEIC ACID (DNA OR RNA); SEVERE ACUTE RESPIRATORY SYNDROME CORONAVIRUS 2 (SARS-COV-2) (CORONAVIRUS DISEASE [COVID-19]), AMPLIFIED PROBE TECHNIQUE, MAKING USE OF HIGH THROUGHPUT TECHNOLOGIES AS DESCRIBED BY CMS-2020-01-R: HCPCS | Performed by: CLINICAL NURSE SPECIALIST

## 2022-03-29 ENCOUNTER — HOSPITAL ENCOUNTER (OUTPATIENT)
Facility: HOSPITAL | Age: 52
Discharge: HOME | End: 2022-03-29
Attending: COLON & RECTAL SURGERY | Admitting: COLON & RECTAL SURGERY
Payer: COMMERCIAL

## 2022-03-29 ENCOUNTER — ANESTHESIA EVENT (OUTPATIENT)
Dept: ENDOSCOPY | Facility: HOSPITAL | Age: 52
End: 2022-03-29
Payer: COMMERCIAL

## 2022-03-29 ENCOUNTER — ANESTHESIA (OUTPATIENT)
Dept: ENDOSCOPY | Facility: HOSPITAL | Age: 52
End: 2022-03-29
Payer: COMMERCIAL

## 2022-03-29 VITALS
DIASTOLIC BLOOD PRESSURE: 71 MMHG | TEMPERATURE: 98 F | HEIGHT: 75 IN | OXYGEN SATURATION: 98 % | RESPIRATION RATE: 18 BRPM | SYSTOLIC BLOOD PRESSURE: 127 MMHG | WEIGHT: 191 LBS | HEART RATE: 71 BPM | BODY MASS INDEX: 23.75 KG/M2

## 2022-03-29 DIAGNOSIS — Z12.11 ENCOUNTER FOR SCREENING COLONOSCOPY: ICD-10-CM

## 2022-03-29 LAB
GLUCOSE SERPL-MCNC: 144 MG/DL (ref 70–110)
POCT GLUCOSE: 144 MG/DL (ref 70–110)

## 2022-03-29 PROCEDURE — 37000009 HC ANESTHESIA EA ADD 15 MINS: Performed by: COLON & RECTAL SURGERY

## 2022-03-29 PROCEDURE — G0121 COLON CA SCRN NOT HI RSK IND: ICD-10-PCS | Mod: ,,, | Performed by: COLON & RECTAL SURGERY

## 2022-03-29 PROCEDURE — G0121 COLON CA SCRN NOT HI RSK IND: HCPCS | Mod: ,,, | Performed by: COLON & RECTAL SURGERY

## 2022-03-29 PROCEDURE — 37000008 HC ANESTHESIA 1ST 15 MINUTES: Performed by: COLON & RECTAL SURGERY

## 2022-03-29 PROCEDURE — E9220 PRA ENDO ANESTHESIA: ICD-10-PCS | Mod: ,,, | Performed by: NURSE ANESTHETIST, CERTIFIED REGISTERED

## 2022-03-29 PROCEDURE — 82962 GLUCOSE BLOOD TEST: CPT | Performed by: COLON & RECTAL SURGERY

## 2022-03-29 PROCEDURE — 25000003 PHARM REV CODE 250: Performed by: COLON & RECTAL SURGERY

## 2022-03-29 PROCEDURE — 25000003 PHARM REV CODE 250: Performed by: NURSE ANESTHETIST, CERTIFIED REGISTERED

## 2022-03-29 PROCEDURE — E9220 PRA ENDO ANESTHESIA: HCPCS | Mod: ,,, | Performed by: NURSE ANESTHETIST, CERTIFIED REGISTERED

## 2022-03-29 PROCEDURE — G0121 COLON CA SCRN NOT HI RSK IND: HCPCS | Performed by: COLON & RECTAL SURGERY

## 2022-03-29 PROCEDURE — 63600175 PHARM REV CODE 636 W HCPCS: Performed by: NURSE ANESTHETIST, CERTIFIED REGISTERED

## 2022-03-29 RX ORDER — LIDOCAINE HCL/PF 100 MG/5ML
SYRINGE (ML) INTRAVENOUS
Status: DISCONTINUED | OUTPATIENT
Start: 2022-03-29 | End: 2022-03-29

## 2022-03-29 RX ORDER — PROPOFOL 10 MG/ML
VIAL (ML) INTRAVENOUS
Status: DISCONTINUED | OUTPATIENT
Start: 2022-03-29 | End: 2022-03-29

## 2022-03-29 RX ORDER — PROPOFOL 10 MG/ML
VIAL (ML) INTRAVENOUS CONTINUOUS PRN
Status: DISCONTINUED | OUTPATIENT
Start: 2022-03-29 | End: 2022-03-29

## 2022-03-29 RX ORDER — SODIUM CHLORIDE 9 MG/ML
INJECTION, SOLUTION INTRAVENOUS CONTINUOUS
Status: DISCONTINUED | OUTPATIENT
Start: 2022-03-29 | End: 2022-03-29 | Stop reason: HOSPADM

## 2022-03-29 RX ADMIN — PROPOFOL 100 MG: 10 INJECTION, EMULSION INTRAVENOUS at 11:03

## 2022-03-29 RX ADMIN — PROPOFOL 150 MCG/KG/MIN: 10 INJECTION, EMULSION INTRAVENOUS at 11:03

## 2022-03-29 RX ADMIN — SODIUM CHLORIDE: 0.9 INJECTION, SOLUTION INTRAVENOUS at 10:03

## 2022-03-29 RX ADMIN — Medication 100 MG: at 11:03

## 2022-03-29 NOTE — PROVATION PATIENT INSTRUCTIONS
Discharge Summary/Instructions after an Endoscopic Procedure  Patient Name: Filiberto Cohn  Patient MRN: [id number]  Patient YOB: 1970 Tuesday, March 29, 2022  Uriel Gu MD  Dear patient,  As a result of recent federal legislation (The Federal Cures Act), you may   receive lab or pathology results from your procedure in your [facility]   account before your physician is able to contact you. Your physician or   their representative will relay the results to you with their   recommendations at their soonest availability.  Thank you,  RESTRICTIONS:  During your procedure today, you received medications for sedation.  These   medications may affect your judgment, balance and coordination.  Therefore,   for 24 hours, you have the following restrictions:   - DO NOT drive a car, operate machinery, make legal/financial decisions,   sign important papers or drink alcohol.    ACTIVITY:  Today: no heavy lifting, straining or running due to procedural   sedation/anesthesia.  The following day: return to full activity including work.  DIET:  Eat and drink normally unless instructed otherwise.     TREATMENT FOR COMMON SIDE EFFECTS:  - Mild abdominal pain, nausea, belching, bloating or excessive gas:  rest,   eat lightly and use a heating pad.  - Sore Throat: treat with throat lozenges and/or gargle with warm salt   water.  - Because air was used during the procedure, expelling large amounts of air   from your rectum or belching is normal.  - If a bowel prep was taken, you may not have a bowel movement for 1-3 days.    This is normal.  SYMPTOMS TO WATCH FOR AND REPORT TO YOUR PHYSICIAN:  1. Abdominal pain or bloating, other than gas cramps.  2. Chest pain.  3. Back pain.  4. Signs of infection such as: chills or fever occurring within 24 hours   after the procedure.  5. Rectal bleeding, which would show as bright red, maroon, or black stools.   (A tablespoon of blood from the rectum is not serious, especially if    hemorrhoids are present.)  6. Vomiting.  7. Weakness or dizziness.  GO DIRECTLY TO THE NEAREST EMERGENCY ROOM IF YOU HAVE ANY OF THE FOLLOWING:      Difficulty breathing              Chills and/or fever over 101 F   Persistent vomiting and/or vomiting blood   Severe abdominal pain   Severe chest pain   Black, tarry stools   Bleeding- more than one tablespoon   Any other symptom or condition that you feel may need urgent attention  Your doctor recommends these additional instructions:  If any biopsies were taken, your doctors clinic will contact you in 1 to 2   weeks with any results.  - Discharge patient to home (ambulatory).   - Patient has a contact number available for emergencies.  The signs and   symptoms of potential delayed complications were discussed with the   patient.  Return to normal activities tomorrow.  Written discharge   instructions were provided to the patient.   - Resume previous diet.   - Continue present medications.   - Repeat colonoscopy in 1 year for screening purposes and because the bowel   preparation was suboptimal.  For questions, problems or results please call your physician - Uriel Gu MD at Work:  [phone].  [facility], EMERGENCY ROOM PHONE NUMBER: [phone]  IF A COMPLICATION OR EMERGENCY SITUATION ARISES AND YOU ARE UNABLE TO REACH   YOUR PHYSICIAN - GO DIRECTLY TO THE EMERGENCY ROOM.  Uriel Gu MD  3/29/2022 11:44:12 AM  This report has been verified and signed electronically.  Dear patient,  As a result of recent federal legislation (The Federal Cures Act), you may   receive lab or pathology results from your procedure in your [facility]   account before your physician is able to contact you. Your physician or   their representative will relay the results to you with their   recommendations at their soonest availability.  Thank you,  PROVATION

## 2022-03-29 NOTE — PLAN OF CARE
AAO x3, walks without assist, asymptomatic. Procedure explained and all questions answered. Patient watched pre-procedure video and now awaits consents for procedure and anesthesia. Will continue to monitor.

## 2022-03-29 NOTE — H&P
COLONOSCOPY HISTORY AND PHYSICAL EXAM    Procedure : Colonoscopy      INDICATIONS: asymptomatic screening exam    Family Hx of CRC: No    Last Colonoscopy:  None  Findings: N/A       Past Medical History:   Diagnosis Date    Diabetes mellitus type II     Hyperlipidemia     Hypertension      Sedation Problems: NO  Family History   Problem Relation Age of Onset    Hypertension Mother     Cataracts Mother     Diabetes Mother     No Known Problems Father     Diabetes Sister     Hypertension Sister     No Known Problems Brother     No Known Problems Maternal Aunt     No Known Problems Maternal Uncle     No Known Problems Paternal Aunt     No Known Problems Paternal Uncle     Diabetes Maternal Grandmother     Cancer Maternal Grandmother     Diabetes Maternal Grandfather     Stroke Maternal Grandfather     Diabetes Paternal Grandmother     Diabetes Paternal Grandfather     Amblyopia Neg Hx     Blindness Neg Hx     Glaucoma Neg Hx     Macular degeneration Neg Hx     Retinal detachment Neg Hx     Strabismus Neg Hx     Thyroid disease Neg Hx      Fam Hx of Sedation Problems: NO  Social History     Socioeconomic History    Marital status:    Tobacco Use    Smoking status: Never Smoker    Smokeless tobacco: Never Used   Substance and Sexual Activity    Alcohol use: Yes     Comment: once weekly   Social History Narrative    July 2016    He is an [employer]     , his wife is an artist and also works at [employer] as a     Both of their children, son and daughter are thriving    They moved to [place] in order for him to accept this Worship in 2009 from [place].    He is happy professionally and personally.  His children are happy in school at [employer].    They enjoy the community of their Worship, there school and the large community.        Does not exercise.        Review of Systems - Negative except   Respiratory  ROS: no dyspnea  Cardiovascular ROS: no exertional chest pain  Gastrointestinal ROS: NO abdominal discomfort,  NO rectal bleeding  Musculoskeletal ROS: no muscular pain  Neurological ROS: no recent stroke    Physical Exam:  There were no vitals taken for this visit.  General: no distress  Head: normocephalic  Mallampati Score   Neck: supple, symmetrical, trachea midline  Lungs:  clear to auscultation bilaterally and normal respiratory effort  Heart: regular rate and rhythm and no murmur  Abdomen: soft, non-tender non-distented; bowel sounds normal; no masses,  no organomegaly  Extremities: no cyanosis or edema, or clubbing    ASA:  I    PLAN  COLONOSCOPY.    SedationPlan :MAC    The details of the procedure, the possible need for biopsy or polypectomy and the potential risks including bleeding, perforation, missed polyps were discussed in detail.

## 2022-03-29 NOTE — ANESTHESIA PREPROCEDURE EVALUATION
[facility]  Anesthesia Pre-Operative Evaluation       Patient Name: Filiberto Cohn  YOB: 1970  MRN: [id number]  [facility]: [id number]      Code Status: No Order   Date of Procedure: 3/29/2022  Anesthesia: General Procedure: Procedure(s) (LRB):  COLONOSCOPY (N/A)  Pre-Operative Diagnosis: Screen for colon cancer [Z12.11]  Proceduralist: Surgeon(s) and Role:     * CHRISTINE Gu MD - Primary Nurse: (Unknown)      SUBJECTIVE:   Filiberto Cohn is a 51 y.o. male who  has a past medical history of Diabetes mellitus type II, Hyperlipidemia, and Hypertension. No notes on file    Revised cardiac risk index (RCRI) score is 0.    he has a current medication list which includes the following long-term medication(s): dextroamphetamine-amphetamine, losartan, metformin, and pravastatin.   ALLERGIES:   Review of patient's allergies indicates:  No Known Allergies  LDA:      Lines/Drains/Airways     None               MEDICATIONS:     Antibiotics (From admission, onward)            None        VTE Risk Mitigation (From admission, onward)    None        No current facility-administered medications for this encounter.     Current Outpatient Medications   Medication Sig Dispense Refill    dextroamphetamine-amphetamine (ADDERALL XR) 15 MG 24 hr capsule Take 15 mg by mouth every morning.  0    dulaglutide (TRULICITY) 4.5 mg/0.5 mL pen injector Inject 4.5 mg into the skin every 7 days. 4 pen 11    fluticasone propionate (FLONASE) 50 mcg/actuation nasal spray 1 spray by Each Nostril route once daily.      ibuprofen (ADVIL,MOTRIN) 600 MG tablet   0    loratadine (CLARITIN) 10 mg tablet Take 10 mg by mouth once daily.      losartan (COZAAR) 25 MG tablet Take 1 tablet (25 mg total) by mouth once daily. 90 tablet 3    metFORMIN (GLUCOPHAGE) 500 MG tablet TAKE 2 TABLETS TWICE A DAY WITH MEALS 360 tablet 0    pravastatin (PRAVACHOL) 20 MG tablet TAKE 1 TABLET EVERY MORNING 90 tablet 3          History:   There  are no hospital problems to display for this patient.    Surgical History:    has a past surgical history that includes Trigger finger release (Left).   Social History:    has no history on file for sexual activity.  reports that he has never smoked. He has never used smokeless tobacco. He reports current alcohol use.     OBJECTIVE:     Vital Signs (Most Recent):    Vital Signs Range (Last 24H):          There is no height or weight on file to calculate BMI.   Wt Readings from Last 4 Encounters:   12/20/21 86.2 kg (190 lb)   12/08/20 86.7 kg (191 lb 2.2 oz)   03/07/20 86.2 kg (190 lb 0.6 oz)   01/13/20 88 kg (194 lb)     Significant Labs:  Lab Results   Component Value Date    WBC 5.41 12/06/2021    HGB 11.9 (L) 12/06/2021    HCT 38.2 (L) 12/06/2021     12/06/2021     12/06/2021     12/06/2021    K 3.9 12/06/2021    K 3.9 12/06/2021     12/06/2021     12/06/2021    CREATININE 0.8 12/06/2021    CREATININE 0.8 12/06/2021    BUN 16 12/06/2021    BUN 16 12/06/2021    CO2 24 12/06/2021    CO2 24 12/06/2021     (H) 12/06/2021     (H) 12/06/2021    CALCIUM 9.3 12/06/2021    CALCIUM 9.3 12/06/2021    ALKPHOS 52 (L) 12/06/2021    ALKPHOS 52 (L) 12/06/2021    ALT 20 12/06/2021    ALT 20 12/06/2021    AST 18 12/06/2021    AST 18 12/06/2021    ALBUMIN 4.1 12/06/2021    ALBUMIN 4.1 12/06/2021    HGBA1C 10.0 (H) 12/06/2021    HGBA1C 10.0 (H) 12/06/2021     No LMP for male patient.  Recent Results (from the past 72 hour(s))   COVID-19 Routine Screening    Collection Time: 03/26/22 10:34 AM   Result Value Ref Range    SARS-CoV2 (COVID-19) Qualitative PCR Not Detected Not Detected   SARS-COV-2- Cycle Number    Collection Time: 03/26/22 10:34 AM   Result Value Ref Range    SARS-COV-2- Cycle Number N/A        EKG:   Results for orders placed or performed during the hospital encounter of 03/12/15   EKG 12-lead    Collection Time: 03/12/15 12:00 PM    Narrative    Test Reason : 250.00  Blood  Pressure : * mmHG  Vent. Rate : 084 BPM     Atrial Rate : 084 BPM     P-R Int : 178 ms          QRS Dur : 094 ms      QT Int : 380 ms       P-R-T Axes : 051 004 043 degrees     QTc Int : 449 ms    Normal sinus rhythm  Normal ECG  No previous ECGs available  Confirmed by ASHVIN ALEGRIA MD ([id number]) on 3/12/2015 12:15:04 PM    Referred By: DARCY CORDERO           Confirmed By:ASHVIN ALEGRIA MD       TTE:  No results found for this or any previous visit.  No results found for: EF   No results found for this or any previous visit.  KWASI:  No results found for this or any previous visit.  Stress Test:  No results found for this or any previous visit.     LHC:  No results found for this or any previous visit.     PFT:  No results found for: FEV1, FVC, AOI7EEI, TLC, DLCO   ASSESSMENT/PLAN:         Pre-op Assessment    I have reviewed the Patient Summary Reports.     I have reviewed the Nursing Notes.    I have reviewed the Medications.     Review of Systems  Anesthesia Hx:  No problems with previous Anesthesia    Hematology/Oncology:  Hematology Normal   Oncology Normal     EENT/Dental:EENT/Dental Normal   Cardiovascular:   Exercise tolerance: good Hypertension    Pulmonary:  Pulmonary Normal    Renal/:  Renal/ Normal     Hepatic/GI:  Hepatic/GI Normal    Musculoskeletal:  Musculoskeletal Normal    Neurological:  Neurology Normal    Endocrine:   Diabetes    Dermatological:  Skin Normal    Psych:   Psychiatric History          Physical Exam  General: Well nourished    Airway:  Mallampati: III / II  Mouth Opening: Normal  TM Distance: Normal  Tongue: Normal  Neck ROM: Normal ROM    Dental:  Intact        Anesthesia Plan  Type of Anesthesia, risks & benefits discussed:    Anesthesia Type: Gen ETT, Gen Natural Airway  Intra-op Monitoring Plan: Standard ASA Monitors  Post Op Pain Control Plan: multimodal analgesia and IV/PO Opioids PRN  Induction:  IV  Airway Plan: Direct and Video, Post-Induction  Informed Consent: Informed  consent signed with the Patient and all parties understand the risks and agree with anesthesia plan.  All questions answered.   ASA Score: 2  Day of Surgery Review of History & Physical: H&P completed by Anesthesiologist.  Anesthesia Plan Notes: Chart reviewed, patient interviewed and examined.  The anesthetic plan was explained.  Risks, benefits, and alternatives were discussed. Questions were answered and the consent was signed.        QUETA Bingham M.D.         Ready For Surgery From Anesthesia Perspective.     .

## 2022-03-29 NOTE — TRANSFER OF CARE
"Anesthesia Transfer of Care Note    Patient: Filiberto Cohn    Procedure(s) Performed: Procedure(s) (LRB):  COLONOSCOPY (N/A)    Patient location: GI    Anesthesia Type: general    Transport from OR: Transported from OR on room air with adequate spontaneous ventilation    Post pain: adequate analgesia    Post assessment: no apparent anesthetic complications and tolerated procedure well    Post vital signs: stable    Level of consciousness: awake, alert and oriented    Nausea/Vomiting: no nausea/vomiting    Complications: none    Transfer of care protocol was followed      Last vitals:   Visit Vitals  BP (!) 135/93   Pulse 92   Temp 36.6 °C (97.9 °F)   Resp 16   Ht 6' 3" (1.905 m)   Wt 86.6 kg (191 lb)   SpO2 99%   BMI 23.87 kg/m²     "

## 2022-04-04 ENCOUNTER — OFFICE VISIT (OUTPATIENT)
Dept: INTERNAL MEDICINE | Facility: CLINIC | Age: 52
End: 2022-04-04
Payer: COMMERCIAL

## 2022-04-04 ENCOUNTER — LAB VISIT (OUTPATIENT)
Dept: LAB | Facility: HOSPITAL | Age: 52
End: 2022-04-04
Payer: COMMERCIAL

## 2022-04-04 VITALS
BODY MASS INDEX: 23.82 KG/M2 | WEIGHT: 191.56 LBS | DIASTOLIC BLOOD PRESSURE: 80 MMHG | HEIGHT: 75 IN | HEART RATE: 90 BPM | SYSTOLIC BLOOD PRESSURE: 140 MMHG | OXYGEN SATURATION: 98 %

## 2022-04-04 DIAGNOSIS — E11.65 TYPE 2 DIABETES MELLITUS WITH HYPERGLYCEMIA, WITHOUT LONG-TERM CURRENT USE OF INSULIN: Primary | ICD-10-CM

## 2022-04-04 DIAGNOSIS — E11.65 TYPE 2 DIABETES MELLITUS WITH HYPERGLYCEMIA, WITHOUT LONG-TERM CURRENT USE OF INSULIN: ICD-10-CM

## 2022-04-04 DIAGNOSIS — I10 ESSENTIAL HYPERTENSION: ICD-10-CM

## 2022-04-04 LAB
ESTIMATED AVG GLUCOSE: 263 MG/DL (ref 68–131)
HBA1C MFR BLD: 10.8 % (ref 4–5.6)

## 2022-04-04 PROCEDURE — 83036 HEMOGLOBIN GLYCOSYLATED A1C: CPT | Performed by: INTERNAL MEDICINE

## 2022-04-04 PROCEDURE — 3046F PR MOST RECENT HEMOGLOBIN A1C LEVEL > 9.0%: ICD-10-PCS | Mod: CPTII,S$GLB,, | Performed by: INTERNAL MEDICINE

## 2022-04-04 PROCEDURE — 1160F RVW MEDS BY RX/DR IN RCRD: CPT | Mod: CPTII,S$GLB,, | Performed by: INTERNAL MEDICINE

## 2022-04-04 PROCEDURE — 99213 PR OFFICE/OUTPT VISIT, EST, LEVL III, 20-29 MIN: ICD-10-PCS | Mod: S$GLB,,, | Performed by: INTERNAL MEDICINE

## 2022-04-04 PROCEDURE — 1159F MED LIST DOCD IN RCRD: CPT | Mod: CPTII,S$GLB,, | Performed by: INTERNAL MEDICINE

## 2022-04-04 PROCEDURE — 3008F PR BODY MASS INDEX (BMI) DOCUMENTED: ICD-10-PCS | Mod: CPTII,S$GLB,, | Performed by: INTERNAL MEDICINE

## 2022-04-04 PROCEDURE — 99999 PR PBB SHADOW E&M-EST. PATIENT-LVL III: ICD-10-PCS | Mod: PBBFAC,,, | Performed by: INTERNAL MEDICINE

## 2022-04-04 PROCEDURE — 3079F DIAST BP 80-89 MM HG: CPT | Mod: CPTII,S$GLB,, | Performed by: INTERNAL MEDICINE

## 2022-04-04 PROCEDURE — 3008F BODY MASS INDEX DOCD: CPT | Mod: CPTII,S$GLB,, | Performed by: INTERNAL MEDICINE

## 2022-04-04 PROCEDURE — 99213 OFFICE O/P EST LOW 20 MIN: CPT | Mod: S$GLB,,, | Performed by: INTERNAL MEDICINE

## 2022-04-04 PROCEDURE — 3077F PR MOST RECENT SYSTOLIC BLOOD PRESSURE >= 140 MM HG: ICD-10-PCS | Mod: CPTII,S$GLB,, | Performed by: INTERNAL MEDICINE

## 2022-04-04 PROCEDURE — 3046F HEMOGLOBIN A1C LEVEL >9.0%: CPT | Mod: CPTII,S$GLB,, | Performed by: INTERNAL MEDICINE

## 2022-04-04 PROCEDURE — 3077F SYST BP >= 140 MM HG: CPT | Mod: CPTII,S$GLB,, | Performed by: INTERNAL MEDICINE

## 2022-04-04 PROCEDURE — 36415 COLL VENOUS BLD VENIPUNCTURE: CPT | Performed by: INTERNAL MEDICINE

## 2022-04-04 PROCEDURE — 1160F PR REVIEW ALL MEDS BY PRESCRIBER/CLIN PHARMACIST DOCUMENTED: ICD-10-PCS | Mod: CPTII,S$GLB,, | Performed by: INTERNAL MEDICINE

## 2022-04-04 PROCEDURE — 99999 PR PBB SHADOW E&M-EST. PATIENT-LVL III: CPT | Mod: PBBFAC,,, | Performed by: INTERNAL MEDICINE

## 2022-04-04 PROCEDURE — 3079F PR MOST RECENT DIASTOLIC BLOOD PRESSURE 80-89 MM HG: ICD-10-PCS | Mod: CPTII,S$GLB,, | Performed by: INTERNAL MEDICINE

## 2022-04-04 PROCEDURE — 1159F PR MEDICATION LIST DOCUMENTED IN MEDICAL RECORD: ICD-10-PCS | Mod: CPTII,S$GLB,, | Performed by: INTERNAL MEDICINE

## 2022-04-04 NOTE — PROGRESS NOTES
"INTERNAL MEDICINE ESTABLISHED PATIENT VISIT NOTE    Subjective:     Chief Complaint: Diabetes       Patient ID: Filiberto Cohn is a 51 y.o. male with ADHD, HTN, HLD, T2DM (12/2021 HgbA1C 10), last seen by me 12/2021, here today for follow-up.    Switched from Jardiance to Trulicity at last visit due to uncontrolled T2DM - titrated to 4.5mg weekly most recently. However, currently finishing 3mg as both doses arrived at same time. Will complete 3mg dose in 2 weeks, then switch to 4.5mg dose.     AM BG running 200-250s, rarely goes below 200. Exercising 6 times weekly, doing 30min of steps each AM. Closely following diabetic diet, cooking more at home. Does note nighttime snacks increase AM BG.     Completed eye exam 3/2022, no retinopathy. Does need bifocals.     Stressful period at work given Easter is upcoming.     Past Medical History:  Past Medical History:   Diagnosis Date    Diabetes mellitus type II     Hyperlipidemia     Hypertension           Review of Systems:  Review of Systems   Constitutional: Negative for chills and fever.   HENT: Negative for congestion.    Respiratory: Negative for cough and shortness of breath.    Cardiovascular: Negative for chest pain.   Gastrointestinal: Negative for constipation, nausea and vomiting.   Genitourinary: Negative for hematuria and urgency.   Musculoskeletal: Negative for falls.   Skin: Negative for rash.   Neurological: Negative for dizziness and loss of consciousness.       Health Maintenance:   Immunizations:   Influenza - complete  Tdap - next visit  Covid 19 - complete  HPV  Prevnar rec at 65 - Pneumovax 10/2017  Shingrix rec at 50 - next visit     Cancer Screening:  PAP: n/a  Mammogram:  n/a  Colonoscopy:  3/2022 suboptimal prep, repeat 1 yr  DEXA:  n/a     Objective:   BP (!) 140/80 (BP Location: Left arm, Patient Position: Sitting)   Pulse 90   Ht 6' 3" (1.905 m)   Wt 86.9 kg (191 lb 9.3 oz)   SpO2 98%   BMI 23.95 kg/m²        General: AAO x3, no apparent " distress  HEENT: PERRL  CV: RRR, no m/r/g  Pulm: Lungs CTAB, no crackles, no wheezes  Abd: s/NT/ND +BS  Extremities: no c/c/e    Labs:       Assessment/Plan     Type 2 diabetes mellitus with hyperglycemia, without long-term current use of insulin  12/2021 HgbA1C 10  Continue Trulicity at current dose, will repeat HgbA1C and adjust regimen accordingly  -     Hemoglobin A1C; Future; Expected date: 04/04/2022    Essential hypertension  Elevated, ongoing stress at work   Adherent to anti-hypertensives  Advised to check BP regularly    HM as above    RTC in 3 mo, sooner if needed.    Aleja Zhou MD  Department of Internal Medicine - [facility]  04/04/2022

## 2022-04-06 ENCOUNTER — PATIENT MESSAGE (OUTPATIENT)
Dept: INTERNAL MEDICINE | Facility: CLINIC | Age: 52
End: 2022-04-06
Payer: COMMERCIAL

## 2022-04-18 ENCOUNTER — PATIENT MESSAGE (OUTPATIENT)
Dept: INTERNAL MEDICINE | Facility: CLINIC | Age: 52
End: 2022-04-18
Payer: COMMERCIAL

## 2022-05-03 ENCOUNTER — PATIENT MESSAGE (OUTPATIENT)
Dept: INTERNAL MEDICINE | Facility: CLINIC | Age: 52
End: 2022-05-03
Payer: COMMERCIAL

## 2022-05-03 DIAGNOSIS — E11.65 TYPE 2 DIABETES MELLITUS WITH HYPERGLYCEMIA, WITHOUT LONG-TERM CURRENT USE OF INSULIN: ICD-10-CM

## 2022-05-04 RX ORDER — DULAGLUTIDE 4.5 MG/.5ML
4.5 INJECTION, SOLUTION SUBCUTANEOUS
Qty: 12 PEN | Refills: 1 | Status: SHIPPED | OUTPATIENT
Start: 2022-05-04 | End: 2022-07-12 | Stop reason: SDUPTHER

## 2022-05-09 ENCOUNTER — PATIENT MESSAGE (OUTPATIENT)
Dept: INTERNAL MEDICINE | Facility: CLINIC | Age: 52
End: 2022-05-09
Payer: COMMERCIAL

## 2022-06-01 ENCOUNTER — PATIENT MESSAGE (OUTPATIENT)
Dept: ADMINISTRATIVE | Facility: HOSPITAL | Age: 52
End: 2022-06-01
Payer: COMMERCIAL

## 2022-07-12 ENCOUNTER — PATIENT MESSAGE (OUTPATIENT)
Dept: INTERNAL MEDICINE | Facility: CLINIC | Age: 52
End: 2022-07-12
Payer: COMMERCIAL

## 2022-07-12 DIAGNOSIS — E11.65 TYPE 2 DIABETES MELLITUS WITH HYPERGLYCEMIA, WITHOUT LONG-TERM CURRENT USE OF INSULIN: ICD-10-CM

## 2022-07-12 DIAGNOSIS — E11.65 TYPE 2 DIABETES MELLITUS WITH HYPERGLYCEMIA, WITHOUT LONG-TERM CURRENT USE OF INSULIN: Primary | ICD-10-CM

## 2022-07-12 RX ORDER — DULAGLUTIDE 4.5 MG/.5ML
4.5 INJECTION, SOLUTION SUBCUTANEOUS
Qty: 12 PEN | Refills: 3 | Status: SHIPPED | OUTPATIENT
Start: 2022-07-12 | End: 2023-01-17 | Stop reason: SDUPTHER

## 2022-07-12 RX ORDER — PIOGLITAZONEHYDROCHLORIDE 15 MG/1
15 TABLET ORAL DAILY
Qty: 90 TABLET | Refills: 0 | Status: SHIPPED | OUTPATIENT
Start: 2022-07-12 | End: 2022-12-12

## 2022-07-12 NOTE — TELEPHONE ENCOUNTER
Care Due:                  Date            Visit Type   Department     Provider  --------------------------------------------------------------------------------                                EP -                              PRIMARY      [facility] INTERNAL  Last Visit: 04-      CARE ([facility])   [facility]                              EP -                              PRIMARY      [facility] INTERNAL  Next Visit: 08-      CARE ([facility])   [facility]                                                            Last  Test          Frequency    Reason                     Performed    Due Date  --------------------------------------------------------------------------------    HBA1C.......  6 months...  dulaglutide, metFORMIN...  04-   10-    [facility] Embedded Care Gaps. Reference number: [id number]. 7/12/2022   3:43:33 PM CDT

## 2022-08-04 ENCOUNTER — OFFICE VISIT (OUTPATIENT)
Dept: INTERNAL MEDICINE | Facility: CLINIC | Age: 52
End: 2022-08-04
Payer: COMMERCIAL

## 2022-08-04 VITALS
BODY MASS INDEX: 22.89 KG/M2 | WEIGHT: 184.06 LBS | DIASTOLIC BLOOD PRESSURE: 80 MMHG | SYSTOLIC BLOOD PRESSURE: 121 MMHG | OXYGEN SATURATION: 98 % | HEART RATE: 100 BPM | HEIGHT: 75 IN

## 2022-08-04 DIAGNOSIS — M67.921 TENDINOPATHY OF RIGHT ELBOW: ICD-10-CM

## 2022-08-04 DIAGNOSIS — E11.65 TYPE 2 DIABETES MELLITUS WITH HYPERGLYCEMIA, WITHOUT LONG-TERM CURRENT USE OF INSULIN: Primary | ICD-10-CM

## 2022-08-04 DIAGNOSIS — R11.0 NAUSEA: ICD-10-CM

## 2022-08-04 LAB
CREAT UR-MCNC: 185 MG/DL (ref 23–375)
PROT UR-MCNC: 12 MG/DL (ref 0–15)
PROT/CREAT UR: 0.06 MG/G{CREAT} (ref 0–0.2)

## 2022-08-04 PROCEDURE — 99214 OFFICE O/P EST MOD 30 MIN: CPT | Mod: S$GLB,,, | Performed by: INTERNAL MEDICINE

## 2022-08-04 PROCEDURE — 3079F PR MOST RECENT DIASTOLIC BLOOD PRESSURE 80-89 MM HG: ICD-10-PCS | Mod: CPTII,S$GLB,, | Performed by: INTERNAL MEDICINE

## 2022-08-04 PROCEDURE — 4010F PR ACE/ARB THEARPY RXD/TAKEN: ICD-10-PCS | Mod: CPTII,S$GLB,, | Performed by: INTERNAL MEDICINE

## 2022-08-04 PROCEDURE — 3008F BODY MASS INDEX DOCD: CPT | Mod: CPTII,S$GLB,, | Performed by: INTERNAL MEDICINE

## 2022-08-04 PROCEDURE — 1159F MED LIST DOCD IN RCRD: CPT | Mod: CPTII,S$GLB,, | Performed by: INTERNAL MEDICINE

## 2022-08-04 PROCEDURE — 3079F DIAST BP 80-89 MM HG: CPT | Mod: CPTII,S$GLB,, | Performed by: INTERNAL MEDICINE

## 2022-08-04 PROCEDURE — 82570 ASSAY OF URINE CREATININE: CPT | Performed by: INTERNAL MEDICINE

## 2022-08-04 PROCEDURE — 3046F PR MOST RECENT HEMOGLOBIN A1C LEVEL > 9.0%: ICD-10-PCS | Mod: CPTII,S$GLB,, | Performed by: INTERNAL MEDICINE

## 2022-08-04 PROCEDURE — 99999 PR PBB SHADOW E&M-EST. PATIENT-LVL V: CPT | Mod: PBBFAC,,, | Performed by: INTERNAL MEDICINE

## 2022-08-04 PROCEDURE — 3046F HEMOGLOBIN A1C LEVEL >9.0%: CPT | Mod: CPTII,S$GLB,, | Performed by: INTERNAL MEDICINE

## 2022-08-04 PROCEDURE — 1159F PR MEDICATION LIST DOCUMENTED IN MEDICAL RECORD: ICD-10-PCS | Mod: CPTII,S$GLB,, | Performed by: INTERNAL MEDICINE

## 2022-08-04 PROCEDURE — 99999 PR PBB SHADOW E&M-EST. PATIENT-LVL V: ICD-10-PCS | Mod: PBBFAC,,, | Performed by: INTERNAL MEDICINE

## 2022-08-04 PROCEDURE — 1160F RVW MEDS BY RX/DR IN RCRD: CPT | Mod: CPTII,S$GLB,, | Performed by: INTERNAL MEDICINE

## 2022-08-04 PROCEDURE — 3074F PR MOST RECENT SYSTOLIC BLOOD PRESSURE < 130 MM HG: ICD-10-PCS | Mod: CPTII,S$GLB,, | Performed by: INTERNAL MEDICINE

## 2022-08-04 PROCEDURE — 99214 PR OFFICE/OUTPT VISIT, EST, LEVL IV, 30-39 MIN: ICD-10-PCS | Mod: S$GLB,,, | Performed by: INTERNAL MEDICINE

## 2022-08-04 PROCEDURE — 4010F ACE/ARB THERAPY RXD/TAKEN: CPT | Mod: CPTII,S$GLB,, | Performed by: INTERNAL MEDICINE

## 2022-08-04 PROCEDURE — 3074F SYST BP LT 130 MM HG: CPT | Mod: CPTII,S$GLB,, | Performed by: INTERNAL MEDICINE

## 2022-08-04 PROCEDURE — 1160F PR REVIEW ALL MEDS BY PRESCRIBER/CLIN PHARMACIST DOCUMENTED: ICD-10-PCS | Mod: CPTII,S$GLB,, | Performed by: INTERNAL MEDICINE

## 2022-08-04 PROCEDURE — 3008F PR BODY MASS INDEX (BMI) DOCUMENTED: ICD-10-PCS | Mod: CPTII,S$GLB,, | Performed by: INTERNAL MEDICINE

## 2022-08-04 NOTE — PROGRESS NOTES
"INTERNAL MEDICINE ESTABLISHED PATIENT VISIT NOTE    Subjective:     Chief Complaint: Follow-up       Patient ID: Filiberto Cohn is a 52 y.o. male with ADHD, HTN, HLD, uncontrolled T2DM (4/2022 HgbA1C 10.8), last seen by me 4/2022, here today for follow-up.    Actos recently added to improve blood sugars. No improvement, reports fasting -250s. Adherent to Trulicity 4.5mg, Metformin 1g BID.      Has not been exercising the past few weeks. Eats more in evening time after effects of Adderall wear off.     Has noticed frequent loose stools since starting Actos, about 2 daily now, along with mild nausea in AM. Some heartburn, thinks stress related. No abdominal pain, vomiting.     Persistent bout of tennis elbow.     Past Medical History:  Past Medical History:   Diagnosis Date    Diabetes mellitus type II     Hyperlipidemia     Hypertension        Review of Systems:  Review of Systems   Constitutional: Negative for chills and fever.   HENT: Negative for congestion.    Respiratory: Negative for cough and shortness of breath.    Cardiovascular: Negative for chest pain.   Gastrointestinal: Positive for diarrhea, heartburn and nausea. Negative for constipation and vomiting.   Genitourinary: Negative for hematuria and urgency.   Musculoskeletal: Negative for falls.   Skin: Negative for rash.   Neurological: Negative for dizziness and loss of consciousness.       Health Maintenance:   Immunizations:   Influenza - complete  Tdap - next visit  Covid 19 - complete  HPV  Prevnar rec at 65 - Pneumovax 10/2017  Shingrix rec at 50 - next visit     Cancer Screening:  PAP: n/a  Mammogram:  n/a  Colonoscopy:  3/2022 suboptimal prep, repeat 1 yr  DEXA:  n/a     Objective:   /80 (BP Location: Left arm, Patient Position: Sitting, BP Method: Medium (Manual))   Pulse 100   Ht 6' 3" (1.905 m)   Wt 83.5 kg (184 lb 1.4 oz)   SpO2 98%   BMI 23.01 kg/m²        General: AAO x3, no apparent distress  HEENT: PERRL  CV: RRR, no " m/r/g  Pulm: Lungs CTAB, no crackles, no wheezes  Abd: s/ND +BS, mild epigastric tenderness  Extremities: no c/c/e    Labs:       Assessment/Plan     Type 2 diabetes mellitus with hyperglycemia, without long-term current use of insulin  Uncontrolled despite trial of multiple medications, 4/2022 HgbA1C 10.8  Continue current regimen, refer to Endocrinology  -     Ambulatory referral/consult to Endocrinology; Future; Expected date: 08/11/2022  -     COMPREHENSIVE METABOLIC PANEL; Future; Expected date: 08/04/2022  -     Protein / creatinine ratio, urine    Nausea  Check H. Pylori given constellation of symptoms + PE findings  -     CBC W/ AUTO DIFFERENTIAL; Future; Expected date: 08/04/2022  -     H. pylori antigen, stool; Future; Expected date: 08/04/2022  -     LIPASE; Future; Expected date: 08/04/2022    Tendinopathy of right elbow  Discussed use of compression sleeve; provided with handout of exercises     HM as above    RTC in 4 mo, sooner if needed.    Aleja Zhou MD  Department of Internal Medicine - [facility]  08/04/2022

## 2022-08-04 NOTE — Clinical Note
Would appreciate any thoughts on improving pt's HgbA1C. In addition to current medications, previous trial of Invokana and Jardiance with mixed results. Planning to repeat labs - please let me know if anything else should be added.

## 2022-08-24 ENCOUNTER — PATIENT MESSAGE (OUTPATIENT)
Dept: ADMINISTRATIVE | Facility: HOSPITAL | Age: 52
End: 2022-08-24
Payer: COMMERCIAL

## 2022-09-12 ENCOUNTER — OFFICE VISIT (OUTPATIENT)
Dept: ENDOCRINOLOGY | Facility: CLINIC | Age: 52
End: 2022-09-12
Payer: COMMERCIAL

## 2022-09-12 VITALS
BODY MASS INDEX: 23.28 KG/M2 | HEART RATE: 100 BPM | SYSTOLIC BLOOD PRESSURE: 130 MMHG | DIASTOLIC BLOOD PRESSURE: 80 MMHG | OXYGEN SATURATION: 98 % | HEIGHT: 75 IN | WEIGHT: 187.25 LBS

## 2022-09-12 DIAGNOSIS — E78.5 HYPERLIPIDEMIA, UNSPECIFIED HYPERLIPIDEMIA TYPE: ICD-10-CM

## 2022-09-12 DIAGNOSIS — E11.65 TYPE 2 DIABETES MELLITUS WITH HYPERGLYCEMIA, WITHOUT LONG-TERM CURRENT USE OF INSULIN: ICD-10-CM

## 2022-09-12 DIAGNOSIS — I10 HYPERTENSION, ESSENTIAL: ICD-10-CM

## 2022-09-12 DIAGNOSIS — E11.65 UNCONTROLLED TYPE 2 DIABETES MELLITUS WITH HYPERGLYCEMIA: Primary | ICD-10-CM

## 2022-09-12 PROCEDURE — 3079F PR MOST RECENT DIASTOLIC BLOOD PRESSURE 80-89 MM HG: ICD-10-PCS | Mod: CPTII,S$GLB,, | Performed by: GENERAL ACUTE CARE HOSPITAL

## 2022-09-12 PROCEDURE — 3008F PR BODY MASS INDEX (BMI) DOCUMENTED: ICD-10-PCS | Mod: CPTII,S$GLB,, | Performed by: GENERAL ACUTE CARE HOSPITAL

## 2022-09-12 PROCEDURE — 3046F PR MOST RECENT HEMOGLOBIN A1C LEVEL > 9.0%: ICD-10-PCS | Mod: CPTII,S$GLB,, | Performed by: GENERAL ACUTE CARE HOSPITAL

## 2022-09-12 PROCEDURE — 99204 OFFICE O/P NEW MOD 45 MIN: CPT | Mod: S$GLB,,, | Performed by: GENERAL ACUTE CARE HOSPITAL

## 2022-09-12 PROCEDURE — 1160F RVW MEDS BY RX/DR IN RCRD: CPT | Mod: CPTII,S$GLB,, | Performed by: GENERAL ACUTE CARE HOSPITAL

## 2022-09-12 PROCEDURE — 4010F PR ACE/ARB THEARPY RXD/TAKEN: ICD-10-PCS | Mod: CPTII,S$GLB,, | Performed by: GENERAL ACUTE CARE HOSPITAL

## 2022-09-12 PROCEDURE — 99204 PR OFFICE/OUTPT VISIT, NEW, LEVL IV, 45-59 MIN: ICD-10-PCS | Mod: S$GLB,,, | Performed by: GENERAL ACUTE CARE HOSPITAL

## 2022-09-12 PROCEDURE — 3008F BODY MASS INDEX DOCD: CPT | Mod: CPTII,S$GLB,, | Performed by: GENERAL ACUTE CARE HOSPITAL

## 2022-09-12 PROCEDURE — 3075F PR MOST RECENT SYSTOLIC BLOOD PRESS GE 130-139MM HG: ICD-10-PCS | Mod: CPTII,S$GLB,, | Performed by: GENERAL ACUTE CARE HOSPITAL

## 2022-09-12 PROCEDURE — 1159F MED LIST DOCD IN RCRD: CPT | Mod: CPTII,S$GLB,, | Performed by: GENERAL ACUTE CARE HOSPITAL

## 2022-09-12 PROCEDURE — 3046F HEMOGLOBIN A1C LEVEL >9.0%: CPT | Mod: CPTII,S$GLB,, | Performed by: GENERAL ACUTE CARE HOSPITAL

## 2022-09-12 PROCEDURE — 4010F ACE/ARB THERAPY RXD/TAKEN: CPT | Mod: CPTII,S$GLB,, | Performed by: GENERAL ACUTE CARE HOSPITAL

## 2022-09-12 PROCEDURE — 99999 PR PBB SHADOW E&M-EST. PATIENT-LVL V: CPT | Mod: PBBFAC,,, | Performed by: GENERAL ACUTE CARE HOSPITAL

## 2022-09-12 PROCEDURE — 3075F SYST BP GE 130 - 139MM HG: CPT | Mod: CPTII,S$GLB,, | Performed by: GENERAL ACUTE CARE HOSPITAL

## 2022-09-12 PROCEDURE — 1160F PR REVIEW ALL MEDS BY PRESCRIBER/CLIN PHARMACIST DOCUMENTED: ICD-10-PCS | Mod: CPTII,S$GLB,, | Performed by: GENERAL ACUTE CARE HOSPITAL

## 2022-09-12 PROCEDURE — 99999 PR PBB SHADOW E&M-EST. PATIENT-LVL V: ICD-10-PCS | Mod: PBBFAC,,, | Performed by: GENERAL ACUTE CARE HOSPITAL

## 2022-09-12 PROCEDURE — 1159F PR MEDICATION LIST DOCUMENTED IN MEDICAL RECORD: ICD-10-PCS | Mod: CPTII,S$GLB,, | Performed by: GENERAL ACUTE CARE HOSPITAL

## 2022-09-12 PROCEDURE — 3079F DIAST BP 80-89 MM HG: CPT | Mod: CPTII,S$GLB,, | Performed by: GENERAL ACUTE CARE HOSPITAL

## 2022-09-12 RX ORDER — DAPAGLIFLOZIN 5 MG/1
5 TABLET, FILM COATED ORAL DAILY
Qty: 30 TABLET | Refills: 11 | Status: CANCELLED | OUTPATIENT
Start: 2022-09-12

## 2022-09-12 RX ORDER — EMPAGLIFLOZIN 10 MG/1
10 TABLET, FILM COATED ORAL DAILY
Qty: 30 TABLET | Refills: 11 | Status: SHIPPED | OUTPATIENT
Start: 2022-09-12 | End: 2022-12-12

## 2022-09-12 RX ORDER — GLIMEPIRIDE 4 MG/1
4 TABLET ORAL
Qty: 30 TABLET | Refills: 11 | Status: SHIPPED | OUTPATIENT
Start: 2022-09-12 | End: 2023-01-04

## 2022-09-12 NOTE — PATIENT INSTRUCTIONS
At this time your diabetes seems to be uncontrolled and we need to help you improve your sugar numbers to keep your heart, brain, eyes and kidneys protected.       We will stop Actos today to see if Actos is the cause of your nausea.     We will start Jardiance 10mg once a day.  (Stay hydrated with Jardiance)     Start Glimepiride 4mg once a day with breakfast or first meal of the day.    Glimepiride can cause low blood sugars if you take it and skip breakfast.      Continue with Trulicity 4.5mg injection once a week.   Try doing smaller meals the day of the injection to avoid nausea.  We may need to re consider if still having nausea after stopping Actos.       Continue with metformin 1,000mg twice a day.      Please send me some sugar numbers in 1-2 weeks for my review.     We will do blood work to evaluate if your pancreas is producing insulin.     150 grams of carbohydrates daily MAX,  (50 grams or less per meal)     Avoid sugary drinks (Sodas, Sweet Tea, Juices with added sugar, Soft drinks)     Check your sugars 3-4 times daily (Before meals and at bedtime)     Sugar goals before breakfast (70 - 130)  Sugars 2 hours after meals  (less than 180)     Try walking or doing some sort of physical activity at least 30 minutes 3 times a week to increase your sensitivity to insulin, improve sugar levels and hopefully this will help in trying to reduce the doses of your medications in the future.     If having low blood sugar < 70 please correct with 16 grams of carbohydrates or with 4 glucose tablets and re-check your sugars every 15 minutes until symptoms resolve and sugar is above 100.      Ophthalmology appointment once a year.       If any questions feel free to contact me.     Have a nice day.     Sincerely,       Roman Mullins MD   Endocrinology   9/12/2022 2:41 PM                                 Eating the Right Number of Calories (1045-6492 Guidelines)    Calories are a measure of the energy you get from  food. If you eat more calories than you use, you will gain weight. If you eat fewer calories than you use, you will lose weight. Below are tables that give the number of calories needed each day. Look for your gender, age, and activity level. If you stick to this number, you should neither gain nor lose weight. Note that this is an estimated number of calories.* Your exact number may differ.    Women  Age in years Low activity level (calories/day) Moderate activity level (calories/day) High activity level (calories/day)   19 to 30 1,800-2,000 2,000-2,200 2,400   31 to 50 1,800 2,000 2,200   51 and older 1,600 1,800 2,000-2,200      Men  Age in years Low activity level  (calories/day) Moderate activity level (calories/day) High activity level (calories/day)   19 to 30 2,400-2,600 2,600-2,800 3,000   31 to 50 2,200-2,400 2,400-2,600 2,800-3,000   51 and older 2,000-2,200 2,200-2,400 2,400-2,800     Activity levels defined  Low. Only light physical activity such as that done during typical daily life.  Moderate. Light physical activity done during typical daily life AND physical activity equal to walking about 1.5 to 3 miles a day at 3 to 4 miles per hour.  High. Light physical activity done during typical daily life AND physical activity equal to walking more than 3 miles a day at 3 to 4 miles per hour.  *From Dietary Guidelines for Americans, 4683-9294, [place] Department of Health and Human Services.    Eat less fat  A gram of fat has almost 2.5 times the calories of a gram of protein or carbohydrates. Try to balance your food choices so that only 20% to 35% of your calories comes from total fat. This means an average of 2½ to 3½ grams of fat for each 100 calories you eat.    Eat more fiber  High-fiber foods are digested more slowly than low-fiber foods, so you feel full longer. Try to get at least 25 grams of fiber each day for a 2000 calorie diet. Foods high in fiber include:  Vegetables and fruits  Whole-grain or  bran breads, pastas, and cereals  Legumes (beans) and peas  As you begin to eat more fiber, be sure to drink plenty of water to keep your digestive system working smoothly.    Tips  Do's and don'ts include:   Dont skip meals. This often leads to overeating later on. Its best to spread your eating throughout the day.  Eat a variety of foods, not just a few favorites.  If you find yourself eating when youre not hungry, ask yourself why. Many of us eat when were bored, stressed, or just to be polite. Listen to your body. If youre not hungry, get busy doing something else instead of eating.  Eat slower, shooting for 20 to 30 minutes for each meal. It takes 20 minutes for your stomach to tell your brain that its full. Slow eaters tend to eat less and are still satisfied, while fast eaters may tend to be overeaters.   Pay attention to what you eat. Dont read or watch TV during your meal.     ---------------------------------------------------------------------------------------------------------------------------------------------------    Losing Weight for Heart Health  Excess weight is a major risk factor for heart disease. Losing weight has many benefits including lowering your blood pressure, improving your cholesterol level, and decreasing your risk for diseases such as diabetes and heart disease. It may help keep your arteries open so that your heart can get the oxygen-rich blood it needs. All in all, losing weight makes you healthier.       Exercise with a friend. When activity is fun, you're more likely to stick with it.     Calories and weight loss  Calories are the fuel your body burns for energy. You get the calories you need from the food you eat. For healthy weight loss, women should eat at least 1,200 calories a day, men at least 1,500.  When you eat more calories than you need, your body stores the extra calories as fat. One pound of fat equals 3,500 calories.  To lose weight, try to reduce your  total calorie intake by 500 calories. To do this, eat 250 calories less each day. Add activity to burn the other 250 calories. Walking 2.5 miles burns about 250 calories. Other more intense activities can burn more calories in the time you spend doing them, such as swimming and running. It is important to understand that reducing calorie intake is much more effective at weight loss than is exercise.  Eat a variety of healthy foods to get the nutrients you need.    Tips for losing weight  Drink 8 to 10 glasses of water a day.  Dont skip meals. Instead, eat smaller portions.  Eat your meals earlier in the day.  Cut out sugary drinks such as soda and fruit juices.  Make your later meals lighter than your earlier meals.     What can exercise help?  Blood sugar. Regular exercise improves blood sugar control by helping your body use insulin.  Mental and emotional health. Physical activity relieves stress and helps you sleep better.  Heart health. With regular exercise, you can reduce your risk of heart disease and high blood pressure. You can also improve your cholesterol and triglyceride levels.  Weight. Exercise helps you lose fat, gain muscle, and control your weight.  Health of blood vessels and nerves. Activity helps lower blood sugar. This helps prevent damage to blood vessels and nerves that can cause problems with your brain, eyes, feet, and legs.  Finances. If you manage your blood sugar, you may spend less on medical care.    2 types of exercise  Two types of exercise help your body use blood sugar. Experts advise both types of exercise for people with diabetes:  Aerobic exercise. This is a rhythmic, repeated, continued movement of large muscle groups for at least 10 minutes at a time. You should do this about 30 minutes a day on most days of the week. Examples include walking, bicycling, jogging, swimming, water aerobics, and many sports.  Resistance exercise (strength training). This type of exercise uses  muscles to move weight or work against resistance. You can do it with free weights, machines, resistance tubing, or your own body weight. Adults with diabetes should aim for 2 to 3 sessions of resistance exercise each week. Its best to skip a day in between.    A goal to shoot for  Your main goal is to become more active. Even a little bit helps. Choose an activity that you like. Walking is one great form of exercise that everyone can do. Talk to your healthcare provider about any limits you may have before starting with an exercise program. Then aim for 150 minutes a week of physical activity. Dont let more than 2 days go by without exercise. When you are sitting for long periods of time, get up for short sessions of light activity every 30 minutes.    Getting activity into your day  Being more active doesnt have to be hard work. Try these to get more activity into your day:  Take the stairs instead of the elevator  Garden, do housework, and yard work  Choose a parking space farther from the store  Walk to talk to a co-worker instead of calling  Take a 10-minute walk around the block at lunch  Walk to a bus stop a little farther from your home or office  Walk the dog after dinner     ---------------------------------------------------------------------------------------------------------------------------------------------------    Reading Food Labels  Look for the Nutrition Facts label on packaged foods. Reading labels is a big step toward eating healthier. The tips below help you know what to look for.    Serving size. Read this closely because the package, jar, or can may contain more than 1 serving. This is how to measure 1 serving of the food in the package. If you eat more than 1 serving, you get more of everything on the label -- including fat, cholesterol, and calories.  Total fat. This tells you how many grams (g) of fat are in 1 serving. Fat is high in calories. A healthy goal is to have less than  25% of your daily calories come from fat.  Saturated fat. This tells you how much saturated fat is in 1 serving. Saturated fat raises your cholesterol the most. Look for foods that have little or no saturated fat.  Trans fat. This tells you how much trans fat is in 1 serving. Even a small amount of trans fat can harm your health. Choose foods that have no trans fat.  Cholesterol. This tells you how much cholesterol is in 1 serving. For many years, it had been recommended to eat less than 300 milligrams (mg) of cholesterol a day. New guidelines have removed this limitation as cholesterol has been recently shown to not raise blood cholesterol levels as significantly as previously thought. However, many foods high in cholesterol are also high in saturated fat. It is recommended to limit saturated fat in your diet.  Calories from fat. This number tells you how many calories from fat are in 1 serving (there are 9 calories per gram of fat). Look for foods with few calories from fat.  % Daily value. The higher the number, the more 1 serving has of that nutrient. Look for foods that have low numbers for total fat, saturated fat, cholesterol, and sodium.  Sodium. This tells you how much sodium (salt) is in 1 serving. Choose foods with low numbers for sodium.  Dietary fiber. This number tells you how much fiber is in 1 serving. Foods that are high in fiber can help you feel full. They can also be good for your heart and digestion. The recommended daily amount of fiber is 25 grams for women and 38 grams for men. After age 50, your daily fiber needs drop to 21 grams for women and 30 grams for men.       ---------------------------------------------------------------------------------------------------------------------------------------------------    Understanding Carbohydrates, Fats, and Protein  Food is a source of fuel and nourishment for your body. Its also a source of pleasure. Having diabetes doesnt mean you have to  eat special foods or give up desserts. Instead, your dietitian can show you how to plan meals to suit your body. To start, learn how different foods affect blood sugar.    Carbohydrates  Carbohydrates are the main source of fuel for the body. Carbohydrates raise blood sugar. Many people think carbohydrates are only found in pasta or bread. But carbohydrates are actually in many kinds of foods:  Sugars occur naturally in foods such as fruit, milk, honey, and molasses. Sugars can also be added to many foods, from cereals and yogurt to candy and desserts. Sugars raise blood sugar.  Starches are found in bread, cereals, pasta, and dried beans. Theyre also found in corn, peas, potatoes, yam, acorn squash, and butternut squash. Starches also raise blood sugar.   Fiber is found in foods such as vegetables, fruits, beans, and whole grains. Unlike other carbs, fiber isnt digested or absorbed. So it doesnt raise blood sugar. In fact, fiber can help keep blood sugar from rising too fast. It also helps keep blood cholesterol at a healthy level.  Did you know?  Even though carbohydrates raise blood sugar, its best to have some in every meal. They are an important part of a healthy diet.     Fat  Fat is an energy source that can be stored until needed. Fat does not raise blood sugar. However, it can raise blood cholesterol, increasing the risk of heart disease. Fat is also high in calories, which can cause weight gain. Not all types of fat are the same.    More Healthy:  Monounsaturated fats are mostly found in vegetable oils, such as olive, canola, and peanut oils. They are also found in avocados and some nuts. Monounsaturated fats are healthy for your heart. Thats because they lower LDL (unhealthy) cholesterol.  Polyunsaturated fats are mostly found in vegetable oils, such as corn, safflower, and soybean oils. They are also found in some seeds, nuts, and fish. Polyunsaturated fats lower LDL (unhealthy) cholesterol. So,  choosing them instead of saturated fats is healthy for your heart. Certain unsaturated fats can help lower triglycerides.     Less Healthy:  Saturated fats are found in animal products, such as meat, poultry, whole milk, lard, and butter. Saturated fats raise LDL cholesterol and are not healthy for your heart.  Hydrogenated oils and trans fats are formed when vegetable oils are processed into solid fats. They are found in many processed foods. Hydrogenated oils and trans fats raise LDL cholesterol and lower HDL (healthy) cholesterol. They are not healthy for your heart.    Protein  Protein helps the body build and repair muscle and other tissue. Protein has little or no effect on blood sugar. However, many foods that contain protein also contain saturated fat. By choosing low-fat protein sources, you can get the benefits of protein without the extra fat:  Plant protein is found in dry beans and peas, nuts, and soy products, such as tofu and soymilk. These sources tend to be cholesterol-free and low in saturated fat.  Animal protein is found in fish, poultry, meat, cheese, milk, and eggs. These contain cholesterol and can be high in saturated fat. Aim for lean, lower-fat choices.    ---------------------------------------------------------------------------------------------------------------------------------------------------    Understanding Carbohydrates    A car needs the right type of fuel to run. And you need the right kind of food to function. To keep your energy level up, your body needs food that has carbohydrates. But carbohydrates raise blood sugar levels higher and faster than other kinds of food. Your dietitian will work with you to figure out the amount of carbohydrates you need.    Starches  Starches are found in grains, some vegetables, and beans. Grain products include bread, pasta, cereal, and tortillas. Starchy vegetables include potatoes, peas, corn, lima beans, yams, and squash. Kidney beans,  vo beans, black beans, garbanzo beans, and lentils also contain starches.    Sugars  Sugars are found naturally in many foods. Or sugar can be added. Foods that contain natural sugar include fruits and fruit juices, dairy products, honey, and molasses. Added sugars are found in most desserts, processed foods, candy, regular soda, and fruit drinks. These are very helpful for treating low blood sugar, or hypoglycemia. They provide sugar quickly. Try to keep at least 15 to 20 grams of these simple sugars with you at all times. Eat this if you begin to have low blood sugar symptoms.    Fiber  Fiber comes from plant foods. Most fiber isnt digested by the body. Instead of raising blood sugar levels like other carbohydrates, it actually stops blood sugar from rising too fast. Fiber is found in fruits, vegetables, whole grains, beans, peas, and many nuts.    Carb counting  Keep track of the amount of carbohydrates you eat. This can help you keep the right balance of physical activity and medicine. The amount of carbohydrates needed will vary for each person. It depends on many things such as your health, the medicines you take, and how active you are. Your healthcare team will help you figure out the right amount of carbohydrates for you. You may start with around 45 to 60 grams of carbohydrate per meal, depending on your situation. Carb counting is a system that helps you keep track of the carbohydrates you eat at each meal.  Carbohydrates come from a variety of foods. These include grains, starchy vegetables, fruit, milk, beans, and snack foods. You can either count carbohydrate grams or carbohydrate servings. When you count carbohydrate servings, 1 carbohydrate serving = 15 grams of carbohydrates.  Here are some examples of foods containing about 15 grams of carbohydrates (1 serving of carbohydrates):  1/2 cup of canned or frozen fruit  A small piece of fresh fruit (4 ounces)  1 slice of bread  1/2 cup of  oatmeal  1/3 cup of rice  4 to 6 crackers  1/2 English muffin  1/2 cup of black beans  1/4 of a large baked potato (3 ounces)  2/3 cup of plain fat-free yogurt  1 cup of soup  1/2 cup of casserole  6 chicken nuggets  2-inch-square brownie or cake without frosting  2 small cookies  1/2 cup of ice cream or sherbet    Carb counting is easier when food labels are available. Look at the label to see how many grams of total carbohydrates the food contains. Then you can figure out how much you should eat.  Two very important lines to look at on the label are the serving size and the total carbohydrate amount. Here are some tips for using food labels to count your carbohydrate intake:  Check the serving size. The information on the label is based on that serving size. If you eat more than the listed serving size, you may have to double or triple the other information on the label.   Check the total grams of carbohydrates. Total carbohydrate from the label includes sugar, starch, and fiber. Be sure to use the total carbohydrate number and not sugar alone.  Know how many grams of carbohydrates you can have.  Be familiar with the matching portion sizes.  Compare labels. Compare the labels of different products, looking at serving sizes and total carbohydrates to find the products that work best for you.   Don't forget protein and fat. With all the focus on carb counting, it might be easy to forget protein and fat in your meals. Don't forget to include sources of protein and healthy fat to balance your meals.  Its also important to be consistent with the amount and time you eat when taking a fixed dose of diabetes medicine. Work with your healthcare provider or dietitian if you need additional help. He or she can help you keep track of your carbohydrate intake. He or she can also help you figure out how many grams of carbohydrates you should  have.      ---------------------------------------------------------------------------------------------------------------------------------------------------    Diabetes: Learning About Serving and Portion Sizes     A good rule of thumb: Devote half your plate to vegetables and green salad. Split the other half between protein and starchy carbohydrates. Fruit makes a good dessert.     Servings and portions. Whats the difference? These terms can be very confusing. But learning to measure serving sizes can help you figure out how many carbohydrates (carbs) and other foods you eat each day. They are also powerful tools for managing your weight.    Servings and portions  Many different words are used to describe amounts of food. If your health care provider uses a term youre not sure of, dont be afraid to ask. It helps to know the difference between servings and portions:  A serving size is a fixed size. Food producers use this term to describe their products. For example, the label on a cereal box could say that 1 cup of dry cereal = 1 serving.  A portion (also called a helping) is how much you eat or how much you put on your plate at a meal. For example, you might eat 2 cups of cereal at breakfast.    Using serving information  The portion you choose to eat (such as 2 cups of cereal) may be more than one serving as listed on the food label (such as 1 cup of cereal). Thats why it helps to measure or weigh the food you eat. Because the food label values are based on servings, youll need to know how many servings you eat at one sitting.     Ounces: 2 to 3 ounces is about the size of your palm.       1 Cup: 1 cup (or a medium-sized piece) is about the size of your fist.       1/2 Cup: 1/2 cup is about the size of your cupped hand.      One tablespoon is about the size of your thumb.  One teaspoon is about the size of the tip of your thumb.    Keeping track of serving sizes  When youre planning for a snack or a  "meal, keep servings in mind. If you dont have measuring cups or a scale handy, there are ways to eyeball serving sizes, such as comparing your food to the size of your hand (see pictures above).    Managing portion sizes  If your weight is a concern, reducing your portions can help. You can eat more than one serving of a food at once. But to keep from eating too much at one meal, learn how to manage your portions. A portion is the amount of each type of food on your plate. See the plate diagram for an example of balanced portions.    ---------------------------------------------------------------------------------------------------------------------------------------------------    Diabetes: Shopping for and Preparing Meals    Having diabetes doesnt mean you have to shop in a special aisle or look for special foods. But you will need to make choices. By comparing items and reading food labels, you can find the healthiest foods for you and your family.  Comparing items  When you shop, compare items to find the best ones for your needs. Keep these facts in mind:  No sugar added does not mean a product is sugar-free.  "Sugar-free" means less than 1/2 gram (g) of sugar per serving.  Fat free means less than 1/2 g of fat per serving. This does not necessarily mean the product is low in calories.  Low fat means 3 g fat or less per serving. Reduced fat or less fat means 25% less fat than the regular version. Some of this fat may be saturated or trans fat. And calories per serving may be similar to the regular version.    Making small changes  Dont try to change all of your eating habits at once. Here are some ideas to start with:  Try fat-free or low-fat cheese, milk, and yogurt. Also try leaner cuts of meat. This will help you cut down on saturated fat.  Try whole-grain breads, brown rice, and whole-wheat pasta.  Load up on fresh or frozen vegetables. If you buy canned, choose low-sodium vegetables.  Avoid " processed foods as much as possible. They tend to be low in fiber and high in trans fats and sodium.  Try tofu, soymilk, or meat substitutes.?They can help you cut cholesterol and saturated fat out of your diet.    Learning to read food labels  To find healthy foods that help you control blood sugar, learn how to read food labels. Look for the Nutrition Facts label on packaged foods. It will tell you how much carbohydrate, sugar, fat, and fiber is in each serving. Then, you can decide whether or not the food fits into your meal plan.    Using the food label  So, once you have the food label, what do you do with it? The food label helps in many ways. Use it to:  Compare items and decide which is the best for your health needs.  Track the number of carbohydrates in your portions.  Figure out how many servings of a food you can have and still stay within the number of carbohydrates for that meal.    Planning meals  For good blood sugar control, plan what and when youll eat. Start by creating a meal plan that includes all the food groups. Then, time your meals to help keep your blood sugar level steady. You may need to adjust your plan for special situations.    Eat from all the food groups  The basis of a healthy meal plan is variety (eating many different types of foods). Look for lean meats, fresh fruits and vegetables, whole grains, and low-fat or non-fat dairy products. Eating a wide variety of foods provides the nutrients your body needs. It can also keep you from getting bored with your meal plan.    Reduce liquid sugars  Extra calories from sodas, sports drinks, and fruit drinks make it hard to keep blood sugar in range. Cut as many liquid sugars from your meal plan as you can. This includes most fruit juices, which are often high in natural or added sugar. Instead, drink plenty of water and other sugar-free beverages.    Eat less fat  If you need to lose some weight, try to reduce the amount of fat in your  diet. This can also help lower your cholesterol level to keep blood vessels healthier. Cut fat by using only small amounts of liquid oil for cooking. Read food labels carefully to avoid foods with unhealthy trans fats.    Timing your meals  When it comes to blood sugar control, when you eat is as important as what you eat. You may need to eat several small meals spaced evenly throughout the day to stay in your target range. So dont skip breakfast or wait until late in the day to get most of your calories. Doing so can cause your blood sugar to rise too high or fall too low.    Cooking wisely  Broil, steam, bake, or grill meats and vegetables, instead of frying.  Instead of cream-based sauces or sugary glazes, flavor foods with vegetable purée, lemon or lime juice, or herb seasonings.  Remove skin from chicken and turkey before serving.  Look in cookbooks for easy, low-fat, low-sugar recipes. When making your usual recipes, cut sugar by 1/2 and fat by 1/3.      ---------------------------------------------------------------------------------------------------------------------------------------------------    Healthy Meals for Diabetes    Ask your healthcare team to help you make a meal plan that fits your needs. Your meal plan tells you when to eat your meals and snacks, what kinds of foods to eat, and how much of each food to eat. You dont have to give up all the foods you like. But you do need to follow some guidelines.  Choose healthy carbohydrates  Starches, sugars, and fiber are all types of carbohydrates. Fiber can help lower your cholesterol and triglycerides. Fiber is also healthy for your heart. You should have 20 to 35 grams of total fiber each day. Fiber-rich foods include:  Whole-grain breads and cereals  Bulgur wheat  Brown rice     Whole-wheat pasta  Fruits and vegetables  Dry beans, and peas   Keep track of the amount of carbohydrates you eat. This can help you keep the right balance of physical  activity and medicine. The amount of carbohydrates needed will vary for each person. It depends on many things such as your health, the medicines you take, and how active you are. Your healthcare team will help you figure out the right amount of carbohydrates for you. You may start with around 45 to 60 grams of carbohydrates per meal, depending on your situation.   Here are some examples of foods containing about 15 grams of carbohydrates (1 serving of carbohydrates):  1/2 cup of canned or frozen fruit  A small piece of fresh fruit (4 ounces)  1 slice of bread  1/2 cup of oatmeal  1/3 cup of rice  4 to 6 crackers  1/2 English muffin  1/2 cup of black beans  1/4 of a large baked potato (3 ounces)  2/3 cup of plain fat-free yogurt  1 cup of soup  1/2 cup of casserole  6 chicken nuggets  2-inch-square brownie or cake without frosting  2 small cookies  1/2 cup of ice cream or sherbet    Choose healthy protein foods  Eating protein that is low in fat can help you control your weight. It also helps keep your heart healthy. Low-fat protein foods include:  Fish  Plant proteins, such as dry beans and peas, nuts, and soy products like tofu and soymilk  Lean meat with all visible fat removed  Poultry with the skin removed  Low-fat or nonfat milk, cheese, and yogurt    Limit unhealthy fats and sugar  Saturated and trans fats are unhealthy for your heart. They raise LDL (bad) cholesterol. Fat is also high in calories, so it can make you gain weight. To cut down on unhealthy fats and sugar, limit these foods:  Butter or margarine  Palm and palm kernel oils and coconut oil  Cream  Cheese  Zelaya  Lunch meats     Ice cream  Sweet bakery goods such as pies, muffins, and donuts  Jams and jellies  Candy bars  Regular sodas     How much to eat  The amount of food you eat affects your blood sugar. It also affects your weight. Your healthcare team will tell you how much of each type of food you should eat.  Use measuring cups and spoons  and a food scale to measure serving sizes.  Learn what a correct serving size looks like on your plate. This will help when you are away from home and cant measure your servings.  Eat only the number of servings given on your meal plan for each food. Dont take seconds.  Learn to read food labels. Be sure to look at serving size, total carbohydrates, fiber, calories, sugar, and saturated and trans fats. Look for healthier alternatives to foods that have added sugar.  Plan ahead for parties so you can still have a good time without going overboard with unhealthy food choices. Set a good example yourself by bringing a healthy dish to pot lucks.     Choose healthy snacks  When it comes to snacks, we usually think about foods with added sugar and fats. But there are many other options for healthier snack choices. Here are a few snack ideas to choose from:  Snacks with less than 5 grams of carbohydrates  1 piece of string cheese  3 celery sticks plus 1 tablespoon of peanut butter  5 cherry tomatoes plus 1 tablespoon of ranch dressing  1 hard-boiled egg  1/4 cup of fresh blueberries   5 baby carrots  1 cup of light popcorn  1/2 cup of sugar-free gelatin  15 almonds  Snacks with about 10 to 20 grams of carbohydrates  1/3 cup of hummus plus 1 cup of fresh cut nonstarchy vegetables (carrots, green peppers, broccoli, celery, or a combination)  1/2 cup of fresh or canned fruit plus 1/4 cup of cottage cheese  1/2 cup of tuna salad with 4 crackers  2 rice cakes and a tablespoon of peanut butter  1 small apple or orange  3 cups light popcorn  1/2 of a turkey sandwich (1 slice of whole-wheat bread, 2 ounces of turkey, and mustard)  Portion sizes are important to controlling your blood sugar and staying at a healthy weight. Stock up on healthy snack items so you always have them on hand.    When to eat  Your meal plan will likely include breakfast, lunch, dinner, and some snacks.  Try to eat your meals and snacks at about the same  times each day.  Eat all your meals and snacks. Skipping a meal or snack can make your blood sugar drop too low. It can also cause you to eat too much at the next meal or snack. Then your blood sugar could get too high.    ---------------------------------------------------------------------------------------------------------------------------------------------------    Eating Out When You Have Diabetes  Eating right is an important part of keeping your blood sugar in your target range. You just need to make healthy choices.    Be creative when eating out. Many places dont make you stick strictly to the menu. Instead of ordering a large entree, choose an appetizer or two and a bowl of soup. A mix of side orders can also make a good meal. Read the descriptions of other entrees and specials. If another entree comes with baby carrots, ask for a side order of them even if they dont come with your entree. Ask how food is prepared or if it can be made differently.  Tips for making the most of your meal out  Ask to have high-fat, high-calorie extras, such as French fries and potato chips, left off your plate so you wont be tempted.   Ask what substitutions are available. Instead of French fries, you may be able to have a side of salad with low-calorie dressing.  Order low-fat milk instead of cream for your coffee.  Ask for vegetables and main courses to be served without sauces, butter, margarine, or oil.  Be aware of portion size. You dont have to clean your plate. Take half your meal home in a doggie bag to eat the next day.  Look for heart-healthy or low-fat entrees that include whole grains, vegetables, or fruits.  Choose foods that are grilled, broiled, or steamed.  Avoid dishes that are described on the menu as fried, breaded, smothered, rich, or creamy.    Tips for restaurant meals  When you eat away from home try these tips:  Try to schedule your dining-out meal at your normal meal time. Make a reservation if  possible, so you don't have to wait to eat. If you can't make a reservation, try to arrive at the restaurant at a less-busy time to cut down your wait time. Eat a small fruit or starch snack at your regular mealtime if your restaurant meal is going to be later than usual.   Call ahead to see if the restaurant can meet your dietary needs if you've never been there before. Or you can go online to see the menu ahead of time.  Carry some crackers with you in case the restaurant needs you to wait until you can be served.  Ask how foods are prepared before you order.  Instead of fried, sautéed, or breaded foods, choose ones that are broiled, steamed, grilled, or baked.  Ask for sauces, gravies, and dressings on the side.  Only eat an amount that fits your meal plan. Remember: You can take home the leftovers.  Save dessert for special occasions. Then choose a small dessert or share one with a friend or family member.    Make healthy choices  Fast food  Garden salad with light dressing on the side  Baked potato with vegetables or herbs  Broiled, roasted, or grilled chicken sandwich  Sliced turkey or lean roast beef sandwich    Mexican  Chicken enchilada, without cheese or sour cream   Small burrito with whole beans and chicken  Whole beans (not refried) and rice  Chicken or fish fajitas    Steakhouse  Grilled or broiled lean cuts of beef  Baked potato with vegetables or herbs  Broiled or baked chicken. Dont eat the skin.  Steamed vegetables    Asian  Steamed dumplings or potstickers  Broiled, boiled, or steamed meats or fish  Sushi or sashimi  Steamed rice or boiled noodles. One serving is equal to 1/3 cup.      © [id number] [employer]. [address] [place] [address], [place] [address]. All rights reserved. This information is not intended as a substitute for professional medical care. Always follow your healthcare professional's instructions.

## 2022-09-12 NOTE — PROGRESS NOTES
Subjective:      Patient ID: Filiberto Cohn is a 52 y.o. male.    Chief Complaint:  DM2; Initial visit     Patient Active Problem List   Diagnosis    Screening for prostate cancer    History of long-term treatment with high-risk medication    Type 2 diabetes mellitus    ADD (attention deficit disorder)    peripheral vision, blinking light    Mixed hyperlipidemia    Hyperglycemia    Essential hypertension    Trigger middle finger of left hand        History of Present Illness  53 YO Male w/ PMH as above that presents to the endocrine clinic for initial evaluation of DM2.  Pt today reports he has been having hyperglycemia that has been worsening for the past 6 months.  Reports feeling nausea since starting Actos some months ago.  Denies nausea to Trulicity.  Pt reports glucose numbers are persistently > 200.   Denies hypoglycemias.  Denies changes in diet.  Denies abdominal pain or vomit.            With regards to Diabetes Mellitus:     -Type 2     -Duration:  Dx  10 yrs   -Microvascular complications: DENIES   -Macrovascular complications: DENIES   -FH:  Father and sisters     -DKA?  DENIES   -HHS?  DENIES     -DM Medications:   Metformin 1g BID,  Trulicity 4.5 mg SC weekly,  Actos 15mg (2 months ago)       -Compliance w/ Meds:  Compliant     -Previously tried medications:  Invokanna, Jardiance   (Stopped 6 months ago)  per patient not working.  Did help him in loosing weight and better glycemic control per patient.     -Glucose monitoring:  AM (212 - 268)  Above goal.       -Hyperglycemia symptoms:   DENIES   -Hypoglycemia symptoms:  DENIES       -Steroids?  DENIES     -Diet:    Breakfast:   Cheerios,  Stevia, Coffee    Lunch:  [place],  Coke zero,  Cheese, chips    Diner:  Protein, veggie,  Rice, Trinity,  Potatoes    Snacks:  Chips,  Pretzels,    Soft drinks:   DENIES     -Activity:   Sedentary      -Pancreatitis?    DENIES   -UTIs?  DENIES   -Thyroid CA?   DENIES       Diabetes Management Status    Statin:  Taking  ACE/ARB: Taking    Screening or Prevention Patient's value Goal Complete/Controlled?   HgA1C Testing and Control   Lab Results   Component Value Date    HGBA1C 10.8 (H) 04/04/2022      Annually/Less than 8% No     Lipid profile : 12/06/2021 Annually Yes     LDL control Lab Results   Component Value Date    LDLCALC 73.8 12/06/2021    LDLCALC 73.8 12/06/2021    Annually/Less than 100 mg/dl  Yes     Nephropathy screening Lab Results   Component Value Date    LABMICR 34.0 03/13/2015     Lab Results   Component Value Date    PROTEINUA Negative 03/12/2015    Annually Yes     Blood pressure BP Readings from Last 1 Encounters:   08/04/22 121/80    Less than 140/90 Yes     Dilated retinal exam : 02/21/2022 Annually Yes     Foot exam   : 12/20/2021 Annually Yes           ROS:   As above    Objective:     There were no vitals taken for this visit.  BP Readings from Last 3 Encounters:   08/04/22 121/80   04/04/22 (!) 140/80   03/29/22 127/71     Wt Readings from Last 1 Encounters:   08/04/22 1529 83.5 kg (184 lb 1.4 oz)     There is no height or weight on file to calculate BMI.      Physical Exam  Vitals reviewed.   Constitutional:       General: He is not in acute distress.     Appearance: Normal appearance. He is well-developed. He is not ill-appearing.   HENT:      Nose: Nose normal. No rhinorrhea.      Mouth/Throat:      Mouth: Mucous membranes are moist.   Eyes:      Extraocular Movements: Extraocular movements intact.      Pupils: Pupils are equal, round, and reactive to light.      Comments: No proptosis, No lid lag, No conjunctival erythema    Neck:      Thyroid: No thyromegaly.      Trachea: No tracheal deviation.      Comments: No thyromegaly or Thyroid nodules palpated on exam   Cardiovascular:      Rate and Rhythm: Normal rate and regular rhythm.      Pulses: Normal pulses.   Pulmonary:      Effort: Pulmonary effort is normal.      Breath sounds: Normal breath sounds.   Abdominal:      Palpations: Abdomen is  soft. There is no mass.      Tenderness: There is no abdominal tenderness.      Hernia: No hernia is present.      Comments: No scar tissue, No Violaceous striae    Musculoskeletal:         General: No swelling.      Cervical back: Neck supple. No tenderness.      Right lower leg: No edema.      Left lower leg: No edema.   Skin:     General: Skin is warm.      Findings: No rash.   Neurological:      General: No focal deficit present.      Mental Status: He is alert and oriented to person, place, and time.   Psychiatric:         Mood and Affect: Mood normal.         Judgment: Judgment normal.          Protective Sensation (w/ 10 gram monofilament):  Right: Intact  Left: Intact    Visual Inspection:  Normal -  Bilateral    Pedal Pulses:   Right: Present  Left: Present    Posterior tibialis:   Right:Present  Left: Present     Lab Review:   Lab Results   Component Value Date    HGBA1C 10.8 (H) 04/04/2022     Lab Results   Component Value Date    CHOL 126 12/06/2021    CHOL 126 12/06/2021    HDL 42 12/06/2021    HDL 42 12/06/2021    LDLCALC 73.8 12/06/2021    LDLCALC 73.8 12/06/2021    TRIG 51 12/06/2021    TRIG 51 12/06/2021    CHOLHDL 33.3 12/06/2021    CHOLHDL 33.3 12/06/2021     Lab Results   Component Value Date     12/06/2021     12/06/2021    K 3.9 12/06/2021    K 3.9 12/06/2021     12/06/2021     12/06/2021    CO2 24 12/06/2021    CO2 24 12/06/2021     (H) 12/06/2021     (H) 12/06/2021    BUN 16 12/06/2021    BUN 16 12/06/2021    CREATININE 0.8 12/06/2021    CREATININE 0.8 12/06/2021    CALCIUM 9.3 12/06/2021    CALCIUM 9.3 12/06/2021    PROT 7.9 12/06/2021    PROT 7.9 12/06/2021    ALBUMIN 4.1 12/06/2021    ALBUMIN 4.1 12/06/2021    BILITOT 0.3 12/06/2021    BILITOT 0.3 12/06/2021    ALKPHOS 52 (L) 12/06/2021    ALKPHOS 52 (L) 12/06/2021    AST 18 12/06/2021    AST 18 12/06/2021    ALT 20 12/06/2021    ALT 20 12/06/2021    ANIONGAP 11 12/06/2021    ANIONGAP 11 12/06/2021     ESTGFRAFRICA >60.0 12/06/2021    ESTGFRAFRICA >60.0 12/06/2021    EGFRNONAA >60.0 12/06/2021    EGFRNONAA >60.0 12/06/2021    TSH 1.270 05/17/2019     Vit D, 25-Hydroxy   Date Value Ref Range Status   08/28/2020 36 30 - 96 ng/mL Final     Comment:     Vitamin D deficiency.........<10 ng/mL                              Vitamin D insufficiency......10-29 ng/mL       Vitamin D sufficiency........> or equal to 30 ng/mL  Vitamin D toxicity............>100 ng/mL         Assessment and Plan     Uncontrolled type 2 diabetes mellitus with hyperglycemia  Lab Results   Component Value Date    HGBA1C 10.8 (H) 04/04/2022        -FS log  ABOVE GOAL   -Diet, exercise, lifestyle modifications and A1c Goals discussed   -Hypoglycemia symptoms and plan of action discussed   -Low carbohydrate diet < 150g daily  -Seen DM Education?      PLAN:     Due to A1c and glucose log above goal and patient reporting nausea to Actos. I will recommend the following.     Will send C peptide and NIKKO Ab to rule out MAX      DC Actos due to nausea per patient     Start Glimepiride 4mg with breakfast     Start Jardiance 10mg daily (Side effects discussed)     C/w Trulicity 4.5 mg SC weekly  (For NOW, If Nausea does not improve, will consider decreasing dose)      C/w Metformin 1 g BID     DM education     Send glucose logs for review     Will consider Basal insulin at next visit if no improvement of hyperglycemia and A1C     Will monitor      Hyperlipidemia, unspecified hyperlipidemia type  LDL at goal On Statin   Low Fat diet      Hypertension, essential     BP controlled on current BP meds   On ARB for renal protection   Low Na diet

## 2022-09-15 ENCOUNTER — LAB VISIT (OUTPATIENT)
Dept: LAB | Facility: HOSPITAL | Age: 52
End: 2022-09-15
Attending: GENERAL ACUTE CARE HOSPITAL
Payer: COMMERCIAL

## 2022-09-15 DIAGNOSIS — E11.65 UNCONTROLLED TYPE 2 DIABETES MELLITUS WITH HYPERGLYCEMIA: ICD-10-CM

## 2022-09-15 LAB
ALBUMIN SERPL BCP-MCNC: 4.3 G/DL (ref 3.5–5.2)
ANION GAP SERPL CALC-SCNC: 7 MMOL/L (ref 8–16)
BUN SERPL-MCNC: 16 MG/DL (ref 6–20)
C PEPTIDE SERPL-MCNC: 1.77 NG/ML (ref 0.78–5.19)
CALCIUM SERPL-MCNC: 9.8 MG/DL (ref 8.7–10.5)
CHLORIDE SERPL-SCNC: 102 MMOL/L (ref 95–110)
CHOLEST SERPL-MCNC: 137 MG/DL (ref 120–199)
CHOLEST/HDLC SERPL: 3 {RATIO} (ref 2–5)
CO2 SERPL-SCNC: 29 MMOL/L (ref 23–29)
CREAT SERPL-MCNC: 0.8 MG/DL (ref 0.5–1.4)
EST. GFR  (NO RACE VARIABLE): >60 ML/MIN/1.73 M^2
ESTIMATED AVG GLUCOSE: 246 MG/DL (ref 68–131)
GLUCOSE SERPL-MCNC: 171 MG/DL (ref 70–110)
HBA1C MFR BLD: 10.2 % (ref 4–5.6)
HDLC SERPL-MCNC: 46 MG/DL (ref 40–75)
HDLC SERPL: 33.6 % (ref 20–50)
LDLC SERPL CALC-MCNC: 81.4 MG/DL (ref 63–159)
NONHDLC SERPL-MCNC: 91 MG/DL
PHOSPHATE SERPL-MCNC: 3.5 MG/DL (ref 2.7–4.5)
POTASSIUM SERPL-SCNC: 4.1 MMOL/L (ref 3.5–5.1)
SODIUM SERPL-SCNC: 138 MMOL/L (ref 136–145)
TRIGL SERPL-MCNC: 48 MG/DL (ref 30–150)

## 2022-09-15 PROCEDURE — 80069 RENAL FUNCTION PANEL: CPT | Performed by: GENERAL ACUTE CARE HOSPITAL

## 2022-09-15 PROCEDURE — 80061 LIPID PANEL: CPT | Performed by: GENERAL ACUTE CARE HOSPITAL

## 2022-09-15 PROCEDURE — 83036 HEMOGLOBIN GLYCOSYLATED A1C: CPT | Performed by: GENERAL ACUTE CARE HOSPITAL

## 2022-09-15 PROCEDURE — 86341 ISLET CELL ANTIBODY: CPT | Performed by: GENERAL ACUTE CARE HOSPITAL

## 2022-09-15 PROCEDURE — 84681 ASSAY OF C-PEPTIDE: CPT | Performed by: GENERAL ACUTE CARE HOSPITAL

## 2022-09-20 ENCOUNTER — PATIENT MESSAGE (OUTPATIENT)
Dept: ENDOCRINOLOGY | Facility: CLINIC | Age: 52
End: 2022-09-20
Payer: COMMERCIAL

## 2022-09-22 LAB — GAD65 AB SER-SCNC: 9.12 NMOL/L

## 2022-09-26 ENCOUNTER — PATIENT MESSAGE (OUTPATIENT)
Dept: ENDOCRINOLOGY | Facility: CLINIC | Age: 52
End: 2022-09-26
Payer: COMMERCIAL

## 2022-09-26 NOTE — TELEPHONE ENCOUNTER
NIKKO Ab POSITIVE     C PEPTIDE is Still detectable.     MAX treated as type 2     Will continue with current regimen but STOP Jardiance and hold off on starting insulin since since glucose is at goal and C peptide still detectable     Will monitor C peptide q 6 months and consider insulin if evidence of pancreatic beta cell function loss in the future.

## 2022-10-12 ENCOUNTER — PATIENT MESSAGE (OUTPATIENT)
Dept: ENDOCRINOLOGY | Facility: CLINIC | Age: 52
End: 2022-10-12
Payer: COMMERCIAL

## 2022-10-16 ENCOUNTER — PATIENT MESSAGE (OUTPATIENT)
Dept: INTERNAL MEDICINE | Facility: CLINIC | Age: 52
End: 2022-10-16
Payer: COMMERCIAL

## 2022-10-17 NOTE — TELEPHONE ENCOUNTER
Pt states he started with sx on Saturday, tested positive for covid yesterday. Pt c/o fever up to 102, aches and pains, cough and congestion. Pt requesting meds. Will pend Paxlovid for your review. Pt is on Pravastatin.       Covid risk 3  eGFR 9/15/22 >60

## 2022-11-01 ENCOUNTER — PATIENT MESSAGE (OUTPATIENT)
Dept: ENDOCRINOLOGY | Facility: CLINIC | Age: 52
End: 2022-11-01
Payer: COMMERCIAL

## 2022-11-15 ENCOUNTER — PATIENT MESSAGE (OUTPATIENT)
Dept: ENDOCRINOLOGY | Facility: CLINIC | Age: 52
End: 2022-11-15
Payer: COMMERCIAL

## 2022-11-15 DIAGNOSIS — E13.9 LADA (LATENT AUTOIMMUNE DIABETES IN ADULTS), MANAGED AS TYPE 2: ICD-10-CM

## 2022-11-15 DIAGNOSIS — E11.65 UNCONTROLLED TYPE 2 DIABETES MELLITUS WITH HYPERGLYCEMIA: Primary | ICD-10-CM

## 2022-11-15 RX ORDER — PEN NEEDLE, DIABETIC 31 GX5/16"
NEEDLE, DISPOSABLE MISCELLANEOUS
Qty: 100 EACH | Refills: 3 | Status: SHIPPED | OUTPATIENT
Start: 2022-11-15

## 2022-11-15 RX ORDER — INSULIN DETEMIR 100 [IU]/ML
10 INJECTION, SOLUTION SUBCUTANEOUS NIGHTLY
Qty: 6 ML | Refills: 6 | Status: SHIPPED | OUTPATIENT
Start: 2022-11-15 | End: 2022-11-16 | Stop reason: SDUPTHER

## 2022-11-16 DIAGNOSIS — E11.65 UNCONTROLLED TYPE 2 DIABETES MELLITUS WITH HYPERGLYCEMIA: ICD-10-CM

## 2022-11-16 DIAGNOSIS — E13.9 LADA (LATENT AUTOIMMUNE DIABETES IN ADULTS), MANAGED AS TYPE 2: ICD-10-CM

## 2022-11-16 RX ORDER — INSULIN DETEMIR 100 [IU]/ML
10 INJECTION, SOLUTION SUBCUTANEOUS NIGHTLY
Qty: 6 ML | Refills: 6 | Status: SHIPPED | OUTPATIENT
Start: 2022-11-16 | End: 2022-11-21 | Stop reason: SDUPTHER

## 2022-11-19 ENCOUNTER — PATIENT MESSAGE (OUTPATIENT)
Dept: ENDOCRINOLOGY | Facility: CLINIC | Age: 52
End: 2022-11-19
Payer: COMMERCIAL

## 2022-11-19 DIAGNOSIS — E13.9 LADA (LATENT AUTOIMMUNE DIABETES IN ADULTS), MANAGED AS TYPE 2: ICD-10-CM

## 2022-11-19 DIAGNOSIS — E11.65 UNCONTROLLED TYPE 2 DIABETES MELLITUS WITH HYPERGLYCEMIA: ICD-10-CM

## 2022-11-21 ENCOUNTER — CLINICAL SUPPORT (OUTPATIENT)
Dept: DIABETES | Facility: CLINIC | Age: 52
End: 2022-11-21
Payer: COMMERCIAL

## 2022-11-21 ENCOUNTER — PATIENT MESSAGE (OUTPATIENT)
Dept: ENDOCRINOLOGY | Facility: CLINIC | Age: 52
End: 2022-11-21
Payer: COMMERCIAL

## 2022-11-21 DIAGNOSIS — E11.65 UNCONTROLLED TYPE 2 DIABETES MELLITUS WITH HYPERGLYCEMIA: ICD-10-CM

## 2022-11-21 DIAGNOSIS — E13.9 LADA (LATENT AUTOIMMUNE DIABETES IN ADULTS), MANAGED AS TYPE 2: ICD-10-CM

## 2022-11-21 PROCEDURE — 99999 PR PBB SHADOW E&M-EST. PATIENT-LVL II: CPT | Mod: PBBFAC,,,

## 2022-11-21 PROCEDURE — G0108 DIAB MANAGE TRN  PER INDIV: HCPCS | Mod: S$GLB,,, | Performed by: INTERNAL MEDICINE

## 2022-11-21 PROCEDURE — 99999 PR PBB SHADOW E&M-EST. PATIENT-LVL II: ICD-10-PCS | Mod: PBBFAC,,,

## 2022-11-21 PROCEDURE — G0108 PR DIAB MANAGE TRN  PER INDIV: ICD-10-PCS | Mod: S$GLB,,, | Performed by: INTERNAL MEDICINE

## 2022-11-21 RX ORDER — INSULIN DETEMIR 100 [IU]/ML
10 INJECTION, SOLUTION SUBCUTANEOUS NIGHTLY
Qty: 6 ML | Refills: 6 | Status: SHIPPED | OUTPATIENT
Start: 2022-11-21 | End: 2023-02-02 | Stop reason: SDUPTHER

## 2022-11-21 NOTE — PROGRESS NOTES
Diabetes Care Specialist Progress Note  Author: Twyla Smyth RD, CDE  Date: 2022    Program Intake  Reason for Diabetes Program Visit:: Initial Diabetes Assessment  Type of Intervention:: Individual  Individual: Education, Device Training, Medication Training  Education: Self-Management Skill Review  Medication Training: Insulin Pen    Current diabetes risk level:: high      Lab Results   Component Value Date    HGBA1C 10.2 (H) 09/15/2022         Clinical  Problem Review  Reviewed Problem List with Patient: yes  Active comorbidities affecting diabetes self-care.: no  Reviewed health maintenance: yes    Clinical Assessment  Current Diabetes Treatment: Oral Medication, Injectable, Insulin  Metformin BID  Trulicity 4.5 weekly   Glimiperide    To start Levemir 10 units nightly        Have you ever experienced hypoglycemia (low blood sugar)?: no  Have you ever experienced hyperglycemia (high blood sugar)?: yes  In the last month, how often have you experienced high blood sugar?: once a day    SMBG 3 times daily  FB-250  Pre-lunch: 100-145  Bedtime: 190-210        Medication Information  How many days a week do you miss your medications?: Never  Do you sometimes have difficulty refilling your medications?: No  Medication adherence impacting ability to self-manage diabetes?: No    Labs  Do you have regular lab work to monitor your medications?: Yes  Lab Compliance Barriers: No        Nutritional Status  Diet: Regular, Diabetic diet (3 meals daily; limits sugars and carbs; SF drinks only)  Change in appetite?: No  Current nutritional status an area of need that is impacting patient's ability to self-manage diabetes?: No          Additional Social History  Support  Does anyone support you with your diabetes care?: yes  Who supports you?: spouse  Who takes you to your medical appointments?: self  Does the current support meet the patient's needs?: Yes  Is Support an area impacting ability to self-manage  diabetes?: No    Access to Mass Media & Technology  Does the patient have access to any of the following devices or technologies?: Smart phone, Internet Access  Media or technology needs impacting ability to self-manage diabetes?: No    Cognitive/Behavioral Health  Alert and Oriented: Yes  Difficulty Thinking: No  Requires Prompting: No  Requires assistance for routine expression?: No  Cognitive or behavioral barriers impacting ability to self-manage diabetes?: No    Culture/Islam  Culture or Druze beliefs that may impact ability to access healthcare: No    Communication  Language preference: English  Hearing Problems: No  Vision Problems: No  Communication needs impacting ability to self-manage diabetes?: No    Health Literacy  Preferred Learning Method: Face to Face, Video, Demonstration, Hands On, Web Based, Reading Materials  How often do you need to have someone help you read instructions, pamphlets, or written material from your doctor or pharmacy?: Never  Health literacy needs impacting ability to self-manage diabetes?: No            Diabetes Self-Management Skills Assessment  Diabetes Disease Process/Treatment Options  Patient/caregiver able to state what happens when someone has diabetes.: yes  Patient/caregiver knows what type of diabetes they have.: yes  Diabetes Type : Other (recent MAX dx)  Patient/caregiver able to identify at least three signs and symptoms of diabetes.: yes  Diabetes Disease Process/Treatment Options: Skills Assessment Completed: Yes  Assessment indicates:: Instruction Needed  Area of need?: Yes    Nutrition/Healthy Eating  Method of carbohydrate measurement:: carb counting/reading labels, portion plate  Patient can identify foods that impact blood sugar.: yes  Nutrition/Healthy Eating Skills Assessment Completed:: Yes  Assessment indicates:: Adequate understanding  Area of need?: No    Physical Activity/Exercise  Patient formally exercises outside of work.: no  Patient can  identify forms of physical activity.: yes  Patient can identify reasons why exercise/physical activity is important in diabetes management.: yes  Physical Activity/Exercise Skills Assessment Completed: : Yes  Assessment indicates:: Instruction Needed  Area of need?: Yes    Medications  Patient is able to describe current diabetes management routine.: yes  Diabetes management routine:: injectable medications, insulin, oral medications  Patient is able to identify current diabetes medications, dosages, and appropriate timing of medications.: no (new to insulin)  Patient reports problems or concerns with current medication regimen.: no  Medication Skills Assessment Completed:: Yes  Assessment indicates:: Instruction Needed  Area of need?: Yes    Home Blood Glucose Monitoring  Patient states that blood sugar is checked at home daily.: yes  Monitoring Method:: home glucometer  How often do you check your blood sugar?: 3 times a day  When do you check your blood sugar?: Before breakfast, Before lunch, Before bedtime  Blood glucose logs:: yes  Blood glucose logs reviewed today?: yes  Home Blood Glucose Monitoring Skills Assessment Completed: : Yes  Assessment indicates:: Instruction Needed  Area of need?: Yes    Acute Complications  Patient is able to identify types of acute complications: Yes  Patient Identified:: Hyperglycemia  Able to state the blood sugar range for hyperglycemia?: yes  Acute Complications Skills Assessment Completed: : Yes  Assessment indicates:: Instruction Needed  Area of need?: Yes    Chronic Complications  Patient can identify major chronic complications of diabetes.: yes  Patient can identify ways to prevent or delay diabetes complications.: yes  Chronic Complications Skills Assessment Completed: : Yes  Assessment indicates:: Adequate understanding  Area of need?: No    Psychosocial/Coping  Patient can identify ways of coping with chronic disease.: yes  Psychosocial/Coping Skills Assessment  Completed: : Yes  Area of need?: No          Diabetes Self Support Plan    Assessment Summary and Plan    Based on today's diabetes care assessment, the following areas of need were identified:      Social 11/21/2022   Support No   Access to Mass Media/Tech No   Cognitive/Behavioral Health No   Culture/Holiness No   Communication No   Health Literacy No        Clinical 11/21/2022   Medication Adherence No   Lab Compliance No   Nutritional Status No        Diabetes Self-Management Skills 11/21/2022   Diabetes Disease Process/Treatment Options Yes    Discussed MAX dx and progression.   Discussed current and future treatment options.         -Discussed qualifying parameters of diabetes dx. Discussed s/s typically associated with onset.  -Discussed risk factors of DM and identified possible risk factors leading to pt's onset.   -Discussed disease process and general progression over time.   -Reviewed current treatment options, especially dietary and lifestyle changes as well as medication additions and/or changes.     Nutrition/Healthy Eating No   Physical Activity/Exercise Yes  -Discussed goals and benefits of regular physical activity.   -Reviewed difference between active lifestyle and structured physical activity.   -Encouraged physical activity with increased heart rate for sustained duration as tolerated.   -Reviewed physical activity goals of >150 minutes per week.     Medication Yes    See Care Plan       Home Blood Glucose Monitoring Yes  Discussed goal BGs for different times of day and in relation to meals. Instructed pt to test BG AC/HS: fasting and 2-hours after any meal. Reviewed need for updated BG logs for all endo, PCP, and education appts.    Introduced to CGM. Would benefit from Dexcom given MAX dx.   RX request to MD.       Acute Complications Yes  - Discussed hypoglycemia vs hyperglycemia symptoms and discussed appropriate treatments for each.   - Reviewed that pt is at risk of hypo with current  medication regimen.   - Discussed general vs severe hyperglycemia and risk of DKA/HHS.       Chronic Complications No   Psychosocial/Coping No      Today's interventions were provided through individual discussion, instruction, and written materials were provided.      Patient verbalized understanding of instruction and written materials.  Pt was able to return back demonstration of instructions today. Patient understood key points, needs reinforcement and further instruction.             Diabetes Self-Management Care Plan:    Today's Diabetes Self-Management Care Plan was developed with Filiberto's input. Filiberto has agreed to work toward the following goal(s) to improve his/her overall diabetes control.      Care Plan: Diabetes Management   Updates made since 10/22/2022 12:00 AM        Problem: Medications         Goal: Pt will take insulin as instructed today    Start Date: 11/21/2022   Expected End Date: 11/22/2023   Priority: High   Barriers: Knowledge deficit   Note:    Trained on how to use basal insulin today.   Will be starting 10 units Levemir in the evening.     -Discussed timing and MOA of long acting insulin.   -Reviewed need for rotation of injection sites, appropriate insulin storage, and insulin pen use.   -Emphasized need to take levemir at same time daily       Doing well with SMBG and dietary choices.   Knows he needs to exercise more. Likes hiking but limited ability here.     Discussed timing and MOA of all current meds along with medication choices in the future pending beta cell function.            Task: Reviewed with patient all current diabetes medications and provided basic review of the purpose, dosage, frequency, side effects, and storage of both oral and injectable diabetes medications. Completed 11/21/2022        Task: Instructed patient on how to self-administer insulin via insulin pen Completed 11/21/2022        Task: Discussed guidelines for preventing, detecting and treating hypoglycemia  and hyperglycemia and reviewed the importance of meal and medication timing with diabetes mediations for prevention of hypoglycemia and maximum drug benefit. Completed 11/21/2022                  Follow Up Plan     F/u in 1 month with BG logs        Today's care plan and follow up schedule was discussed with patient.  Filiberto verbalized understanding of the care plan, goals, and agrees to follow up plan.        The patient was encouraged to communicate with his/her health care provider/physician and care team regarding his/her condition(s) and treatment.  I provided the patient with my contact information today and encouraged to contact me via phone or [facility] Patient Portal as needed.         Length of Visit   Total Time: 60 Minutes

## 2022-11-21 NOTE — Clinical Note
Abraham Owens!  Saw him today and discussed MAX. Trained on insulin pen.  Taught him titration instructions but not to go about 20 units.   We also discussed CGM - can you send in RX for Dexcom?   Thanks!

## 2022-12-06 ENCOUNTER — PATIENT MESSAGE (OUTPATIENT)
Dept: ENDOCRINOLOGY | Facility: CLINIC | Age: 52
End: 2022-12-06
Payer: COMMERCIAL

## 2022-12-06 DIAGNOSIS — E13.9 LADA (LATENT AUTOIMMUNE DIABETES IN ADULTS), MANAGED AS TYPE 2: Primary | ICD-10-CM

## 2022-12-06 DIAGNOSIS — E11.65 UNCONTROLLED TYPE 2 DIABETES MELLITUS WITH HYPERGLYCEMIA: ICD-10-CM

## 2022-12-09 RX ORDER — BLOOD-GLUCOSE SENSOR
3 EACH MISCELLANEOUS CONTINUOUS
Qty: 3 EACH | Refills: 11 | Status: SHIPPED | OUTPATIENT
Start: 2022-12-09 | End: 2023-08-28

## 2022-12-09 RX ORDER — BLOOD-GLUCOSE TRANSMITTER
1 EACH MISCELLANEOUS CONTINUOUS
Qty: 1 EACH | Refills: 3 | Status: SHIPPED | OUTPATIENT
Start: 2022-12-09 | End: 2023-08-28

## 2022-12-09 RX ORDER — BLOOD-GLUCOSE,RECEIVER,CONT
1 EACH MISCELLANEOUS CONTINUOUS
Qty: 1 EACH | Refills: 1 | Status: SHIPPED | OUTPATIENT
Start: 2022-12-09 | End: 2023-08-28

## 2022-12-12 ENCOUNTER — IMMUNIZATION (OUTPATIENT)
Dept: INTERNAL MEDICINE | Facility: CLINIC | Age: 52
End: 2022-12-12
Payer: COMMERCIAL

## 2022-12-12 ENCOUNTER — OFFICE VISIT (OUTPATIENT)
Dept: INTERNAL MEDICINE | Facility: CLINIC | Age: 52
End: 2022-12-12
Payer: COMMERCIAL

## 2022-12-12 ENCOUNTER — LAB VISIT (OUTPATIENT)
Dept: LAB | Facility: HOSPITAL | Age: 52
End: 2022-12-12
Payer: COMMERCIAL

## 2022-12-12 VITALS
HEIGHT: 75 IN | DIASTOLIC BLOOD PRESSURE: 76 MMHG | OXYGEN SATURATION: 99 % | WEIGHT: 189.94 LBS | BODY MASS INDEX: 23.62 KG/M2 | SYSTOLIC BLOOD PRESSURE: 128 MMHG | HEART RATE: 92 BPM

## 2022-12-12 DIAGNOSIS — E11.65 TYPE 2 DIABETES MELLITUS WITH HYPERGLYCEMIA, WITHOUT LONG-TERM CURRENT USE OF INSULIN: Primary | ICD-10-CM

## 2022-12-12 DIAGNOSIS — I10 ESSENTIAL HYPERTENSION: ICD-10-CM

## 2022-12-12 DIAGNOSIS — E11.9 TYPE 2 DIABETES MELLITUS WITHOUT COMPLICATION: ICD-10-CM

## 2022-12-12 DIAGNOSIS — E13.9 LADA (LATENT AUTOIMMUNE DIABETES IN ADULTS), MANAGED AS TYPE 2: ICD-10-CM

## 2022-12-12 DIAGNOSIS — E78.2 MIXED HYPERLIPIDEMIA: ICD-10-CM

## 2022-12-12 LAB
ALBUMIN/CREAT UR: 6.6 UG/MG (ref 0–30)
CREAT UR-MCNC: 198 MG/DL (ref 23–375)
MICROALBUMIN UR DL<=1MG/L-MCNC: 13 UG/ML

## 2022-12-12 PROCEDURE — 99999 PR PBB SHADOW E&M-EST. PATIENT-LVL III: CPT | Mod: PBBFAC,,, | Performed by: INTERNAL MEDICINE

## 2022-12-12 PROCEDURE — 3046F PR MOST RECENT HEMOGLOBIN A1C LEVEL > 9.0%: ICD-10-PCS | Mod: CPTII,S$GLB,, | Performed by: INTERNAL MEDICINE

## 2022-12-12 PROCEDURE — 4010F PR ACE/ARB THEARPY RXD/TAKEN: ICD-10-PCS | Mod: CPTII,S$GLB,, | Performed by: INTERNAL MEDICINE

## 2022-12-12 PROCEDURE — 99214 PR OFFICE/OUTPT VISIT, EST, LEVL IV, 30-39 MIN: ICD-10-PCS | Mod: 25,S$GLB,, | Performed by: INTERNAL MEDICINE

## 2022-12-12 PROCEDURE — 1159F PR MEDICATION LIST DOCUMENTED IN MEDICAL RECORD: ICD-10-PCS | Mod: CPTII,S$GLB,, | Performed by: INTERNAL MEDICINE

## 2022-12-12 PROCEDURE — 90471 FLU VACCINE (QUAD) GREATER THAN OR EQUAL TO 3YO PRESERVATIVE FREE IM: ICD-10-PCS | Mod: S$GLB,,, | Performed by: INTERNAL MEDICINE

## 2022-12-12 PROCEDURE — 4010F ACE/ARB THERAPY RXD/TAKEN: CPT | Mod: CPTII,S$GLB,, | Performed by: INTERNAL MEDICINE

## 2022-12-12 PROCEDURE — 1159F MED LIST DOCD IN RCRD: CPT | Mod: CPTII,S$GLB,, | Performed by: INTERNAL MEDICINE

## 2022-12-12 PROCEDURE — 99999 PR PBB SHADOW E&M-EST. PATIENT-LVL III: ICD-10-PCS | Mod: PBBFAC,,, | Performed by: INTERNAL MEDICINE

## 2022-12-12 PROCEDURE — 3074F PR MOST RECENT SYSTOLIC BLOOD PRESSURE < 130 MM HG: ICD-10-PCS | Mod: CPTII,S$GLB,, | Performed by: INTERNAL MEDICINE

## 2022-12-12 PROCEDURE — 3078F DIAST BP <80 MM HG: CPT | Mod: CPTII,S$GLB,, | Performed by: INTERNAL MEDICINE

## 2022-12-12 PROCEDURE — 3008F PR BODY MASS INDEX (BMI) DOCUMENTED: ICD-10-PCS | Mod: CPTII,S$GLB,, | Performed by: INTERNAL MEDICINE

## 2022-12-12 PROCEDURE — 90686 FLU VACCINE (QUAD) GREATER THAN OR EQUAL TO 3YO PRESERVATIVE FREE IM: ICD-10-PCS | Mod: S$GLB,,, | Performed by: INTERNAL MEDICINE

## 2022-12-12 PROCEDURE — 1160F PR REVIEW ALL MEDS BY PRESCRIBER/CLIN PHARMACIST DOCUMENTED: ICD-10-PCS | Mod: CPTII,S$GLB,, | Performed by: INTERNAL MEDICINE

## 2022-12-12 PROCEDURE — 3074F SYST BP LT 130 MM HG: CPT | Mod: CPTII,S$GLB,, | Performed by: INTERNAL MEDICINE

## 2022-12-12 PROCEDURE — 3046F HEMOGLOBIN A1C LEVEL >9.0%: CPT | Mod: CPTII,S$GLB,, | Performed by: INTERNAL MEDICINE

## 2022-12-12 PROCEDURE — 82570 ASSAY OF URINE CREATININE: CPT | Performed by: INTERNAL MEDICINE

## 2022-12-12 PROCEDURE — 90686 IIV4 VACC NO PRSV 0.5 ML IM: CPT | Mod: S$GLB,,, | Performed by: INTERNAL MEDICINE

## 2022-12-12 PROCEDURE — 82043 UR ALBUMIN QUANTITATIVE: CPT | Performed by: INTERNAL MEDICINE

## 2022-12-12 PROCEDURE — 3008F BODY MASS INDEX DOCD: CPT | Mod: CPTII,S$GLB,, | Performed by: INTERNAL MEDICINE

## 2022-12-12 PROCEDURE — 3078F PR MOST RECENT DIASTOLIC BLOOD PRESSURE < 80 MM HG: ICD-10-PCS | Mod: CPTII,S$GLB,, | Performed by: INTERNAL MEDICINE

## 2022-12-12 PROCEDURE — 99214 OFFICE O/P EST MOD 30 MIN: CPT | Mod: 25,S$GLB,, | Performed by: INTERNAL MEDICINE

## 2022-12-12 PROCEDURE — 90471 IMMUNIZATION ADMIN: CPT | Mod: S$GLB,,, | Performed by: INTERNAL MEDICINE

## 2022-12-12 PROCEDURE — 1160F RVW MEDS BY RX/DR IN RCRD: CPT | Mod: CPTII,S$GLB,, | Performed by: INTERNAL MEDICINE

## 2022-12-12 RX ORDER — PRAVASTATIN SODIUM 20 MG/1
20 TABLET ORAL EVERY MORNING
Qty: 90 TABLET | Refills: 3 | Status: SHIPPED | OUTPATIENT
Start: 2022-12-12 | End: 2024-01-29 | Stop reason: SDUPTHER

## 2022-12-12 RX ORDER — METFORMIN HYDROCHLORIDE 500 MG/1
1000 TABLET ORAL 2 TIMES DAILY WITH MEALS
Qty: 360 TABLET | Refills: 3 | Status: SHIPPED | OUTPATIENT
Start: 2022-12-12 | End: 2024-03-07 | Stop reason: SDUPTHER

## 2022-12-12 RX ORDER — LOSARTAN POTASSIUM 25 MG/1
25 TABLET ORAL DAILY
Qty: 90 TABLET | Refills: 3 | Status: SHIPPED | OUTPATIENT
Start: 2022-12-12 | End: 2023-12-20 | Stop reason: SDUPTHER

## 2022-12-12 NOTE — PROGRESS NOTES
"INTERNAL MEDICINE ESTABLISHED PATIENT VISIT NOTE    Subjective:     Chief Complaint: Follow-up       Patient ID: Filiberto Cohn is a 52 y.o. male with ADHD, HTN, HLD, uncontrolled T2DM (9/2022 HgbA1C 10.2), last seen by me 8/2022, here today for follow-up.    9/2022 Endo - Started on Glimepiride 4, Jardiance 10; continue Trulicity 4.5, Metformin 1 BID. Actos stopped.     Subsequent bloodwork confirmed MAX diagnosis. Jardiance stopped at that time. Due to persistently elevated BG, started on Levemir around end of November. Advised to increase 2u every few nights until goal AM BG of 150 reached. Presently taking Levemir 14u daily with BG improved to 180s. Dexcom applied for and approved.     Past Medical History:  Past Medical History:   Diagnosis Date    Diabetes mellitus type II     Hyperlipidemia     Hypertension        Review of Systems:  Review of Systems   Constitutional:  Negative for chills and fever.   HENT:  Negative for congestion.    Respiratory:  Negative for cough and shortness of breath.    Cardiovascular:  Negative for chest pain.   Gastrointestinal:  Negative for constipation, nausea and vomiting.   Genitourinary:  Negative for hematuria and urgency.   Musculoskeletal:  Negative for falls.   Skin:  Negative for rash.   Neurological:  Negative for dizziness and loss of consciousness.     Health Maintenance:   Immunizations:   Influenza - today   Tdap - next visit  Covid 19 - complete  HPV  Prevnar rec at 65 - Pneumovax 10/2017  Shingrix rec at 50 - next visit     Cancer Screening:  PAP: n/a  Mammogram:  n/a  Colonoscopy:  3/2022 suboptimal prep, repeat 1 yr  DEXA:  n/a     Objective:   /76 (BP Location: Left arm, Patient Position: Sitting, BP Method: Medium (Manual))   Pulse 92   Ht 6' 3" (1.905 m)   Wt 86.1 kg (189 lb 14.8 oz)   SpO2 99%   BMI 23.74 kg/m²        Labs:       Assessment/Plan     Type 2 diabetes mellitus with hyperglycemia, without long-term current use of insulin  9/2022 " HgbA1C 10.2, improving  Following with Endocrinology, new diagnosis of MAX; current regimen of Levemir, Glimepiride along with Trulicity and Metformin  -     metFORMIN (GLUCOPHAGE) 500 MG tablet; Take 2 tablets (1,000 mg total) by mouth 2 (two) times daily with meals.  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  Dispense: 90 tablet; Refill: 3    Mixed hyperlipidemia  Controlled, continue current regimen  -     pravastatin (PRAVACHOL) 20 MG tablet; Take 1 tablet (20 mg total) by mouth every morning.  Dispense: 90 tablet; Refill: 3    HM as above.    Aleja Zhou MD  Department of Internal Medicine - [facility]  12/12/2022

## 2022-12-15 ENCOUNTER — PATIENT MESSAGE (OUTPATIENT)
Dept: ENDOCRINOLOGY | Facility: CLINIC | Age: 52
End: 2022-12-15
Payer: COMMERCIAL

## 2022-12-29 ENCOUNTER — PATIENT MESSAGE (OUTPATIENT)
Dept: ENDOCRINOLOGY | Facility: CLINIC | Age: 52
End: 2022-12-29
Payer: COMMERCIAL

## 2023-01-15 ENCOUNTER — PATIENT MESSAGE (OUTPATIENT)
Dept: ENDOCRINOLOGY | Facility: CLINIC | Age: 53
End: 2023-01-15
Payer: COMMERCIAL

## 2023-01-15 DIAGNOSIS — E11.65 TYPE 2 DIABETES MELLITUS WITH HYPERGLYCEMIA, WITHOUT LONG-TERM CURRENT USE OF INSULIN: ICD-10-CM

## 2023-01-17 RX ORDER — DULAGLUTIDE 4.5 MG/.5ML
4.5 INJECTION, SOLUTION SUBCUTANEOUS
Qty: 12 PEN | Refills: 3 | Status: SHIPPED | OUTPATIENT
Start: 2023-01-17 | End: 2023-01-23

## 2023-01-22 ENCOUNTER — PATIENT MESSAGE (OUTPATIENT)
Dept: ENDOCRINOLOGY | Facility: CLINIC | Age: 53
End: 2023-01-22
Payer: COMMERCIAL

## 2023-01-22 DIAGNOSIS — E13.9 LADA (LATENT AUTOIMMUNE DIABETES IN ADULTS), MANAGED AS TYPE 2: Primary | ICD-10-CM

## 2023-01-22 DIAGNOSIS — E66.9 OBESITY (BMI 30-39.9): ICD-10-CM

## 2023-01-23 ENCOUNTER — PATIENT MESSAGE (OUTPATIENT)
Dept: ENDOCRINOLOGY | Facility: CLINIC | Age: 53
End: 2023-01-23
Payer: COMMERCIAL

## 2023-01-23 DIAGNOSIS — E13.9 LADA (LATENT AUTOIMMUNE DIABETES IN ADULTS), MANAGED AS TYPE 2: Primary | ICD-10-CM

## 2023-01-23 RX ORDER — TIRZEPATIDE 5 MG/.5ML
5 INJECTION, SOLUTION SUBCUTANEOUS
Qty: 4 PEN | Refills: 3 | Status: SHIPPED | OUTPATIENT
Start: 2023-01-23 | End: 2023-02-02

## 2023-01-23 NOTE — TELEPHONE ENCOUNTER
MAX treated as Type 2     On GLP-1a to decrease insulin resistance and weight loss benefits.     Will switch Trulicity for Mounjaro

## 2023-01-25 ENCOUNTER — PATIENT MESSAGE (OUTPATIENT)
Dept: ENDOCRINOLOGY | Facility: CLINIC | Age: 53
End: 2023-01-25
Payer: COMMERCIAL

## 2023-01-31 ENCOUNTER — LAB VISIT (OUTPATIENT)
Dept: LAB | Facility: HOSPITAL | Age: 53
End: 2023-01-31
Attending: GENERAL ACUTE CARE HOSPITAL
Payer: COMMERCIAL

## 2023-01-31 DIAGNOSIS — E13.9 LADA (LATENT AUTOIMMUNE DIABETES IN ADULTS), MANAGED AS TYPE 2: ICD-10-CM

## 2023-01-31 LAB
ALBUMIN SERPL BCP-MCNC: 4 G/DL (ref 3.5–5.2)
ANION GAP SERPL CALC-SCNC: 9 MMOL/L (ref 8–16)
BUN SERPL-MCNC: 12 MG/DL (ref 6–20)
C PEPTIDE SERPL-MCNC: 1.45 NG/ML (ref 0.78–5.19)
CALCIUM SERPL-MCNC: 9.3 MG/DL (ref 8.7–10.5)
CHLORIDE SERPL-SCNC: 101 MMOL/L (ref 95–110)
CO2 SERPL-SCNC: 27 MMOL/L (ref 23–29)
CREAT SERPL-MCNC: 0.8 MG/DL (ref 0.5–1.4)
EST. GFR  (NO RACE VARIABLE): >60 ML/MIN/1.73 M^2
GLUCOSE SERPL-MCNC: 140 MG/DL (ref 70–110)
PHOSPHATE SERPL-MCNC: 3.3 MG/DL (ref 2.7–4.5)
POTASSIUM SERPL-SCNC: 4.3 MMOL/L (ref 3.5–5.1)
SODIUM SERPL-SCNC: 137 MMOL/L (ref 136–145)

## 2023-01-31 PROCEDURE — 36415 COLL VENOUS BLD VENIPUNCTURE: CPT | Performed by: GENERAL ACUTE CARE HOSPITAL

## 2023-01-31 PROCEDURE — 80069 RENAL FUNCTION PANEL: CPT | Performed by: GENERAL ACUTE CARE HOSPITAL

## 2023-01-31 PROCEDURE — 84681 ASSAY OF C-PEPTIDE: CPT | Performed by: GENERAL ACUTE CARE HOSPITAL

## 2023-02-02 ENCOUNTER — PATIENT MESSAGE (OUTPATIENT)
Dept: ENDOCRINOLOGY | Facility: CLINIC | Age: 53
End: 2023-02-02
Payer: COMMERCIAL

## 2023-02-02 DIAGNOSIS — E13.9 LADA (LATENT AUTOIMMUNE DIABETES IN ADULTS), MANAGED AS TYPE 2: Primary | ICD-10-CM

## 2023-02-02 DIAGNOSIS — E13.9 LADA (LATENT AUTOIMMUNE DIABETES IN ADULTS), MANAGED AS TYPE 2: ICD-10-CM

## 2023-02-02 DIAGNOSIS — E11.65 UNCONTROLLED TYPE 2 DIABETES MELLITUS WITH HYPERGLYCEMIA: ICD-10-CM

## 2023-02-02 RX ORDER — INSULIN DETEMIR 100 [IU]/ML
26 INJECTION, SOLUTION SUBCUTANEOUS NIGHTLY
Qty: 9 ML | Refills: 11 | Status: SHIPPED | OUTPATIENT
Start: 2023-02-02 | End: 2023-02-06 | Stop reason: SDUPTHER

## 2023-02-02 RX ORDER — SEMAGLUTIDE 1.34 MG/ML
1 INJECTION, SOLUTION SUBCUTANEOUS
Qty: 1 PEN | Refills: 11 | Status: SHIPPED | OUTPATIENT
Start: 2023-02-02 | End: 2023-02-28

## 2023-02-06 ENCOUNTER — PATIENT MESSAGE (OUTPATIENT)
Dept: ENDOCRINOLOGY | Facility: CLINIC | Age: 53
End: 2023-02-06
Payer: COMMERCIAL

## 2023-02-06 DIAGNOSIS — E11.65 UNCONTROLLED TYPE 2 DIABETES MELLITUS WITH HYPERGLYCEMIA: ICD-10-CM

## 2023-02-06 DIAGNOSIS — E13.9 LADA (LATENT AUTOIMMUNE DIABETES IN ADULTS), MANAGED AS TYPE 2: ICD-10-CM

## 2023-02-06 RX ORDER — INSULIN DETEMIR 100 [IU]/ML
26 INJECTION, SOLUTION SUBCUTANEOUS NIGHTLY
Qty: 9 ML | Refills: 11 | Status: SHIPPED | OUTPATIENT
Start: 2023-02-06 | End: 2023-02-28 | Stop reason: SDUPTHER

## 2023-02-09 ENCOUNTER — PATIENT MESSAGE (OUTPATIENT)
Dept: ENDOCRINOLOGY | Facility: CLINIC | Age: 53
End: 2023-02-09
Payer: COMMERCIAL

## 2023-02-24 ENCOUNTER — PATIENT MESSAGE (OUTPATIENT)
Dept: ENDOCRINOLOGY | Facility: CLINIC | Age: 53
End: 2023-02-24
Payer: COMMERCIAL

## 2023-02-24 DIAGNOSIS — E13.9 LADA (LATENT AUTOIMMUNE DIABETES IN ADULTS), MANAGED AS TYPE 2: ICD-10-CM

## 2023-02-24 DIAGNOSIS — E11.65 UNCONTROLLED TYPE 2 DIABETES MELLITUS WITH HYPERGLYCEMIA: ICD-10-CM

## 2023-02-28 ENCOUNTER — PATIENT MESSAGE (OUTPATIENT)
Dept: ENDOCRINOLOGY | Facility: CLINIC | Age: 53
End: 2023-02-28
Payer: COMMERCIAL

## 2023-02-28 RX ORDER — INSULIN DETEMIR 100 [IU]/ML
26 INJECTION, SOLUTION SUBCUTANEOUS NIGHTLY
Qty: 24 ML | Refills: 3 | Status: SHIPPED | OUTPATIENT
Start: 2023-02-28 | End: 2023-03-02 | Stop reason: SDUPTHER

## 2023-02-28 RX ORDER — SEMAGLUTIDE 2.68 MG/ML
2 INJECTION, SOLUTION SUBCUTANEOUS
Qty: 1 PEN | Refills: 11 | Status: SHIPPED | OUTPATIENT
Start: 2023-02-28 | End: 2023-08-02 | Stop reason: SDUPTHER

## 2023-03-02 DIAGNOSIS — E11.65 UNCONTROLLED TYPE 2 DIABETES MELLITUS WITH HYPERGLYCEMIA: ICD-10-CM

## 2023-03-02 DIAGNOSIS — E13.9 LADA (LATENT AUTOIMMUNE DIABETES IN ADULTS), MANAGED AS TYPE 2: ICD-10-CM

## 2023-03-02 RX ORDER — INSULIN DETEMIR 100 [IU]/ML
26 INJECTION, SOLUTION SUBCUTANEOUS NIGHTLY
Qty: 7.8 ML | Refills: 11 | Status: SHIPPED | OUTPATIENT
Start: 2023-03-02 | End: 2024-01-05

## 2023-03-13 ENCOUNTER — PATIENT MESSAGE (OUTPATIENT)
Dept: ENDOCRINOLOGY | Facility: CLINIC | Age: 53
End: 2023-03-13
Payer: COMMERCIAL

## 2023-03-15 ENCOUNTER — PATIENT MESSAGE (OUTPATIENT)
Dept: ADMINISTRATIVE | Facility: HOSPITAL | Age: 53
End: 2023-03-15
Payer: COMMERCIAL

## 2023-03-27 ENCOUNTER — PATIENT MESSAGE (OUTPATIENT)
Dept: ENDOCRINOLOGY | Facility: CLINIC | Age: 53
End: 2023-03-27
Payer: COMMERCIAL

## 2023-04-13 ENCOUNTER — PATIENT MESSAGE (OUTPATIENT)
Dept: ENDOCRINOLOGY | Facility: CLINIC | Age: 53
End: 2023-04-13
Payer: COMMERCIAL

## 2023-04-30 ENCOUNTER — PATIENT MESSAGE (OUTPATIENT)
Dept: ENDOCRINOLOGY | Facility: CLINIC | Age: 53
End: 2023-04-30
Payer: COMMERCIAL

## 2023-05-15 ENCOUNTER — PATIENT MESSAGE (OUTPATIENT)
Dept: ENDOCRINOLOGY | Facility: CLINIC | Age: 53
End: 2023-05-15
Payer: COMMERCIAL

## 2023-06-05 ENCOUNTER — PATIENT MESSAGE (OUTPATIENT)
Dept: ENDOCRINOLOGY | Facility: CLINIC | Age: 53
End: 2023-06-05
Payer: COMMERCIAL

## 2023-07-14 ENCOUNTER — LAB VISIT (OUTPATIENT)
Dept: LAB | Facility: HOSPITAL | Age: 53
End: 2023-07-14
Attending: FAMILY MEDICINE
Payer: COMMERCIAL

## 2023-07-14 ENCOUNTER — OFFICE VISIT (OUTPATIENT)
Dept: INTERNAL MEDICINE | Facility: CLINIC | Age: 53
End: 2023-07-14
Payer: COMMERCIAL

## 2023-07-14 VITALS
HEART RATE: 82 BPM | BODY MASS INDEX: 24.07 KG/M2 | DIASTOLIC BLOOD PRESSURE: 78 MMHG | WEIGHT: 193.56 LBS | SYSTOLIC BLOOD PRESSURE: 136 MMHG | HEIGHT: 75 IN | OXYGEN SATURATION: 98 % | RESPIRATION RATE: 16 BRPM

## 2023-07-14 DIAGNOSIS — I10 ESSENTIAL HYPERTENSION: ICD-10-CM

## 2023-07-14 DIAGNOSIS — Z12.12 ENCOUNTER FOR COLORECTAL CANCER SCREENING: ICD-10-CM

## 2023-07-14 DIAGNOSIS — Z12.9 CANCER SCREENING: ICD-10-CM

## 2023-07-14 DIAGNOSIS — E78.5 HYPERLIPIDEMIA ASSOCIATED WITH TYPE 2 DIABETES MELLITUS: ICD-10-CM

## 2023-07-14 DIAGNOSIS — E11.9 TYPE 2 DIABETES MELLITUS WITHOUT COMPLICATION, WITHOUT LONG-TERM CURRENT USE OF INSULIN: ICD-10-CM

## 2023-07-14 DIAGNOSIS — Z12.5 PROSTATE CANCER SCREENING: ICD-10-CM

## 2023-07-14 DIAGNOSIS — Z12.11 ENCOUNTER FOR COLORECTAL CANCER SCREENING: ICD-10-CM

## 2023-07-14 DIAGNOSIS — E11.9 TYPE 2 DIABETES MELLITUS WITHOUT COMPLICATION, WITH LONG-TERM CURRENT USE OF INSULIN: ICD-10-CM

## 2023-07-14 DIAGNOSIS — Z79.899 ENCOUNTER FOR LONG-TERM (CURRENT) USE OF OTHER MEDICATIONS: ICD-10-CM

## 2023-07-14 DIAGNOSIS — Z76.89 ENCOUNTER TO ESTABLISH CARE WITH NEW DOCTOR: Primary | ICD-10-CM

## 2023-07-14 DIAGNOSIS — E11.69 HYPERLIPIDEMIA ASSOCIATED WITH TYPE 2 DIABETES MELLITUS: ICD-10-CM

## 2023-07-14 DIAGNOSIS — Z79.4 TYPE 2 DIABETES MELLITUS WITHOUT COMPLICATION, WITH LONG-TERM CURRENT USE OF INSULIN: ICD-10-CM

## 2023-07-14 LAB
COMPLEXED PSA SERPL-MCNC: 0.65 NG/ML (ref 0–4)
ESTIMATED AVG GLUCOSE: 148 MG/DL (ref 68–131)
HBA1C MFR BLD: 6.8 % (ref 4–5.6)

## 2023-07-14 PROCEDURE — 1160F RVW MEDS BY RX/DR IN RCRD: CPT | Mod: CPTII,S$GLB,, | Performed by: FAMILY MEDICINE

## 2023-07-14 PROCEDURE — 4010F PR ACE/ARB THEARPY RXD/TAKEN: ICD-10-PCS | Mod: CPTII,S$GLB,, | Performed by: FAMILY MEDICINE

## 2023-07-14 PROCEDURE — 99214 OFFICE O/P EST MOD 30 MIN: CPT | Mod: S$GLB,,, | Performed by: FAMILY MEDICINE

## 2023-07-14 PROCEDURE — 83036 HEMOGLOBIN GLYCOSYLATED A1C: CPT | Performed by: FAMILY MEDICINE

## 2023-07-14 PROCEDURE — 3008F BODY MASS INDEX DOCD: CPT | Mod: CPTII,S$GLB,, | Performed by: FAMILY MEDICINE

## 2023-07-14 PROCEDURE — 3078F DIAST BP <80 MM HG: CPT | Mod: CPTII,S$GLB,, | Performed by: FAMILY MEDICINE

## 2023-07-14 PROCEDURE — 3078F PR MOST RECENT DIASTOLIC BLOOD PRESSURE < 80 MM HG: ICD-10-PCS | Mod: CPTII,S$GLB,, | Performed by: FAMILY MEDICINE

## 2023-07-14 PROCEDURE — 36415 COLL VENOUS BLD VENIPUNCTURE: CPT | Performed by: FAMILY MEDICINE

## 2023-07-14 PROCEDURE — 99214 PR OFFICE/OUTPT VISIT, EST, LEVL IV, 30-39 MIN: ICD-10-PCS | Mod: S$GLB,,, | Performed by: FAMILY MEDICINE

## 2023-07-14 PROCEDURE — 1160F PR REVIEW ALL MEDS BY PRESCRIBER/CLIN PHARMACIST DOCUMENTED: ICD-10-PCS | Mod: CPTII,S$GLB,, | Performed by: FAMILY MEDICINE

## 2023-07-14 PROCEDURE — 3075F SYST BP GE 130 - 139MM HG: CPT | Mod: CPTII,S$GLB,, | Performed by: FAMILY MEDICINE

## 2023-07-14 PROCEDURE — 4010F ACE/ARB THERAPY RXD/TAKEN: CPT | Mod: CPTII,S$GLB,, | Performed by: FAMILY MEDICINE

## 2023-07-14 PROCEDURE — 3008F PR BODY MASS INDEX (BMI) DOCUMENTED: ICD-10-PCS | Mod: CPTII,S$GLB,, | Performed by: FAMILY MEDICINE

## 2023-07-14 PROCEDURE — 1159F PR MEDICATION LIST DOCUMENTED IN MEDICAL RECORD: ICD-10-PCS | Mod: CPTII,S$GLB,, | Performed by: FAMILY MEDICINE

## 2023-07-14 PROCEDURE — 3075F PR MOST RECENT SYSTOLIC BLOOD PRESS GE 130-139MM HG: ICD-10-PCS | Mod: CPTII,S$GLB,, | Performed by: FAMILY MEDICINE

## 2023-07-14 PROCEDURE — 99999 PR PBB SHADOW E&M-EST. PATIENT-LVL V: CPT | Mod: PBBFAC,,, | Performed by: FAMILY MEDICINE

## 2023-07-14 PROCEDURE — 99999 PR PBB SHADOW E&M-EST. PATIENT-LVL V: ICD-10-PCS | Mod: PBBFAC,,, | Performed by: FAMILY MEDICINE

## 2023-07-14 PROCEDURE — 84153 ASSAY OF PSA TOTAL: CPT | Performed by: FAMILY MEDICINE

## 2023-07-14 PROCEDURE — 1159F MED LIST DOCD IN RCRD: CPT | Mod: CPTII,S$GLB,, | Performed by: FAMILY MEDICINE

## 2023-07-14 RX ORDER — TRIAMCINOLONE ACETONIDE 1 MG/G
CREAM TOPICAL
COMMUNITY
Start: 2023-05-24 | End: 2023-08-28

## 2023-07-14 NOTE — PATIENT INSTRUCTIONS
Please reach out to:    [email or web address]    And provide him with your name and date of birth. It is all secure patient protected information to this email address.    Zeyad is our [facility] Clinical Research Coordinator for the [employer] 2 Study involving Galleri multicancer early detection.     Once he receives your information, he will be in touch as soon as possible about enrolling in study.

## 2023-07-14 NOTE — PROGRESS NOTES
"Subjective:       Patient ID: Filiberto Cohn is a 52 y.o. male.    Chief Complaint: Establish Care    HPI    Filiberto Cohn is a 52 y.o. male for checkup.     Dr. Tammie Mullins left, looking for new Endo    #Cards: HTN, HLD  - reg: Losartan 25 qd; Pravastatin 20 qd    #Endo: DM (MAX; A1c 9/2023 = 10.2)  - reg: Insulin (Levemir 26U qhs); Ozempic 2 qwk; Metformin 500 2tab bid; Glimepiride 4 qd  - using Dexcom  - eye exam: scheduled with outside ophtho for 7/2023  - foot exam 9/2022  - urine 12/2022    #Psych: Adult ADHD  - est w/ Dr. Handy Cowart  - reg: Adderall XR 15 qd    Review of Systems   Constitutional:  Negative for chills, fatigue and fever.   HENT:  Negative for congestion.    Respiratory:  Negative for cough and shortness of breath.    Cardiovascular:  Negative for chest pain and palpitations.   Gastrointestinal:  Negative for abdominal pain, constipation, diarrhea, nausea and vomiting.   Genitourinary:  Negative for dysuria and hematuria.   Musculoskeletal:  Negative for back pain.   Skin:  Negative for rash.   Neurological:  Negative for weakness, numbness and headaches.       Past Medical History:   Diagnosis Date    Diabetes mellitus type II     Hyperlipidemia     Hypertension         Prior to Admission medications    Medication Sig Start Date End Date Taking? Authorizing Provider   BD ULTRA-FINE KRUNAL PEN NEEDLE 32 gauge x 5/32" Ndle At bed time 11/15/22  Yes Roman Linton MD   blood-glucose meter,continuous (DEXCOM G6 ) Misc 1 Package by Misc.(Non-Drug; Combo Route) route continuous. 12/9/22 12/9/23 Yes Roman Linton MD   blood-glucose sensor (DEXCOM G6 SENSOR) Angie 3 each by Misc.(Non-Drug; Combo Route) route continuous. 12/9/22 12/9/23 Yes Roman Linton MD   blood-glucose transmitter (DEXCOM G6 TRANSMITTER) Angie 1 each by Misc.(Non-Drug; Combo Route) route continuous. 12/9/22 12/9/23 Yes Roman Linton MD   dextroamphetamine-amphetamine (ADDERALL XR) 15 MG 24 hr capsule Take 15 mg by mouth " "every morning. 2/21/15  Yes Historical Provider   fluticasone propionate (FLONASE) 50 mcg/actuation nasal spray 1 spray by Each Nostril route once daily.   Yes Historical Provider   glimepiride (AMARYL) 4 MG tablet Take 1 tablet (4 mg total) by mouth daily with breakfast. 1/4/23  Yes Michi Drew MD   insulin detemir U-100 (LEVEMIR FLEXTOUCH U-100 INSULN) 100 unit/mL (3 mL) InPn pen Inject 26 Units into the skin every evening. 3/2/23 3/1/24 Yes Roman Linton MD   losartan (COZAAR) 25 MG tablet Take 1 tablet (25 mg total) by mouth once daily. 12/12/22  Yes Tiffany Zhou MD   metFORMIN (GLUCOPHAGE) 500 MG tablet Take 2 tablets (1,000 mg total) by mouth 2 (two) times daily with meals. 12/12/22  Yes Tiffany Zhou MD   pravastatin (PRAVACHOL) 20 MG tablet Take 1 tablet (20 mg total) by mouth every morning. 12/12/22  Yes Tiffany Zhou MD   semaglutide (OZEMPIC) 2 mg/dose (8 mg/3 mL) PnIj Inject 2 mg into the skin every 7 days. 2/28/23  Yes Roman Linton MD   ibuprofen (ADVIL,MOTRIN) 600 MG tablet  7/9/19   Historical Provider   loratadine (CLARITIN) 10 mg tablet Take 10 mg by mouth once daily.    Historical Provider        Past medical history, surgical history, and family medical history reviewed and updated as appropriate.    Medications and allergies reviewed.     Objective:          Vitals:    07/14/23 0838   BP: 136/78   BP Location: Left arm   Patient Position: Sitting   BP Method: Medium (Manual)   Pulse: 82   Resp: 16   SpO2: 98%   Weight: 87.8 kg (193 lb 9 oz)   Height: 6' 3" (1.905 m)     Body mass index is 24.19 kg/m².  Physical Exam  Vitals and nursing note reviewed.   Constitutional:       General: He is not in acute distress.     Appearance: Normal appearance. He is well-developed.   Eyes:      Extraocular Movements: Extraocular movements intact.   Cardiovascular:      Rate and Rhythm: Normal rate and regular rhythm.      Pulses: Normal pulses.      Heart sounds: Normal heart sounds. No " murmur heard.  Pulmonary:      Effort: Pulmonary effort is normal. No respiratory distress.   Abdominal:      Palpations: Abdomen is soft.   Neurological:      Mental Status: He is alert and oriented to person, place, and time.   Psychiatric:         Mood and Affect: Mood normal.         Behavior: Behavior normal.       Lab Results   Component Value Date    WBC 5.41 12/06/2021    HGB 11.9 (L) 12/06/2021    HCT 38.2 (L) 12/06/2021     12/06/2021    CHOL 137 09/15/2022    TRIG 48 09/15/2022    HDL 46 09/15/2022    ALT 20 12/06/2021    ALT 20 12/06/2021    AST 18 12/06/2021    AST 18 12/06/2021     01/31/2023    K 4.3 01/31/2023     01/31/2023    CREATININE 0.8 01/31/2023    BUN 12 01/31/2023    CO2 27 01/31/2023    TSH 1.270 05/17/2019    PSA 0.65 12/06/2021    PSA 0.65 12/06/2021    HGBA1C 10.2 (H) 09/15/2022       Assessment:       1. Encounter to establish care with new doctor    2. Type 2 diabetes mellitus without complication, without long-term current use of insulin    3. Prostate cancer screening    4. Encounter for colorectal cancer screening    5. Hyperlipidemia associated with type 2 diabetes mellitus    6. Essential hypertension    7. Type 2 diabetes mellitus without complication, with long-term current use of insulin    8. Encounter for long-term (current) use of other medications    9. Cancer screening          Plan:   1. Encounter to establish care with new doctor    2. Type 2 diabetes mellitus without complication, without long-term current use of insulin  -     Hemoglobin A1C; Future; Expected date: 07/14/2023    3. Prostate cancer screening  -     PSA, Screening; Future; Expected date: 07/14/2023    4. Encounter for colorectal cancer screening  -     Ambulatory referral/consult to Endo Procedure ; Future; Expected date: 07/15/2023    5. Hyperlipidemia associated with type 2 diabetes mellitus  Overview:  Cont statin      6. Essential hypertension  Overview:  bp controlled,  cont reg      7. Type 2 diabetes mellitus without complication, with long-term current use of insulin    8. Encounter for long-term (current) use of other medications    9. Cancer screening  Overview:  Merle georges discussed 7/2023; info given          Health maintenance reviewed with patient.     No follow-ups on file.    Ryder Herrera MD  Family Medicine / Primary Care  [facility]  7/14/2023

## 2023-07-29 ENCOUNTER — PATIENT MESSAGE (OUTPATIENT)
Dept: INTERNAL MEDICINE | Facility: CLINIC | Age: 53
End: 2023-07-29
Payer: COMMERCIAL

## 2023-07-29 DIAGNOSIS — E11.65 UNCONTROLLED TYPE 2 DIABETES MELLITUS WITH HYPERGLYCEMIA: ICD-10-CM

## 2023-07-31 RX ORDER — GLIMEPIRIDE 4 MG/1
4 TABLET ORAL
Qty: 90 TABLET | Refills: 3 | Status: SHIPPED | OUTPATIENT
Start: 2023-07-31 | End: 2024-01-05

## 2023-07-31 NOTE — TELEPHONE ENCOUNTER
No care due was identified.  [facility] Embedded Care Due Messages. Reference number: [id number].   7/31/2023 8:57:00 AM CDT

## 2023-08-01 ENCOUNTER — PATIENT MESSAGE (OUTPATIENT)
Dept: INTERNAL MEDICINE | Facility: CLINIC | Age: 53
End: 2023-08-01
Payer: COMMERCIAL

## 2023-08-01 DIAGNOSIS — E11.65 UNCONTROLLED TYPE 2 DIABETES MELLITUS WITH HYPERGLYCEMIA: ICD-10-CM

## 2023-08-01 DIAGNOSIS — E13.9 LADA (LATENT AUTOIMMUNE DIABETES IN ADULTS), MANAGED AS TYPE 2: ICD-10-CM

## 2023-08-02 RX ORDER — SEMAGLUTIDE 2.68 MG/ML
2 INJECTION, SOLUTION SUBCUTANEOUS
Qty: 1 EACH | Refills: 11 | Status: SHIPPED | OUTPATIENT
Start: 2023-08-02 | End: 2023-08-28 | Stop reason: SDUPTHER

## 2023-08-21 DIAGNOSIS — E11.65 TYPE 2 DIABETES MELLITUS WITH HYPERGLYCEMIA, WITHOUT LONG-TERM CURRENT USE OF INSULIN: ICD-10-CM

## 2023-08-21 NOTE — TELEPHONE ENCOUNTER
No care due was identified.  [facility] Embedded Care Due Messages. Reference number: [id number].   8/21/2023 1:24:54 PM CDT

## 2023-08-22 RX ORDER — DULAGLUTIDE 4.5 MG/.5ML
INJECTION, SOLUTION SUBCUTANEOUS
Refills: 3 | OUTPATIENT
Start: 2023-08-22

## 2023-08-22 NOTE — TELEPHONE ENCOUNTER
Refill Decision Note   Filiberto Cohn  is requesting a refill authorization.  Brief Assessment and Rationale for Refill:  Quick Discontinue     Medication Therapy Plan:  Pt on ozempic      Comments:     Note composed:11:57 AM 08/22/2023            Subjective   Patient ID: Abrahan is a 13 year old male who is accompanied by:mother     Chief Complaint   Patient presents with   • Office Visit   • Ear Problem     ear wax- both ears       Here for ear wax removal    Review of Systems   All other systems reviewed and are negative.      Abrahan has a current medication list which includes the following prescription(s): hydrocortisone valerate, hydrocortisone, fluocinolone, and hydroxyzine.  Abrahan has No Known Allergies.    Objective   Vitals:Temp 97.7 °F (36.5 °C) (Temporal)   Wt 59.4 kg (130 lb 15.3 oz)   Physical Exam  Vitals reviewed.   Constitutional:       General: He is not in acute distress.     Appearance: Normal appearance. He is well-developed. He is not ill-appearing.   HENT:      Head: Normocephalic.      Right Ear: Tympanic membrane, ear canal and external ear normal. There is impacted cerumen.      Left Ear: Tympanic membrane, ear canal and external ear normal. There is impacted cerumen.      Nose: Nose normal.      Mouth/Throat:      Mouth: Mucous membranes are moist.      Pharynx: Oropharynx is clear.   Eyes:      General:         Right eye: No discharge.         Left eye: No discharge.      Extraocular Movements: Extraocular movements intact.      Conjunctiva/sclera: Conjunctivae normal.      Pupils: Pupils are equal, round, and reactive to light.   Cardiovascular:      Rate and Rhythm: Normal rate and regular rhythm.      Pulses: Normal pulses.      Heart sounds: Normal heart sounds.   Pulmonary:      Effort: Pulmonary effort is normal.      Breath sounds: Normal breath sounds.   Musculoskeletal:         General: No deformity. Normal range of motion.      Cervical back: Normal range of motion and neck supple.   Lymphadenopathy:      Cervical: No cervical adenopathy.   Skin:     General: Skin is warm.   Neurological:      Mental Status: He is alert and oriented to person, place, and time.         Assessment   Problem List Items Addressed This  Visit     None      Visit Diagnoses     Bilateral impacted cerumen    -  Primary    Need for vaccination        Relevant Orders    INFLUENZA QUADRIVALENT SPLIT PRES FREE 0.5 ML VACC, IM (FLULAVAL,FLUARIX,FLUZONE) (Completed)          Instructed to call if problem worsens or does not improve within the next 24 hours otherwise follow-up prn

## 2023-08-28 ENCOUNTER — PATIENT MESSAGE (OUTPATIENT)
Dept: ADMINISTRATIVE | Facility: OTHER | Age: 53
End: 2023-08-28
Payer: COMMERCIAL

## 2023-08-28 ENCOUNTER — PATIENT MESSAGE (OUTPATIENT)
Dept: INTERNAL MEDICINE | Facility: CLINIC | Age: 53
End: 2023-08-28
Payer: COMMERCIAL

## 2023-08-28 ENCOUNTER — OFFICE VISIT (OUTPATIENT)
Dept: ENDOCRINOLOGY | Facility: CLINIC | Age: 53
End: 2023-08-28
Payer: COMMERCIAL

## 2023-08-28 ENCOUNTER — PATIENT MESSAGE (OUTPATIENT)
Dept: ENDOCRINOLOGY | Facility: CLINIC | Age: 53
End: 2023-08-28

## 2023-08-28 DIAGNOSIS — I10 ESSENTIAL HYPERTENSION: ICD-10-CM

## 2023-08-28 DIAGNOSIS — E11.69 HYPERLIPIDEMIA ASSOCIATED WITH TYPE 2 DIABETES MELLITUS: ICD-10-CM

## 2023-08-28 DIAGNOSIS — E78.5 HYPERLIPIDEMIA ASSOCIATED WITH TYPE 2 DIABETES MELLITUS: ICD-10-CM

## 2023-08-28 DIAGNOSIS — E13.9 LADA (LATENT AUTOIMMUNE DIABETES IN ADULTS), MANAGED AS TYPE 2: Primary | ICD-10-CM

## 2023-08-28 PROCEDURE — 4010F PR ACE/ARB THEARPY RXD/TAKEN: ICD-10-PCS | Mod: CPTII,95,, | Performed by: INTERNAL MEDICINE

## 2023-08-28 PROCEDURE — 95251 CONT GLUC MNTR ANALYSIS I&R: CPT | Mod: NDTC,S$GLB,, | Performed by: INTERNAL MEDICINE

## 2023-08-28 PROCEDURE — 99214 PR OFFICE/OUTPT VISIT, EST, LEVL IV, 30-39 MIN: ICD-10-PCS | Mod: 25,95,, | Performed by: INTERNAL MEDICINE

## 2023-08-28 PROCEDURE — 1159F PR MEDICATION LIST DOCUMENTED IN MEDICAL RECORD: ICD-10-PCS | Mod: CPTII,95,, | Performed by: INTERNAL MEDICINE

## 2023-08-28 PROCEDURE — 1159F MED LIST DOCD IN RCRD: CPT | Mod: CPTII,95,, | Performed by: INTERNAL MEDICINE

## 2023-08-28 PROCEDURE — 99214 OFFICE O/P EST MOD 30 MIN: CPT | Mod: 25,95,, | Performed by: INTERNAL MEDICINE

## 2023-08-28 PROCEDURE — 3044F HG A1C LEVEL LT 7.0%: CPT | Mod: CPTII,95,, | Performed by: INTERNAL MEDICINE

## 2023-08-28 PROCEDURE — 3044F PR MOST RECENT HEMOGLOBIN A1C LEVEL <7.0%: ICD-10-PCS | Mod: CPTII,95,, | Performed by: INTERNAL MEDICINE

## 2023-08-28 PROCEDURE — 1160F RVW MEDS BY RX/DR IN RCRD: CPT | Mod: CPTII,95,, | Performed by: INTERNAL MEDICINE

## 2023-08-28 PROCEDURE — 95251 PR GLUCOSE MONITOR, 72 HOUR, PHYS INTERP: ICD-10-PCS | Mod: NDTC,S$GLB,, | Performed by: INTERNAL MEDICINE

## 2023-08-28 PROCEDURE — 4010F ACE/ARB THERAPY RXD/TAKEN: CPT | Mod: CPTII,95,, | Performed by: INTERNAL MEDICINE

## 2023-08-28 PROCEDURE — 1160F PR REVIEW ALL MEDS BY PRESCRIBER/CLIN PHARMACIST DOCUMENTED: ICD-10-PCS | Mod: CPTII,95,, | Performed by: INTERNAL MEDICINE

## 2023-08-28 RX ORDER — SEMAGLUTIDE 2.68 MG/ML
2 INJECTION, SOLUTION SUBCUTANEOUS
Qty: 3 EACH | Refills: 3 | Status: SHIPPED | OUTPATIENT
Start: 2023-08-28

## 2023-08-28 RX ORDER — BLOOD-GLUCOSE SENSOR
1 EACH MISCELLANEOUS
Qty: 3 EACH | Refills: 11 | Status: SHIPPED | OUTPATIENT
Start: 2023-08-28 | End: 2024-08-27

## 2023-08-28 NOTE — ASSESSMENT & PLAN NOTE
Dexcom with postprandial excursions while off Ozempic  Will send to mail order pharmacy. If unable to get try Mounjaro  Continue levemir 30 units, glimepiride 4 mg, metformin 1000 mg BID  Change to Dexcom G7. Discussed solutions for skin irritation  Labs before next visit    Discussed natural history of MAX. Over time anticipate may need prandial insulin at which time would get rid of glimepiride

## 2023-08-28 NOTE — PROGRESS NOTES
Filiberto Cohn is a 53 y.o. male with HTN, HLD presenting for follow-up of MAX treated as type 2    The patient location is: home in [place]  The chief complaint leading to consultation is:   Chief Complaint   Patient presents with    Diabetes       Visit type: audiovisual    Face to Face time with patient: 15 minutes  25 minutes of total time spent on the encounter, which includes face to face time and non-face to face time preparing to see the patient (eg, review of tests), Obtaining and/or reviewing separately obtained history, Documenting clinical information in the electronic or other health record, Independently interpreting results (not separately reported) and communicating results to the patient/family/caregiver, or Care coordination (not separately reported).    Each patient to whom he or she provides medical services by telemedicine is:  (1) informed of the relationship between the physician and patient and the respective role of any other health care provider with respect to management of the patient; and (2) notified that he or she may decline to receive medical services by telemedicine and may withdraw from such care at any time.    Previously seen by Dr. Cho, new to me      History of Present Illness  T2DM  Diagnosed around 2012  Known complications: denies    Seen by Dr. Cho in 2022 with A1c 11%. Labs with +NIKKO, detectable c-peptide. Started on basal insulin at that time. Also started on Dexcom. Has had great improvement    Unable to get ozempic recently with higher sugars after meals    Sister with recent diagnosis of MAX, thinks dad may have had it as well based on DM history  Component      Latest Ref Rng 9/15/2022   C-Peptide      0.78 - 5.19 ng/mL 1.77   Glucose 171   Glutamic Acid Decarb Ab      <=0.02 nmol/L 9.12 (H)         Current Diabetes Regimen:  Levemir 30 units  Ozempic   Glimepiride 4 mg daily  Metformin 1000 mg BID    Prior mediations tried:   Invokanna, Jardiance  -per patient not  "working.  Did help him in loosing weight and better glycemic control per patient.   Actos- nausea      Recent Hgb A1C:  Lab Results   Component Value Date    HGBA1C 6.8 (H) 07/14/2023       Glucose Monitoring:  Dexcom G6      Hypoglycemic Episodes: as above    Screening / DM Complications:    Nephropathy:  ACEi/ARB: Taking  Lab Results   Component Value Date    MICALBCREAT 6.6 12/12/2022       Last Lipid Panel:  Statin: Taking  Lab Results   Component Value Date    LDLCALC 81.4 09/15/2022       Last foot exam : 09/12/2022  Last eye exam : 07/14/2023;  no laser surgery or DR    B12:  Lab Results   Component Value Date    [id number] 442 11/12/2019         Current Outpatient Medications:     BD ULTRA-FINE KRUNAL PEN NEEDLE 32 gauge x 5/32" Ndle, At bed time, Disp: 100 each, Rfl: 3    blood-glucose sensor (DEXCOM G7 SENSOR) Angie, 1 each by Misc.(Non-Drug; Combo Route) route every 10 days., Disp: 3 each, Rfl: 11    dextroamphetamine-amphetamine (ADDERALL XR) 15 MG 24 hr capsule, Take 15 mg by mouth every morning., Disp: , Rfl: 0    fluticasone propionate (FLONASE) 50 mcg/actuation nasal spray, 1 spray by Each Nostril route once daily., Disp: , Rfl:     glimepiride (AMARYL) 4 MG tablet, Take 1 tablet (4 mg total) by mouth daily with breakfast., Disp: 90 tablet, Rfl: 3    insulin detemir U-100 (LEVEMIR FLEXTOUCH U-100 INSULN) 100 unit/mL (3 mL) InPn pen, Inject 26 Units into the skin every evening., Disp: 7.8 mL, Rfl: 11    losartan (COZAAR) 25 MG tablet, Take 1 tablet (25 mg total) by mouth once daily., Disp: 90 tablet, Rfl: 3    metFORMIN (GLUCOPHAGE) 500 MG tablet, Take 2 tablets (1,000 mg total) by mouth 2 (two) times daily with meals., Disp: 360 tablet, Rfl: 3    pravastatin (PRAVACHOL) 20 MG tablet, Take 1 tablet (20 mg total) by mouth every morning., Disp: 90 tablet, Rfl: 3    semaglutide (OZEMPIC) 2 mg/dose (8 mg/3 mL) PnIj, Inject 2 mg into the skin every 7 days., Disp: 3 each, Rfl: 3    ROS as above    Objective: "     Wt Readings from Last 3 Encounters:   07/14/23 87.8 kg (193 lb 9 oz)   12/12/22 86.1 kg (189 lb 14.8 oz)   09/12/22 84.9 kg (187 lb 4.5 oz)         LABS    Chemistry        Component Value Date/Time     01/31/2023 0801    K 4.3 01/31/2023 0801     01/31/2023 0801    CO2 27 01/31/2023 0801    BUN 12 01/31/2023 0801    CREATININE 0.8 01/31/2023 0801     (H) 01/31/2023 0801        Component Value Date/Time    CALCIUM 9.3 01/31/2023 0801    ALKPHOS 52 (L) 12/06/2021 0820    ALKPHOS 52 (L) 12/06/2021 0820    AST 18 12/06/2021 0820    AST 18 12/06/2021 0820    ALT 20 12/06/2021 0820    ALT 20 12/06/2021 0820    BILITOT 0.3 12/06/2021 0820    BILITOT 0.3 12/06/2021 0820    ESTGFRAFRICA >60.0 12/06/2021 0820    ESTGFRAFRICA >60.0 12/06/2021 0820    EGFRNONAA >60.0 12/06/2021 0820    EGFRNONAA >60.0 12/06/2021 0820              Assessment and Plan     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 If unable to get try Mounjaro  Continue levemir 30 units, glimepiride 4 mg, metformin 1000 mg BID  Change to Dexcom G7. Discussed solutions for skin irritation  Labs before next visit    Discussed natural history of MAX. Over time anticipate may need prandial insulin at which time would get rid of glimepiride    Essential hypertension  On losartan    Hyperlipidemia associated with type 2 diabetes mellitus  Cont statin  Update panel with next labs        RTC 4 months, labs before        Lena Cadena MD

## 2023-08-29 ENCOUNTER — PATIENT MESSAGE (OUTPATIENT)
Dept: ENDOCRINOLOGY | Facility: CLINIC | Age: 53
End: 2023-08-29
Payer: COMMERCIAL

## 2023-09-08 ENCOUNTER — CLINICAL SUPPORT (OUTPATIENT)
Dept: ENDOSCOPY | Facility: HOSPITAL | Age: 53
End: 2023-09-08
Attending: FAMILY MEDICINE
Payer: COMMERCIAL

## 2023-09-08 ENCOUNTER — TELEPHONE (OUTPATIENT)
Dept: ENDOSCOPY | Facility: HOSPITAL | Age: 53
End: 2023-09-08

## 2023-09-08 VITALS — HEIGHT: 75 IN | BODY MASS INDEX: 24 KG/M2 | WEIGHT: 193 LBS

## 2023-09-08 DIAGNOSIS — Z12.11 ENCOUNTER FOR COLORECTAL CANCER SCREENING: ICD-10-CM

## 2023-09-08 DIAGNOSIS — Z12.12 ENCOUNTER FOR COLORECTAL CANCER SCREENING: ICD-10-CM

## 2023-09-08 NOTE — TELEPHONE ENCOUNTER
Spoke to Pt to schedule procedure(s) Colonoscopy       Physician to perform procedure(s) Dr. CHRISTINE Gu  Date of Procedure (s) 12/1/23  Arrival Time 8:15 AM  Time of Procedure(s) 9:15 AM   Location of Procedure(s) [place] 4th Floor  Type of Rx Prep sent to patient: PEG - extended prep - Hx poor prep  Instructions provided to patient via [facility]    Patient was informed on the following information and verbalized understanding. Screening questionnaire reviewed with patient and complete. If procedure requires anesthesia, a responsible adult needs to be present to accompany the patient home, patient cannot drive after receiving anesthesia. Appointment details are tentative, especially check-in time. Patient will receive a prep-op call 4 days prior to confirm check-in time for procedure. If applicable the patient should contact their pharmacy to verify Rx for procedure prep is ready for pick-up. Patient was advised to call the scheduling department at [phone] if pharmacy states no Rx is available. Patient was advised to call the endoscopy scheduling department if any questions or concerns arise.      [facility] Scheduling Department

## 2023-09-08 NOTE — PLAN OF CARE
Spoke to Pt to schedule procedure(s) Colonoscopy       Physician to perform procedure(s) Dr. CHRISTINE Gu  Date of Procedure (s) 12/1/23  Arrival Time 8:15 AM  Time of Procedure(s) 9:15 AM   Location of Procedure(s) [place] 4th Floor  Type of Rx Prep sent to patient: PEG - extended prep - Hx poor prep  Instructions provided to patient via [facility]    Patient was informed on the following information and verbalized understanding. Screening questionnaire reviewed with patient and complete. If procedure requires anesthesia, a responsible adult needs to be present to accompany the patient home, patient cannot drive after receiving anesthesia. Appointment details are tentative, especially check-in time. Patient will receive a prep-op call 4 days prior to confirm check-in time for procedure. If applicable the patient should contact their pharmacy to verify Rx for procedure prep is ready for pick-up. Patient was advised to call the scheduling department at [phone] if pharmacy states no Rx is available. Patient was advised to call the endoscopy scheduling department if any questions or concerns arise.      [facility] Scheduling Department

## 2023-11-21 ENCOUNTER — OFFICE VISIT (OUTPATIENT)
Dept: URGENT CARE | Facility: CLINIC | Age: 53
End: 2023-11-21
Payer: COMMERCIAL

## 2023-11-21 VITALS
SYSTOLIC BLOOD PRESSURE: 129 MMHG | WEIGHT: 193 LBS | HEIGHT: 75 IN | BODY MASS INDEX: 24 KG/M2 | OXYGEN SATURATION: 97 % | HEART RATE: 102 BPM | RESPIRATION RATE: 18 BRPM | DIASTOLIC BLOOD PRESSURE: 87 MMHG | TEMPERATURE: 98 F

## 2023-11-21 DIAGNOSIS — J11.1 INFLUENZA: Primary | ICD-10-CM

## 2023-11-21 LAB
CTP QC/QA: YES
CTP QC/QA: YES
POC MOLECULAR INFLUENZA A AGN: POSITIVE
POC MOLECULAR INFLUENZA B AGN: NEGATIVE
SARS-COV-2 AG RESP QL IA.RAPID: NEGATIVE

## 2023-11-21 PROCEDURE — 99213 PR OFFICE/OUTPT VISIT, EST, LEVL III, 20-29 MIN: ICD-10-PCS | Mod: S$GLB,,, | Performed by: FAMILY MEDICINE

## 2023-11-21 PROCEDURE — 87502 INFLUENZA DNA AMP PROBE: CPT | Mod: QW,S$GLB,, | Performed by: FAMILY MEDICINE

## 2023-11-21 PROCEDURE — 87811 SARS CORONAVIRUS 2 ANTIGEN POCT, MANUAL READ: ICD-10-PCS | Mod: QW,S$GLB,, | Performed by: FAMILY MEDICINE

## 2023-11-21 PROCEDURE — 99213 OFFICE O/P EST LOW 20 MIN: CPT | Mod: S$GLB,,, | Performed by: FAMILY MEDICINE

## 2023-11-21 PROCEDURE — 87502 POCT INFLUENZA A/B MOLECULAR: ICD-10-PCS | Mod: QW,S$GLB,, | Performed by: FAMILY MEDICINE

## 2023-11-21 PROCEDURE — 87811 SARS-COV-2 COVID19 W/OPTIC: CPT | Mod: QW,S$GLB,, | Performed by: FAMILY MEDICINE

## 2023-11-21 RX ORDER — BENZONATATE 100 MG/1
100 CAPSULE ORAL EVERY 6 HOURS PRN
Qty: 30 CAPSULE | Refills: 1 | Status: SHIPPED | OUTPATIENT
Start: 2023-11-21 | End: 2024-01-05

## 2023-11-21 RX ORDER — OSELTAMIVIR PHOSPHATE 75 MG/1
75 CAPSULE ORAL 2 TIMES DAILY
Qty: 10 CAPSULE | Refills: 0 | Status: SHIPPED | OUTPATIENT
Start: 2023-11-21 | End: 2023-11-26

## 2023-11-21 NOTE — PROGRESS NOTES
"Subjective:      Patient ID: Filiberto Cohn is a 53 y.o. male.    Vitals:  height is 6' 3" (1.905 m) and weight is 87.5 kg (193 lb). His oral temperature is 98.3 °F (36.8 °C). His blood pressure is 129/87 and his pulse is 102. His respiration is 18 and oxygen saturation is 97%.     Chief Complaint: Fever    This is a 53 y.o. male who presents today with a chief complaint of  fever cough, congestion, body aches, runny nose  - started 2 days ago     Fever   This is a new problem. The maximum temperature noted was 99 to 99.9 F. Associated symptoms include congestion, coughing, headaches, muscle aches and sleepiness. Pertinent negatives include no abdominal pain, chest pain, diarrhea, nausea, rash, sore throat, urinary pain, vomiting or wheezing. He has tried NSAIDs and acetaminophen for the symptoms.     Constitution: Positive for fever.   HENT:  Positive for congestion. Negative for sore throat.    Cardiovascular:  Negative for chest pain.   Respiratory:  Positive for cough. Negative for wheezing.    Gastrointestinal:  Negative for abdominal pain, nausea, vomiting and diarrhea.   Genitourinary:  Negative for dysuria.   Skin:  Negative for rash.   Neurological:  Positive for headaches.      Objective:     Physical Exam   Constitutional: He appears ill. No distress. normal  HENT:   Head: Normocephalic and atraumatic.   Nose: Congestion present.   Mouth/Throat: Mucous membranes are moist. Posterior oropharyngeal erythema present. No oropharyngeal exudate.   Eyes: Pupils are equal, round, and reactive to light. Extraocular movement intact   Neck: Neck supple.   Cardiovascular: Normal rate, regular rhythm, normal heart sounds and normal pulses.   Pulmonary/Chest: Effort normal and breath sounds normal.   Abdominal: Normal appearance. Soft.   Neurological: He is alert.   Skin: Skin is diaphoretic.   Nursing note and vitals reviewed.    Results for orders placed or performed in visit on 11/21/23   SARS Coronavirus 2 Antigen, " POCT Manual Read   Result Value Ref Range    SARS Coronavirus 2 Antigen Negative Negative     Acceptable Yes    POCT Influenza A/B MOLECULAR   Result Value Ref Range    POC Molecular Influenza A Ag Positive (A) Negative, Not Reported    POC Molecular Influenza B Ag Negative Negative, Not Reported     Acceptable Yes       Assessment:     1. Influenza        Plan:       Influenza  -     SARS Coronavirus 2 Antigen, POCT Manual Read  -     POCT Influenza A/B MOLECULAR  -     oseltamivir (TAMIFLU) 75 MG capsule; Take 1 capsule (75 mg total) by mouth 2 (two) times daily. for 5 days  Dispense: 10 capsule; Refill: 0  -     benzonatate (TESSALON PERLES) 100 MG capsule; Take 1 capsule (100 mg total) by mouth every 6 (six) hours as needed for Cough.  Dispense: 30 capsule; Refill: 1

## 2023-12-01 ENCOUNTER — HOSPITAL ENCOUNTER (OUTPATIENT)
Facility: HOSPITAL | Age: 53
Discharge: HOME | End: 2023-12-01
Attending: COLON & RECTAL SURGERY | Admitting: COLON & RECTAL SURGERY
Payer: COMMERCIAL

## 2023-12-01 ENCOUNTER — ANESTHESIA (OUTPATIENT)
Dept: ENDOSCOPY | Facility: HOSPITAL | Age: 53
End: 2023-12-01
Payer: COMMERCIAL

## 2023-12-01 ENCOUNTER — ANESTHESIA EVENT (OUTPATIENT)
Dept: ENDOSCOPY | Facility: HOSPITAL | Age: 53
End: 2023-12-01
Payer: COMMERCIAL

## 2023-12-01 VITALS
OXYGEN SATURATION: 99 % | HEART RATE: 90 BPM | BODY MASS INDEX: 22.75 KG/M2 | HEIGHT: 75 IN | TEMPERATURE: 98 F | WEIGHT: 183 LBS | DIASTOLIC BLOOD PRESSURE: 50 MMHG | RESPIRATION RATE: 18 BRPM | SYSTOLIC BLOOD PRESSURE: 88 MMHG

## 2023-12-01 DIAGNOSIS — Z12.11 ENCOUNTER FOR SCREENING COLONOSCOPY: ICD-10-CM

## 2023-12-01 LAB — POCT GLUCOSE: 93 MG/DL (ref 70–110)

## 2023-12-01 PROCEDURE — 37000008 HC ANESTHESIA 1ST 15 MINUTES: Performed by: COLON & RECTAL SURGERY

## 2023-12-01 PROCEDURE — G0121 COLON CA SCRN NOT HI RSK IND: HCPCS | Performed by: COLON & RECTAL SURGERY

## 2023-12-01 PROCEDURE — E9220 PRA ENDO ANESTHESIA: HCPCS | Mod: ,,, | Performed by: REGISTERED NURSE

## 2023-12-01 PROCEDURE — 37000009 HC ANESTHESIA EA ADD 15 MINS: Performed by: COLON & RECTAL SURGERY

## 2023-12-01 PROCEDURE — G0121 COLON CA SCRN NOT HI RSK IND: ICD-10-PCS | Mod: ,,, | Performed by: COLON & RECTAL SURGERY

## 2023-12-01 PROCEDURE — G0121 COLON CA SCRN NOT HI RSK IND: HCPCS | Mod: ,,, | Performed by: COLON & RECTAL SURGERY

## 2023-12-01 PROCEDURE — 25000003 PHARM REV CODE 250: Performed by: REGISTERED NURSE

## 2023-12-01 PROCEDURE — E9220 PRA ENDO ANESTHESIA: ICD-10-PCS | Mod: ,,, | Performed by: REGISTERED NURSE

## 2023-12-01 PROCEDURE — 63600175 PHARM REV CODE 636 W HCPCS: Performed by: REGISTERED NURSE

## 2023-12-01 RX ORDER — PROPOFOL 10 MG/ML
INJECTION, EMULSION INTRAVENOUS CONTINUOUS PRN
Status: DISCONTINUED | OUTPATIENT
Start: 2023-12-01 | End: 2023-12-01

## 2023-12-01 RX ORDER — SODIUM CHLORIDE 9 MG/ML
INJECTION, SOLUTION INTRAVENOUS CONTINUOUS
Status: DISCONTINUED | OUTPATIENT
Start: 2023-12-01 | End: 2023-12-01 | Stop reason: HOSPADM

## 2023-12-01 RX ORDER — PROPOFOL 10 MG/ML
VIAL (ML) INTRAVENOUS
Status: DISCONTINUED | OUTPATIENT
Start: 2023-12-01 | End: 2023-12-01

## 2023-12-01 RX ORDER — LIDOCAINE HYDROCHLORIDE 20 MG/ML
INJECTION INTRAVENOUS
Status: DISCONTINUED | OUTPATIENT
Start: 2023-12-01 | End: 2023-12-01

## 2023-12-01 RX ADMIN — PROPOFOL 80 MG: 10 INJECTION, EMULSION INTRAVENOUS at 08:12

## 2023-12-01 RX ADMIN — PROPOFOL 175 MCG/KG/MIN: 10 INJECTION, EMULSION INTRAVENOUS at 08:12

## 2023-12-01 RX ADMIN — LIDOCAINE HYDROCHLORIDE 40 MG: 20 INJECTION INTRAVENOUS at 08:12

## 2023-12-01 RX ADMIN — SODIUM CHLORIDE: 0.9 INJECTION, SOLUTION INTRAVENOUS at 08:12

## 2023-12-01 NOTE — PLAN OF CARE
Patient tolerated procedure without incident. Will transfer to recovery accompanied by CRNA with side rails up x2. Patient's belongings under stretcher.

## 2023-12-01 NOTE — ANESTHESIA POSTPROCEDURE EVALUATION
Anesthesia Post Evaluation    Patient: Filiberto Cohn    Procedure(s) Performed: Procedure(s) (LRB):  COLONOSCOPY (N/A)    Final Anesthesia Type: general      Patient location during evaluation: GI PACU  Patient participation: Yes- Able to Participate  Level of consciousness: awake and alert  Post-procedure vital signs: reviewed and stable  Pain management: adequate  Airway patency: patent    PONV status at discharge: No PONV  Anesthetic complications: no      Cardiovascular status: blood pressure returned to baseline  Respiratory status: unassisted  Hydration status: euvolemic  Follow-up not needed.              Vitals Value Taken Time   BP 88/50 12/01/23 0945   Temp 36.6 °C (97.9 °F) 12/01/23 0930   Pulse 90 12/01/23 0945   Resp 18 12/01/23 0945   SpO2 99 % 12/01/23 0945         No case tracking events are documented in the log.      Pain/Ana Score: Ana Score: 10 (12/1/2023  9:43 AM)

## 2023-12-01 NOTE — ANESTHESIA PREPROCEDURE EVALUATION
12/01/2023  Filiberto Cohn is a 53 y.o., male.  [facility]  Anesthesia Pre-Operative Evaluation       Patient Name: Filiberto Cohn  YOB: 1970  MRN: [id number]  CSN: [id number]      Code Status: Full Code   Date of Procedure: 12/1/2023  Anesthesia: Choice Procedure: Procedure(s) (LRB):  COLONOSCOPY (N/A)  Pre-Operative Diagnosis: Encounter for colorectal cancer screening [Z12.11, Z12.12]  Proceduralist: Surgeon(s) and Role:     * CHRISTINE Gu MD - Primary        SUBJECTIVE:   Filiberto Cohn is a 53 y.o. male who  has a past medical history of Diabetes mellitus type II, Hyperlipidemia, and Hypertension. No notes on file    Last took his ozempic 1.5 week ago. Completed his prep at 3am this morning. Nauseous at the time, but no longer nauseous on my evaluation   Anticoagulants   Medication Route Frequency       he has a current medication list which includes the following long-term medication(s): dextroamphetamine-amphetamine, glimepiride, levemir flextouch u100 insulin, losartan, metformin, pravastatin, and dexcom g7 sensor.   ALLERGIES:   Review of patient's allergies indicates:  No Known Allergies  LDA:          Lines/Drains/Airways       Peripheral Intravenous Line  Duration                  Peripheral IV - Single Lumen 12/01/23 0826 22 G Posterior;Right Hand <1 day                  MEDICATIONS:     Antibiotics (From admission, onward)      None          VTE Risk Mitigation (From admission, onward)           Ordered     IP VTE LOW RISK PATIENT  Once         12/01/23 0829     Place sequential compression device  Until discontinued         12/01/23 0829                  Current Facility-Administered Medications   Medication Dose Route Frequency Provider Last Rate Last Admin    0.9%  NaCl infusion   Intravenous Continuous Cherry Tesfaye MD        0.9%  NaCl infusion    Intravenous Continuous CHRISTINE Gu MD              History:   There are no hospital problems to display for this patient.    Surgical History:    has a past surgical history that includes Trigger finger release (Left) and Colonoscopy (N/A, 3/29/2022).   Social History:    has no history on file for sexual activity.  reports that he has never smoked. He has never used smokeless tobacco. He reports current alcohol use. He reports that he does not use drugs.     OBJECTIVE:     Vital Signs (Most Recent):  Temp: 36.7 °C (98.1 °F) (12/01/23 0825)  Pulse: 87 (12/01/23 0825)  Resp: 18 (12/01/23 0825)  BP: 135/89 (12/01/23 0825)  SpO2: 100 % (12/01/23 0825) Vital Signs Range (Last 24H):  Temp:  [36.7 °C (98.1 °F)]   Pulse:  [87]   Resp:  [18]   BP: (135)/(89)   SpO2:  [100 %]        Body mass index is 22.87 kg/m².   Wt Readings from Last 4 Encounters:   12/01/23 83 kg (183 lb)   11/21/23 87.5 kg (193 lb)   09/08/23 87.5 kg (193 lb)   07/14/23 87.8 kg (193 lb 9 oz)     Significant Labs:  Lab Results   Component Value Date    WBC 5.41 12/06/2021    HGB 11.9 (L) 12/06/2021    HCT 38.2 (L) 12/06/2021     12/06/2021     01/31/2023    K 4.3 01/31/2023     01/31/2023    CREATININE 0.8 01/31/2023    BUN 12 01/31/2023    CO2 27 01/31/2023     (H) 01/31/2023    CALCIUM 9.3 01/31/2023    PHOS 3.3 01/31/2023    ALKPHOS 52 (L) 12/06/2021    ALKPHOS 52 (L) 12/06/2021    ALT 20 12/06/2021    ALT 20 12/06/2021    AST 18 12/06/2021    AST 18 12/06/2021    ALBUMIN 4.0 01/31/2023    HGBA1C 6.8 (H) 07/14/2023     No LMP for male patient.  No results found for this or any previous visit (from the past 72 hour(s)).    EKG:   Results for orders placed or performed during the hospital encounter of 03/12/15   EKG 12-lead    Collection Time: 03/12/15 12:00 PM    Narrative    Test Reason : 250.00  Blood Pressure :   Vent. Rate : 084 BPM     Atrial Rate : 084 BPM     P-R Int : 178 ms          QRS Dur : 094 ms      QT Int  ": 380 ms       P-R-T Axes : 051 004 043 degrees     QTc Int : 449 ms    Normal sinus rhythm  Normal ECG  No previous ECGs available  Confirmed by ASHVIN ALEGRIA MD ([id number]) on 3/12/2015 12:15:04 PM    Referred By: DARCY CORDERO           Confirmed By:ASHVIN ALEGRIA MD        TTE:  No results found for this or any previous visit.  No results found for: "EF"   No results found for this or any previous visit.  KWASI:  No results found for this or any previous visit.  Stress Test:  No results found for this or any previous visit.     LHC:  No results found for this or any previous visit.     PFT:  No results found for: "FEV1", "FVC", "EBH6VAD", "TLC", "DLCO"   ASSESSMENT/PLAN:        Pre-op Assessment    I have reviewed the Patient Summary Reports.    I have reviewed the NPO Status.   I have reviewed the Medications.     Review of Systems  Anesthesia Hx:  No problems with previous Anesthesia                Social:  Non-Smoker, No Alcohol Use       Cardiovascular:  Exercise tolerance: good   Hypertension                                        Pulmonary:  Pulmonary Normal                       Renal/:  Renal/ Normal                 Hepatic/GI:  Hepatic/GI Normal                 Endocrine:  Diabetes           Psych:  Psychiatric History                  Physical Exam  General: Well nourished, Cooperative, Alert and Oriented    Airway:  Mallampati: II   Mouth Opening: Normal  TM Distance: Normal  Tongue: Normal  Neck ROM: Normal ROM    Dental:  Intact    Chest/Lungs:  Clear to auscultation, Normal Respiratory Rate    Heart:  Rate: Normal  Rhythm: Regular Rhythm      Anesthesia Plan  Type of Anesthesia, risks & benefits discussed:    Anesthesia Type: Gen ETT, Gen Natural Airway  Intra-op Monitoring Plan: Standard ASA Monitors  Post Op Pain Control Plan: multimodal analgesia  Induction:  IV  ASA Score: 2  Day of Surgery Review of History & Physical: H&P Update referred to the surgeon/provider.I have interviewed and " examined the patient. I have reviewed the patient's H&P dated:     Ready For Surgery From Anesthesia Perspective.     .

## 2023-12-01 NOTE — TRANSFER OF CARE
"Anesthesia Transfer of Care Note    Patient: Filiberto Cohn    Procedure(s) Performed: Procedure(s) (LRB):  COLONOSCOPY (N/A)    Patient location: GI    Anesthesia Type: general    Transport from OR: Transported from OR on room air with adequate spontaneous ventilation    Post pain: adequate analgesia    Post assessment: no apparent anesthetic complications and tolerated procedure well    Post vital signs: stable    Level of consciousness: sedated    Nausea/Vomiting: no nausea/vomiting    Complications: none    Transfer of care protocol was followedComments: Nurse at bedside, VSS, spont reg resp noted    Last vitals: Visit Vitals  BP (!) 86/50   Pulse 104   Temp 36.7 °C (98.1 °F) (Temporal)   Resp 17   Ht 6' 3" (1.905 m)   Wt 83 kg (183 lb)   SpO2 98%   BMI 22.87 kg/m²     "

## 2023-12-01 NOTE — H&P
Colonoscopy History and Physical      Procedure : Colonoscopy    Indications:  asymptomatic screening exam    Family Hx of CRC: none    Last Colonoscopy:  2022  Findings: poor prep, no specimens collected, repeat in 1 yr    Hx of sedation problems: none  FHX of sedation problems: none    Past Medical History:   Diagnosis Date    Diabetes mellitus type II     Hyperlipidemia     Hypertension        Family History   Problem Relation Age of Onset    Hypertension Mother     Cataracts Mother     Diabetes Mother     No Known Problems Father     Diabetes Sister     Hypertension Sister     No Known Problems Brother     No Known Problems Maternal Aunt     No Known Problems Maternal Uncle     No Known Problems Paternal Aunt     No Known Problems Paternal Uncle     Diabetes Maternal Grandmother     Cancer Maternal Grandmother     Diabetes Maternal Grandfather     Stroke Maternal Grandfather     Diabetes Paternal Grandmother     Diabetes Paternal Grandfather     Amblyopia Neg Hx     Blindness Neg Hx     Glaucoma Neg Hx     Macular degeneration Neg Hx     Retinal detachment Neg Hx     Strabismus Neg Hx     Thyroid disease Neg Hx        Social History     Socioeconomic History    Marital status:    Tobacco Use    Smoking status: Never    Smokeless tobacco: Never   Substance and Sexual Activity    Alcohol use: Yes     Comment: once weekly    Drug use: Never   Social History Narrative    July 2016    He is an Religion     , his wife is an artist and also works at [employer] as a     Both of their children, son and daughter are thriving    They moved to [place] in order for him to accept this Congregation in 2009 from [place].    He is happy professionally and personally.  His children are happy in school at [employer].    They enjoy the community of their Congregation, there school and the large community.        Does not exercise.      Social Determinants of  "Health     Financial Resource Strain: Low Risk  (8/27/2023)    Overall Financial Resource Strain (CARDIA)     Difficulty of Paying Living Expenses: Not hard at all   Food Insecurity: No Food Insecurity (8/27/2023)    Hunger Vital Sign     Worried About Running Out of Food in the Last Year: Never true     Ran Out of Food in the Last Year: Never true   Transportation Needs: No Transportation Needs (8/27/2023)    PRAPARE - Transportation     Lack of Transportation (Medical): No     Lack of Transportation (Non-Medical): No   Physical Activity: Insufficiently Active (8/27/2023)    Exercise Vital Sign     Days of Exercise per Week: 1 day     Minutes of Exercise per Session: 50 min   Stress: No Stress Concern Present (8/27/2023)    [place] [employer] - Occupational Stress Questionnaire     Feeling of Stress : Only a little   Social Connections: Unknown (8/27/2023)    Social Connection and Isolation Panel [NHANES]     Frequency of Communication with Friends and Family: More than three times a week     Frequency of Social Gatherings with Friends and Family: More than three times a week     Active Member of Clubs or Organizations: Yes     Attends Club or Organization Meetings: More than 4 times per year     Marital Status:    Housing Stability: Low Risk  (8/27/2023)    Housing Stability Vital Sign     Unable to Pay for Housing in the Last Year: No     Number of Places Lived in the Last Year: 1     Unstable Housing in the Last Year: No       Review of patient's allergies indicates:  No Known Allergies    No current facility-administered medications on file prior to encounter.     Current Outpatient Medications on File Prior to Encounter   Medication Sig Dispense Refill    BD ULTRA-FINE KRUNAL PEN NEEDLE 32 gauge x 5/32" Ndle At bed time 100 each 3    blood-glucose sensor (DEXCOM G7 SENSOR) Angie 1 each by Misc.(Non-Drug; Combo Route) route every 10 days. 3 each 11    dextroamphetamine-amphetamine " (ADDERALL XR) 15 MG 24 hr capsule Take 15 mg by mouth every morning.  0    fluticasone propionate (FLONASE) 50 mcg/actuation nasal spray 1 spray by Each Nostril route once daily.      glimepiride (AMARYL) 4 MG tablet Take 1 tablet (4 mg total) by mouth daily with breakfast. 90 tablet 3    insulin detemir U-100 (LEVEMIR FLEXTOUCH U-100 INSULN) 100 unit/mL (3 mL) InPn pen Inject 26 Units into the skin every evening. 7.8 mL 11    losartan (COZAAR) 25 MG tablet Take 1 tablet (25 mg total) by mouth once daily. 90 tablet 3    metFORMIN (GLUCOPHAGE) 500 MG tablet Take 2 tablets (1,000 mg total) by mouth 2 (two) times daily with meals. 360 tablet 3    pravastatin (PRAVACHOL) 20 MG tablet Take 1 tablet (20 mg total) by mouth every morning. 90 tablet 3    semaglutide (OZEMPIC) 2 mg/dose (8 mg/3 mL) PnIj Inject 2 mg into the skin every 7 days. 3 each 3       Review of Systems -   Respiratory ROS: negative  Cardiovascular ROS: negative  Gastrointestinal ROS: negative  Musculoskeletal ROS: negative  Neurological ROS: negative        Physical Exam:  General: no distress  Head: normocephalic  Lungs:  normal respiratory effort  Heart: regular rate  Abdomen: soft,  Non-tender  Extremities: warm and well perfused       Deep Sedation: Mallampati Score per anesthesia    ASA: II    Plan: proceed with screening colonoscopy    Patient cleared for Anesthesia:  MAC    Anesthesia/Surgery risks, benefits, and alternative options discussed and understood by patient/family.  Cherry Tesfaye MD  [facility]  General Surgery PGY-1

## 2023-12-01 NOTE — PROVATION PATIENT INSTRUCTIONS
Discharge Summary/Instructions after an Endoscopic Procedure  Patient Name: Filiberto Cohn  Patient MRN: [id number]  Patient YOB: 1970 Friday, December 1, 2023  Uriel Gu MD  Dear patient,  As a result of recent federal legislation (The Federal Cures Act), you may   receive lab or pathology results from your procedure in your [facility]   account before your physician is able to contact you. Your physician or   their representative will relay the results to you with their   recommendations at their soonest availability.  Thank you,  RESTRICTIONS:  During your procedure today, you received medications for sedation.  These   medications may affect your judgment, balance and coordination.  Therefore,   for 24 hours, you have the following restrictions:   - DO NOT drive a car, operate machinery, make legal/financial decisions,   sign important papers or drink alcohol.    ACTIVITY:  Today: no heavy lifting, straining or running due to procedural   sedation/anesthesia.  The following day: return to full activity including work.  DIET:  Eat and drink normally unless instructed otherwise.     TREATMENT FOR COMMON SIDE EFFECTS:  - Mild abdominal pain, nausea, belching, bloating or excessive gas:  rest,   eat lightly and use a heating pad.  - Sore Throat: treat with throat lozenges and/or gargle with warm salt   water.  - Because air was used during the procedure, expelling large amounts of air   from your rectum or belching is normal.  - If a bowel prep was taken, you may not have a bowel movement for 1-3 days.    This is normal.  SYMPTOMS TO WATCH FOR AND REPORT TO YOUR PHYSICIAN:  1. Abdominal pain or bloating, other than gas cramps.  2. Chest pain.  3. Back pain.  4. Signs of infection such as: chills or fever occurring within 24 hours   after the procedure.  5. Rectal bleeding, which would show as bright red, maroon, or black stools.   (A tablespoon of blood from the rectum is not serious, especially if    hemorrhoids are present.)  6. Vomiting.  7. Weakness or dizziness.  GO DIRECTLY TO THE NEAREST EMERGENCY ROOM IF YOU HAVE ANY OF THE FOLLOWING:      Difficulty breathing              Chills and/or fever over 101 F   Persistent vomiting and/or vomiting blood   Severe abdominal pain   Severe chest pain   Black, tarry stools   Bleeding- more than one tablespoon   Any other symptom or condition that you feel may need urgent attention  Your doctor recommends these additional instructions:  If any biopsies were taken, your doctors clinic will contact you in 1 to 2   weeks with any results.  - Discharge patient to home (ambulatory).   - Patient has a contact number available for emergencies.  The signs and   symptoms of potential delayed complications were discussed with the   patient.  Return to normal activities tomorrow.  Written discharge   instructions were provided to the patient.   - Resume previous diet.   - Continue present medications.   - Repeat colonoscopy in 10 years for screening purposes.  For questions, problems or results please call your physician - Uriel Gu MD at Work:  [phone].  [facility], EMERGENCY ROOM PHONE NUMBER: [phone]  IF A COMPLICATION OR EMERGENCY SITUATION ARISES AND YOU ARE UNABLE TO REACH   YOUR PHYSICIAN - GO DIRECTLY TO THE EMERGENCY ROOM.  Uriel Gu MD  12/1/2023 9:30:00 AM  This report has been verified and signed electronically.  Dear patient,  As a result of recent federal legislation (The Federal Cures Act), you may   receive lab or pathology results from your procedure in your [facility]   account before your physician is able to contact you. Your physician or   their representative will relay the results to you with their   recommendations at their soonest availability.  Thank you,  PROVATION

## 2023-12-20 ENCOUNTER — PATIENT MESSAGE (OUTPATIENT)
Dept: INTERNAL MEDICINE | Facility: CLINIC | Age: 53
End: 2023-12-20
Payer: COMMERCIAL

## 2023-12-20 DIAGNOSIS — I10 ESSENTIAL HYPERTENSION: ICD-10-CM

## 2023-12-20 RX ORDER — LOSARTAN POTASSIUM 25 MG/1
25 TABLET ORAL DAILY
Qty: 90 TABLET | Refills: 3 | Status: SHIPPED | OUTPATIENT
Start: 2023-12-20

## 2024-01-02 ENCOUNTER — LAB VISIT (OUTPATIENT)
Dept: LAB | Facility: HOSPITAL | Age: 54
End: 2024-01-02
Attending: INTERNAL MEDICINE
Payer: COMMERCIAL

## 2024-01-02 DIAGNOSIS — E13.9 LADA (LATENT AUTOIMMUNE DIABETES IN ADULTS), MANAGED AS TYPE 2: ICD-10-CM

## 2024-01-02 LAB
ALBUMIN SERPL BCP-MCNC: 4.1 G/DL (ref 3.5–5.2)
ALP SERPL-CCNC: 45 U/L (ref 55–135)
ALT SERPL W/O P-5'-P-CCNC: 16 U/L (ref 10–44)
ANION GAP SERPL CALC-SCNC: 11 MMOL/L (ref 8–16)
AST SERPL-CCNC: 15 U/L (ref 10–40)
BILIRUB SERPL-MCNC: 0.3 MG/DL (ref 0.1–1)
BUN SERPL-MCNC: 13 MG/DL (ref 6–20)
CALCIUM SERPL-MCNC: 10 MG/DL (ref 8.7–10.5)
CHLORIDE SERPL-SCNC: 100 MMOL/L (ref 95–110)
CHOLEST SERPL-MCNC: 131 MG/DL (ref 120–199)
CHOLEST/HDLC SERPL: 3.2 {RATIO} (ref 2–5)
CO2 SERPL-SCNC: 26 MMOL/L (ref 23–29)
CREAT SERPL-MCNC: 0.8 MG/DL (ref 0.5–1.4)
EST. GFR  (NO RACE VARIABLE): >60 ML/MIN/1.73 M^2
ESTIMATED AVG GLUCOSE: 151 MG/DL (ref 68–131)
GLUCOSE SERPL-MCNC: 152 MG/DL (ref 70–110)
HBA1C MFR BLD: 6.9 % (ref 4–5.6)
HDLC SERPL-MCNC: 41 MG/DL (ref 40–75)
HDLC SERPL: 31.3 % (ref 20–50)
LDLC SERPL CALC-MCNC: 73 MG/DL (ref 63–159)
NONHDLC SERPL-MCNC: 90 MG/DL
POTASSIUM SERPL-SCNC: 4.9 MMOL/L (ref 3.5–5.1)
PROT SERPL-MCNC: 8.3 G/DL (ref 6–8.4)
SODIUM SERPL-SCNC: 137 MMOL/L (ref 136–145)
TRIGL SERPL-MCNC: 85 MG/DL (ref 30–150)
TSH SERPL DL<=0.005 MIU/L-ACNC: 1.33 UIU/ML (ref 0.4–4)
VIT B12 SERPL-MCNC: 311 PG/ML (ref 210–950)

## 2024-01-02 PROCEDURE — 80061 LIPID PANEL: CPT | Performed by: INTERNAL MEDICINE

## 2024-01-02 PROCEDURE — 82607 VITAMIN B-12: CPT | Performed by: INTERNAL MEDICINE

## 2024-01-02 PROCEDURE — 36415 COLL VENOUS BLD VENIPUNCTURE: CPT | Performed by: INTERNAL MEDICINE

## 2024-01-02 PROCEDURE — 83036 HEMOGLOBIN GLYCOSYLATED A1C: CPT | Performed by: INTERNAL MEDICINE

## 2024-01-02 PROCEDURE — 84443 ASSAY THYROID STIM HORMONE: CPT | Performed by: INTERNAL MEDICINE

## 2024-01-02 PROCEDURE — 80053 COMPREHEN METABOLIC PANEL: CPT | Performed by: INTERNAL MEDICINE

## 2024-01-05 ENCOUNTER — OFFICE VISIT (OUTPATIENT)
Dept: ENDOCRINOLOGY | Facility: CLINIC | Age: 54
End: 2024-01-05
Payer: COMMERCIAL

## 2024-01-05 VITALS
SYSTOLIC BLOOD PRESSURE: 126 MMHG | HEIGHT: 75 IN | BODY MASS INDEX: 23.17 KG/M2 | DIASTOLIC BLOOD PRESSURE: 80 MMHG | OXYGEN SATURATION: 97 % | WEIGHT: 186.31 LBS | HEART RATE: 78 BPM

## 2024-01-05 DIAGNOSIS — I10 ESSENTIAL HYPERTENSION: ICD-10-CM

## 2024-01-05 DIAGNOSIS — E78.5 HYPERLIPIDEMIA ASSOCIATED WITH TYPE 2 DIABETES MELLITUS: ICD-10-CM

## 2024-01-05 DIAGNOSIS — E11.69 HYPERLIPIDEMIA ASSOCIATED WITH TYPE 2 DIABETES MELLITUS: ICD-10-CM

## 2024-01-05 DIAGNOSIS — E13.9 LADA (LATENT AUTOIMMUNE DIABETES IN ADULTS), MANAGED AS TYPE 2: Primary | ICD-10-CM

## 2024-01-05 PROCEDURE — 99214 OFFICE O/P EST MOD 30 MIN: CPT | Mod: 25,S$GLB,, | Performed by: INTERNAL MEDICINE

## 2024-01-05 PROCEDURE — 95251 CONT GLUC MNTR ANALYSIS I&R: CPT | Mod: S$GLB,,, | Performed by: INTERNAL MEDICINE

## 2024-01-05 PROCEDURE — 3044F HG A1C LEVEL LT 7.0%: CPT | Mod: CPTII,S$GLB,, | Performed by: INTERNAL MEDICINE

## 2024-01-05 PROCEDURE — 3079F DIAST BP 80-89 MM HG: CPT | Mod: CPTII,S$GLB,, | Performed by: INTERNAL MEDICINE

## 2024-01-05 PROCEDURE — 1159F MED LIST DOCD IN RCRD: CPT | Mod: CPTII,S$GLB,, | Performed by: INTERNAL MEDICINE

## 2024-01-05 PROCEDURE — 3074F SYST BP LT 130 MM HG: CPT | Mod: CPTII,S$GLB,, | Performed by: INTERNAL MEDICINE

## 2024-01-05 PROCEDURE — 1160F RVW MEDS BY RX/DR IN RCRD: CPT | Mod: CPTII,S$GLB,, | Performed by: INTERNAL MEDICINE

## 2024-01-05 PROCEDURE — 99999 PR PBB SHADOW E&M-EST. PATIENT-LVL IV: CPT | Mod: PBBFAC,,, | Performed by: INTERNAL MEDICINE

## 2024-01-05 PROCEDURE — 3008F BODY MASS INDEX DOCD: CPT | Mod: CPTII,S$GLB,, | Performed by: INTERNAL MEDICINE

## 2024-01-05 RX ORDER — INSULIN LISPRO-AABC 100 [IU]/ML
6 INJECTION, SOLUTION SUBCUTANEOUS
Qty: 3 PEN | Refills: 11 | Status: SHIPPED | OUTPATIENT
Start: 2024-01-05 | End: 2025-01-04

## 2024-01-05 RX ORDER — INSULIN GLARGINE 300 U/ML
30 INJECTION, SOLUTION SUBCUTANEOUS DAILY
Qty: 9 ML | Refills: 3 | Status: SHIPPED | OUTPATIENT
Start: 2024-01-05 | End: 2025-01-04

## 2024-01-05 NOTE — PROGRESS NOTES
"Filiberto Cohn is a 53 y.o. male with HTN, HLD presenting for follow-up of MAX treated as type 2      History of Present Illness  T2DM  Diagnosed around 2012  Known complications: denies    Seen by Dr. Cho in 2022 with A1c 11%. Labs with +NIKKO, detectable c-peptide. Started on basal insulin at that time. Also started on Dexcom. Has had great improvement    At last visit had been off ozempic but now back on with significant improvement in control however still finds that he is running high after meals particularly when eating carbs  Following overall healthy diet and has maintained weight loss now but struggling with control    Sister with also now with diagnosis of MAX, thinks dad may have had it as well based on DM history  Component      Latest Ref Rng 9/15/2022   C-Peptide      0.78 - 5.19 ng/mL 1.77   Glucose 171   Glutamic Acid Decarb Ab      <=0.02 nmol/L 9.12 (H)           Current Diabetes Regimen:  Levemir 34 units  Ozempic 2 mg weekly  Glimepiride 4 mg daily  Metformin 1000 mg BID    Prior mediations tried:  Invokana, Jardiance  - Jardiance led to really frequent urination which was interruptive   Actos- nausea      Recent Hgb A1C:  Lab Results   Component Value Date    HGBA1C 6.9 (H) 01/02/2024       Glucose Monitoring:  Dexcom G7      Hypoglycemic Episodes: as above    Screening / DM Complications:    Nephropathy:  ACEi/ARB: Taking  Lab Results   Component Value Date    MICALBCREAT 6.6 12/12/2022       Last Lipid Panel:  Statin: Taking  Lab Results   Component Value Date    LDLCALC 73.0 01/02/2024       Last foot exam : 09/12/2022  Last eye exam : 07/14/2023;  no laser surgery or DR    B12:  Lab Results   Component Value Date    RYHIRYII90 311 01/02/2024         Current Outpatient Medications:     BD ULTRA-FINE KRUNAL PEN NEEDLE 32 gauge x 5/32" Ndle, At bed time, Disp: 100 each, Rfl: 3    blood-glucose sensor (DEXCOM G7 SENSOR) Angie, 1 each by Misc.(Non-Drug; Combo Route) route every 10 days., Disp: 3 " each, Rfl: 11    dextroamphetamine-amphetamine (ADDERALL XR) 15 MG 24 hr capsule, Take 15 mg by mouth every morning., Disp: , Rfl: 0    fluticasone propionate (FLONASE) 50 mcg/actuation nasal spray, 1 spray by Each Nostril route once daily., Disp: , Rfl:     losartan (COZAAR) 25 MG tablet, Take 1 tablet (25 mg total) by mouth once daily., Disp: 90 tablet, Rfl: 3    metFORMIN (GLUCOPHAGE) 500 MG tablet, Take 2 tablets (1,000 mg total) by mouth 2 (two) times daily with meals., Disp: 360 tablet, Rfl: 3    pravastatin (PRAVACHOL) 20 MG tablet, Take 1 tablet (20 mg total) by mouth every morning., Disp: 90 tablet, Rfl: 3    semaglutide (OZEMPIC) 2 mg/dose (8 mg/3 mL) PnIj, Inject 2 mg into the skin every 7 days., Disp: 3 each, Rfl: 3    insulin glargine, TOUJEO, (TOUJEO SOLOSTAR U-300 INSULIN) 300 unit/mL (1.5 mL) InPn pen, Inject 30 Units into the skin once daily., Disp: 9 mL, Rfl: 3    insulin lispro-aabc (LYUMJEV KWIKPEN U-100 INSULIN) 100 unit/mL pen, Inject 6 Units into the skin 3 (three) times daily with meals. Plus correction scale up to 30 units daily, Disp: 3 pen , Rfl: 11    ROS as above    Objective:     Vitals:    01/05/24 0951   BP: 126/80   Pulse: 78     Wt Readings from Last 3 Encounters:   01/05/24 0951 84.5 kg (186 lb 4.6 oz)   12/01/23 0825 83 kg (183 lb)   11/21/23 1129 87.5 kg (193 lb)     Physical Exam  Constitutional:       Appearance: He is well-developed.   HENT:      Head: Normocephalic.   Eyes:      Conjunctiva/sclera: Conjunctivae normal.   Pulmonary:      Effort: Pulmonary effort is normal.   Musculoskeletal:         General: Normal range of motion.   Skin:     General: Skin is warm.      Findings: No rash.   Neurological:      Mental Status: He is alert and oriented to person, place, and time.       Protective Sensation (w/ 10 gram monofilament):  Right: Intact  Left: Intact    Visual Inspection:  Dry Skin -  Bilateral    Pedal Pulses:   Right: Present  Left: Present    Posterior Tibialis  Pulses:   Right:Present  Left: Present          LABS    Chemistry        Component Value Date/Time     01/02/2024 0718    K 4.9 01/02/2024 0718     01/02/2024 0718    CO2 26 01/02/2024 0718    BUN 13 01/02/2024 0718    CREATININE 0.8 01/02/2024 0718     (H) 01/02/2024 0718        Component Value Date/Time    CALCIUM 10.0 01/02/2024 0718    ALKPHOS 45 (L) 01/02/2024 0718    AST 15 01/02/2024 0718    ALT 16 01/02/2024 0718    BILITOT 0.3 01/02/2024 0718    ESTGFRAFRICA >60.0 12/06/2021 0820    ESTGFRAFRICA >60.0 12/06/2021 0820    EGFRNONAA >60.0 12/06/2021 0820    EGFRNONAA >60.0 12/06/2021 0820              Assessment and Plan     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 Take 5 minutes before eating, skip if you do not eat    Correction Scale  You can also take extra Lyumjev insulin if your blood sugar is higher than 150.  If your blood sugar is:  150-199             give extra 1 units of insulin to yourself.  200-249                             2 units  250-299         3 units  300-349                             4 units  350+                                  5 units    I will refer you to diabetes education for carb counting    Discussed foot care    Essential hypertension  On losartan  BP at goal    Hyperlipidemia associated with type 2 diabetes mellitus  Cont statin  LDL at goal          RTC 3 mo virtual visit        Lena Cadena MD

## 2024-01-05 NOTE — ASSESSMENT & PLAN NOTE
Control improved with resumption of Dexcom but still with hyperglycemia after meals particularly when they contain carbs  Feeling frustrated by this, open to change from SFU to prandial insulin  Would be great pump candidate in future and will also refer to start learning carb counting    Patient Instructions   Stop taking glimepiride    Change levemir to Toujeo 30 units once daily    Start mealtime insulin Lyumjev 6 units plus correction scale before full meal. Take 5 minutes before eating, skip if you do not eat    Correction Scale  You can also take extra Lyumjev insulin if your blood sugar is higher than 150.  If your blood sugar is:  150-199             give extra 1 units of insulin to yourself.  200-249                             2 units  250-299         3 units  300-349                             4 units  350+                                  5 units    I will refer you to diabetes education for carb counting    Discussed foot care

## 2024-01-05 NOTE — PATIENT INSTRUCTIONS
Stop taking glimepiride    Change levemir to Toujeo 30 units once daily    Start mealtime insulin Lyumjev 6 units plus correction scale before full meal. Take 5 minutes before eating, skip if you do not eat    Correction Scale  You can also take extra Lyumjev insulin if your blood sugar is higher than 150.  If your blood sugar is:  150-199             give extra 1 units of insulin to yourself.  200-249                             2 units  250-299         3 units  300-349                             4 units  350+                                  5 units    I will refer you to diabetes education for carb counting

## 2024-01-24 ENCOUNTER — PATIENT MESSAGE (OUTPATIENT)
Dept: DIABETES | Facility: CLINIC | Age: 54
End: 2024-01-24
Payer: COMMERCIAL

## 2024-01-28 ENCOUNTER — PATIENT MESSAGE (OUTPATIENT)
Dept: INTERNAL MEDICINE | Facility: CLINIC | Age: 54
End: 2024-01-28
Payer: COMMERCIAL

## 2024-01-28 DIAGNOSIS — E78.2 MIXED HYPERLIPIDEMIA: ICD-10-CM

## 2024-01-29 RX ORDER — PRAVASTATIN SODIUM 20 MG/1
20 TABLET ORAL EVERY MORNING
Qty: 90 TABLET | Refills: 3 | Status: SHIPPED | OUTPATIENT
Start: 2024-01-29

## 2024-02-06 ENCOUNTER — OFFICE VISIT (OUTPATIENT)
Dept: URGENT CARE | Facility: CLINIC | Age: 54
End: 2024-02-06
Payer: COMMERCIAL

## 2024-02-06 VITALS
BODY MASS INDEX: 23.25 KG/M2 | RESPIRATION RATE: 16 BRPM | TEMPERATURE: 98 F | WEIGHT: 186 LBS | DIASTOLIC BLOOD PRESSURE: 83 MMHG | HEART RATE: 89 BPM | SYSTOLIC BLOOD PRESSURE: 123 MMHG | OXYGEN SATURATION: 99 %

## 2024-02-06 DIAGNOSIS — B97.89 ACUTE VIRAL SINUSITIS: Primary | ICD-10-CM

## 2024-02-06 DIAGNOSIS — J01.90 ACUTE VIRAL SINUSITIS: Primary | ICD-10-CM

## 2024-02-06 PROCEDURE — 99213 OFFICE O/P EST LOW 20 MIN: CPT | Mod: S$GLB,,, | Performed by: FAMILY MEDICINE

## 2024-02-06 RX ORDER — CETIRIZINE HYDROCHLORIDE 10 MG/1
10 TABLET ORAL DAILY
Qty: 30 TABLET | Refills: 0 | Status: SHIPPED | OUTPATIENT
Start: 2024-02-06 | End: 2024-03-07

## 2024-02-06 RX ORDER — AZELASTINE 1 MG/ML
1 SPRAY, METERED NASAL 2 TIMES DAILY
Qty: 30 ML | Refills: 0 | Status: SHIPPED | OUTPATIENT
Start: 2024-02-06 | End: 2024-04-08

## 2024-02-06 RX ORDER — FLUTICASONE PROPIONATE 50 MCG
1 SPRAY, SUSPENSION (ML) NASAL DAILY
Qty: 16 G | Refills: 0 | Status: SHIPPED | OUTPATIENT
Start: 2024-02-06

## 2024-02-06 NOTE — PROGRESS NOTES
Subjective:      Patient ID: Filiberto Cohn is a 53 y.o. male.    Vitals:  weight is 84.4 kg (186 lb). His oral temperature is 97.5 °F (36.4 °C). His blood pressure is 123/83 and his pulse is 89. His respiration is 16 and oxygen saturation is 99%.     Chief Complaint: Sinus Problem    This is a 53 y.o. male who presents today with a chief complaint of productive cough, nasal congestion, rhinorrhea and sinus pressure x 6 days. Pt also presents with ear congestion. No nausea, vomiting, diarrhea, abd px, fever, sore throat, ear px or H/A.     Home tx: none    PMH: Positive COVID in January 25, 2024; seasonal allergies  Provider note begins below:  Past Medical History:  No date: Diabetes mellitus type II  No date: Hyperlipidemia  No date: Hypertension   Pt reports he started with sinus congestion 1/31, he says he had cv 1/25 tested pos, he had some fatigue but no congestion or cough. He says the congestion started 1/31. No fever or chills. No cough or SOB. No GI related symptoms, including, N/v/D or constipation. No anosmia or ageusia.      Sinus Problem  This is a new problem. The current episode started in the past 7 days. The problem has been gradually worsening since onset. There has been no fever. His pain is at a severity of 0/10. He is experiencing no pain. Associated symptoms include congestion, coughing and sinus pressure. Pertinent negatives include no chills, diaphoresis, ear pain, headaches, sneezing or sore throat.       Constitution: Negative for activity change, appetite change, chills, sweating, fatigue, fever and unexpected weight change.   HENT:  Positive for congestion, postnasal drip, sinus pain and sinus pressure. Negative for ear pain, ear discharge, foreign body in ear, nosebleeds, foreign body in nose and sore throat.    Cardiovascular:  Negative for chest pain, leg swelling and palpitations.   Respiratory:  Positive for cough.    Allergic/Immunologic: Negative for sneezing.   Neurological:   Negative for headaches.      Objective:     Physical Exam   Constitutional: He is oriented to person, place, and time. He appears well-developed.  Non-toxic appearance. He does not appear ill. No distress.   HENT:   Head: Normocephalic and atraumatic.   Ears:   Right Ear: External ear normal.   Left Ear: External ear normal.   Nose: Nose normal.   Mouth/Throat: Oropharynx is clear and moist.   Eyes: Conjunctivae, EOM and lids are normal. Pupils are equal, round, and reactive to light.   Neck: Trachea normal and phonation normal. Neck supple.   Pulmonary/Chest: Effort normal and breath sounds normal.   Musculoskeletal: Normal range of motion.         General: Normal range of motion.   Neurological: He is alert and oriented to person, place, and time.   Skin: Skin is warm, dry, intact and not diaphoretic.   Psychiatric: His speech is normal and behavior is normal. Judgment and thought content normal.   Nursing note and vitals reviewed.      Assessment:     1. Acute viral sinusitis        Plan:     Vss, lungs ctab, nad, increase antihistamines, continue Neti pot, add Astelin.    Discussed results/diagnosis/plan with patient in clinic. Strict precautions given to patient to monitor for worsening signs and symptoms. Advised to follow up with PCP or specialist.    Explained side effects of medications prescribed with patient and informed him/her to discontinue use if he/she has any side effects and to inform UC or PCP if this occurs. All questions answered. Strict ED verses clinic return precautions stressed and given in depth. Advised if symptoms worsens of fail to improve he/she should go to the Emergency Room. Discharge and follow-up instructions given verbally/printed with the patient who expressed understanding and willingness to comply with my recommendations. Patient voiced understanding and in agreement with current treatment plan. Patient exits the exam room in no acute distress. Conversant and engaged during  discharge discussion, verbalized understanding.      Acute viral sinusitis  -     cetirizine (ZYRTEC) 10 MG tablet; Take 1 tablet (10 mg total) by mouth once daily.  Dispense: 30 tablet; Refill: 0  -     fluticasone propionate (FLONASE) 50 mcg/actuation nasal spray; 1 spray (50 mcg total) by Each Nostril route once daily.  Dispense: 16 g; Refill: 0  -     azelastine (ASTELIN) 137 mcg (0.1 %) nasal spray; 1 spray (137 mcg total) by Nasal route 2 (two) times daily.  Dispense: 30 mL; Refill: 0                Patient Instructions   General Discharge Instructions   PLEASE READ YOUR DISCHARGE INSTRUCTIONS ENTIRELY AS IT CONTAINS IMPORTANT INFORMATION.  If you were prescribed a narcotic or controlled medication, do not drive or operate heavy equipment or machinery while taking these medications.  If you were prescribed antibiotics, please take them to completion.  You must understand that you've received an Urgent Care treatment only and that you may be released before all your medical problems are known or treated. You, the patient, will arrange for follow up care as instructed.    OVER THE COUNTER RECOMMENDATIONS/SUGGESTIONS.    Make sure to stay well hydrated.    Use Nasal Saline to mechanically move any post nasal drip from your eustachian tube or from the back of your throat.    Use warm salt water gargles to ease your throat pain. Warm salt water gargles as needed for sore throat- 1/2 tsp salt to 1 cup warm water, gargle as desired.    Use an antihistamine such as Claritin, Zyrtec or Allegra to dry you out.    Use pseudoephedrine (behind the counter) to decongest. Pseudoephedrine 30 mg up to 240 mg /day. It can raise your blood pressure and give you palpitations.    Use mucinex (guaifenesin) to break up mucous up to 2400mg/day to loosen any mucous.    The mucinex DM pill has a cough suppressant that can be sedating. It can be used at night to stop the tickle at the back of your throat.    You can use Mucinex D (it  has guaifenesin and a high dose of pseudoephedrine) in the mornings to help decongest.    Use Afrin in each nare for no longer than 3 days, as it is addictive. It can also dry out your mucous membranes and cause elevated blood pressure. This is especially useful if you are flying.    Use Flonase 1-2 sprays/nostril per day. It is a local acting steroid nasal spray, if you develop a bloody nose, stop using the medication immediately.    Sometimes Nyquil at night is beneficial to help you get some rest, however it is sedating and it does have an antihistamine, and tylenol.    Honey is a natural cough suppressant that can be used.    Tylenol up to 4,000 mg a day is safe for short periods and can be used for body aches, pain, and fever. However in high doses and prolonged use it can cause liver irritation.    Ibuprofen is a non-steroidal anti-inflammatory that can be used for body aches, pain, and fever.However it can also cause stomach irritation if over used.     Follow up with your PCP or specialty clinic as instructed in the next 2-3 days if not improved or as needed. You can call [phone] to schedule an appointment with appropriate provider.      If you condition worsens, we recommend that you receive another evaluation at the emergency room immediately or contact your primary medical clinic's after hours call service to discuss your concerns.      Please return here or go to the Emergency Department for any concerns or worsening condition.   You can also call [phone] to schedule an appointment with the appropriate provider.    Please return here or go to the Emergency Department for any concerns or worsening of condition.    Thank you for choosing [facility]!    Our goal in the Urgent Care is to always provide outstanding medical care. You may receive a survey by mail or e-mail in the next week regarding your experience today. We would greatly appreciate you completing and returning the  survey. Your feedback provides us with a way to recognize our staff who provide very good care, and it helps us learn how to improve when your experience was below our aspiration of excellence.      We appreciate you trusting us with your medical care. We hope you feel better soon. We will be happy to take care of you for all of your future medical needs.    Sincerely,    BRYCE Zurita  Follow up with your Primary Care Provider in 2-3 days if no improvement.  If you do not have one, please see the list provided and become established with one.  If your condition worsens we recommend that you receive another evaluation at the emergency room immediately or contact your primary medical clinics after hours call service to discuss your concerns.  Flonase nasal spray as directed. Breathe right strips at night to help you breathe.  A cool mist humidifier in bedroom may help with cough and relieve stuffy nose.  Sore throat:  Lozenge, hard candy or honey.  Sinus rinses DO NOT USE TAP WATER, if you must, water must be a rolling boil for 1 minute, let it cool, then use.  May use distilled water.  Vics vapor rub in shower to help open nasal passages.  May use nasal gel to keep passages moisturized.  May use Nasal saline sprays during the day for added relief of congestion.   For those who go to the gym, please do not use the sauna or steam room now to clear sinuses.  During pollen season, change shirt if you are outside for a while when you go in.  Also wash your face.  Do not touch your face with your hands.  Wash your hands often in general while ill, avoid face contact with hands. Good nutrition. Lots of rest You may or may not have received a steroid injection, if you have, you may experience some jitters or develop nervousness.  You may also not rest well this night- these symptoms will resolve.  If you were give a prescription for steroids, do not medications such as Motrin, Advil, Ibuprofen, Aleve, Mobic, or  Toradol while taking the steroid. these are non-steroidal anti-inflammatory medications which you do not need while taking steroids.  If you were given an antibiotic to take at home, please take it until it is completed even if you begin feeling better prior to the course of therapy being completed.  To attempt to minimize abdominal discomfort while taking antibiotics, you may try to take probiotics.  SEPARATE the antibiotics and probiotics by TWO hours, if not neither medication will work.  If you received a steroid shot today - this can elevate your blood pressure, elevate your blood sugar, water weight gain, nervous energy, redness to the face and dimpling of the skin where the shot goes in.     You must understand that you've received an Urgent Care treatment only and that you may be released before all your medical problems are known or treated. You, the patient, will arrange for follow up care as instructed

## 2024-02-06 NOTE — PATIENT INSTRUCTIONS
General Discharge Instructions   PLEASE READ YOUR DISCHARGE INSTRUCTIONS ENTIRELY AS IT CONTAINS IMPORTANT INFORMATION.  If you were prescribed a narcotic or controlled medication, do not drive or operate heavy equipment or machinery while taking these medications.  If you were prescribed antibiotics, please take them to completion.  You must understand that you've received an Urgent Care treatment only and that you may be released before all your medical problems are known or treated. You, the patient, will arrange for follow up care as instructed.    OVER THE COUNTER RECOMMENDATIONS/SUGGESTIONS.    Make sure to stay well hydrated.    Use Nasal Saline to mechanically move any post nasal drip from your eustachian tube or from the back of your throat.    Use warm salt water gargles to ease your throat pain. Warm salt water gargles as needed for sore throat- 1/2 tsp salt to 1 cup warm water, gargle as desired.    Use an antihistamine such as Claritin, Zyrtec or Allegra to dry you out.    Use pseudoephedrine (behind the counter) to decongest. Pseudoephedrine 30 mg up to 240 mg /day. It can raise your blood pressure and give you palpitations.    Use mucinex (guaifenesin) to break up mucous up to 2400mg/day to loosen any mucous.    The mucinex DM pill has a cough suppressant that can be sedating. It can be used at night to stop the tickle at the back of your throat.    You can use Mucinex D (it has guaifenesin and a high dose of pseudoephedrine) in the mornings to help decongest.    Use Afrin in each nare for no longer than 3 days, as it is addictive. It can also dry out your mucous membranes and cause elevated blood pressure. This is especially useful if you are flying.    Use Flonase 1-2 sprays/nostril per day. It is a local acting steroid nasal spray, if you develop a bloody nose, stop using the medication immediately.    Sometimes Nyquil at night is beneficial to help you get some rest, however it is sedating and it  does have an antihistamine, and tylenol.    Honey is a natural cough suppressant that can be used.    Tylenol up to 4,000 mg a day is safe for short periods and can be used for body aches, pain, and fever. However in high doses and prolonged use it can cause liver irritation.    Ibuprofen is a non-steroidal anti-inflammatory that can be used for body aches, pain, and fever.However it can also cause stomach irritation if over used.     Follow up with your PCP or specialty clinic as instructed in the next 2-3 days if not improved or as needed. You can call [phone] to schedule an appointment with appropriate provider.      If you condition worsens, we recommend that you receive another evaluation at the emergency room immediately or contact your primary medical clinic's after hours call service to discuss your concerns.      Please return here or go to the Emergency Department for any concerns or worsening condition.   You can also call [phone] to schedule an appointment with the appropriate provider.    Please return here or go to the Emergency Department for any concerns or worsening of condition.    Thank you for choosing [facility]!    Our goal in the Urgent Care is to always provide outstanding medical care. You may receive a survey by mail or e-mail in the next week regarding your experience today. We would greatly appreciate you completing and returning the survey. Your feedback provides us with a way to recognize our staff who provide very good care, and it helps us learn how to improve when your experience was below our aspiration of excellence.      We appreciate you trusting us with your medical care. We hope you feel better soon. We will be happy to take care of you for all of your future medical needs.    Sincerely,    BRYCE Zurita  Follow up with your Primary Care Provider in 2-3 days if no improvement.  If you do not have one, please see the list provided and become  established with one.  If your condition worsens we recommend that you receive another evaluation at the emergency room immediately or contact your primary medical clinics after hours call service to discuss your concerns.  Flonase nasal spray as directed. Breathe right strips at night to help you breathe.  A cool mist humidifier in bedroom may help with cough and relieve stuffy nose.  Sore throat:  Lozenge, hard candy or honey.  Sinus rinses DO NOT USE TAP WATER, if you must, water must be a rolling boil for 1 minute, let it cool, then use.  May use distilled water.  Vics vapor rub in shower to help open nasal passages.  May use nasal gel to keep passages moisturized.  May use Nasal saline sprays during the day for added relief of congestion.   For those who go to the gym, please do not use the sauna or steam room now to clear sinuses.  During pollen season, change shirt if you are outside for a while when you go in.  Also wash your face.  Do not touch your face with your hands.  Wash your hands often in general while ill, avoid face contact with hands. Good nutrition. Lots of rest You may or may not have received a steroid injection, if you have, you may experience some jitters or develop nervousness.  You may also not rest well this night- these symptoms will resolve.  If you were give a prescription for steroids, do not medications such as Motrin, Advil, Ibuprofen, Aleve, Mobic, or Toradol while taking the steroid. these are non-steroidal anti-inflammatory medications which you do not need while taking steroids.  If you were given an antibiotic to take at home, please take it until it is completed even if you begin feeling better prior to the course of therapy being completed.  To attempt to minimize abdominal discomfort while taking antibiotics, you may try to take probiotics.  SEPARATE the antibiotics and probiotics by TWO hours, if not neither medication will work.  If you received a steroid shot today - this  can elevate your blood pressure, elevate your blood sugar, water weight gain, nervous energy, redness to the face and dimpling of the skin where the shot goes in.     You must understand that you've received an Urgent Care treatment only and that you may be released before all your medical problems are known or treated. You, the patient, will arrange for follow up care as instructed

## 2024-02-07 ENCOUNTER — CLINICAL SUPPORT (OUTPATIENT)
Dept: DIABETES | Facility: CLINIC | Age: 54
End: 2024-02-07
Payer: COMMERCIAL

## 2024-02-07 DIAGNOSIS — E13.9 LADA (LATENT AUTOIMMUNE DIABETES IN ADULTS), MANAGED AS TYPE 2: ICD-10-CM

## 2024-02-07 PROCEDURE — 99999 PR PBB SHADOW E&M-EST. PATIENT-LVL II: CPT | Mod: PBBFAC,,,

## 2024-02-07 PROCEDURE — G0108 DIAB MANAGE TRN  PER INDIV: HCPCS | Mod: S$GLB,,, | Performed by: INTERNAL MEDICINE

## 2024-02-21 ENCOUNTER — OFFICE VISIT (OUTPATIENT)
Dept: INTERNAL MEDICINE | Facility: CLINIC | Age: 54
End: 2024-02-21
Payer: COMMERCIAL

## 2024-02-21 VITALS
HEIGHT: 75 IN | OXYGEN SATURATION: 97 % | DIASTOLIC BLOOD PRESSURE: 82 MMHG | BODY MASS INDEX: 23.44 KG/M2 | TEMPERATURE: 98 F | HEART RATE: 81 BPM | SYSTOLIC BLOOD PRESSURE: 122 MMHG | WEIGHT: 188.5 LBS

## 2024-02-21 DIAGNOSIS — Z00.00 ANNUAL PHYSICAL EXAM: Primary | ICD-10-CM

## 2024-02-21 DIAGNOSIS — Z79.899 ENCOUNTER FOR LONG-TERM (CURRENT) USE OF OTHER MEDICATIONS: ICD-10-CM

## 2024-02-21 PROCEDURE — 1160F RVW MEDS BY RX/DR IN RCRD: CPT | Mod: CPTII,S$GLB,, | Performed by: FAMILY MEDICINE

## 2024-02-21 PROCEDURE — 1159F MED LIST DOCD IN RCRD: CPT | Mod: CPTII,S$GLB,, | Performed by: FAMILY MEDICINE

## 2024-02-21 PROCEDURE — 3074F SYST BP LT 130 MM HG: CPT | Mod: CPTII,S$GLB,, | Performed by: FAMILY MEDICINE

## 2024-02-21 PROCEDURE — 3079F DIAST BP 80-89 MM HG: CPT | Mod: CPTII,S$GLB,, | Performed by: FAMILY MEDICINE

## 2024-02-21 PROCEDURE — 3008F BODY MASS INDEX DOCD: CPT | Mod: CPTII,S$GLB,, | Performed by: FAMILY MEDICINE

## 2024-02-21 PROCEDURE — 3044F HG A1C LEVEL LT 7.0%: CPT | Mod: CPTII,S$GLB,, | Performed by: FAMILY MEDICINE

## 2024-02-21 PROCEDURE — 99999 PR PBB SHADOW E&M-EST. PATIENT-LVL IV: CPT | Mod: PBBFAC,,, | Performed by: FAMILY MEDICINE

## 2024-02-21 PROCEDURE — 99396 PREV VISIT EST AGE 40-64: CPT | Mod: S$GLB,,, | Performed by: FAMILY MEDICINE

## 2024-02-21 NOTE — PROGRESS NOTES
"Subjective:       Patient ID: Filiberto Cohn is a 53 y.o. male.    Chief Complaint: Follow-up    Follow-up  Associated symptoms include arthralgias. Pertinent negatives include no chest pain, headaches, joint swelling, neck pain, vomiting or weakness.       Filiberto Cohn is a 53 y.o. male PMHx DM, HTN, HLD for checkup.      #Cards: HTN, HLD  - reg: Losartan 25 qd; Pravastatin 20 qd     #Endo: DM (MAX; A1c 1/2024 = 6.9)  - est w/ Dr. Cadena,  1/2024  - reg: Insulin (Toujeo 30 qd; Lyumjev 6U acs); Ozempic 2 qwk; Metformin 500 2tab bid  - using Dexcom  - eye exam: est w/ outside ophtho,  7/2023  - foot exam 1/2024  - urine due     #Psych: Adult ADHD  - est w/ Dr. Handy Cowart  - reg: Adderall XR 15 qd    Review of Systems   Constitutional:  Negative for activity change and unexpected weight change.   HENT:  Negative for hearing loss, rhinorrhea and trouble swallowing.    Eyes:  Negative for discharge and visual disturbance.   Respiratory:  Negative for chest tightness and wheezing.    Cardiovascular:  Negative for chest pain and palpitations.   Gastrointestinal:  Negative for blood in stool, constipation, diarrhea and vomiting.   Endocrine: Negative for polydipsia and polyuria.   Genitourinary:  Negative for difficulty urinating, hematuria and urgency.   Musculoskeletal:  Positive for arthralgias. Negative for joint swelling and neck pain.   Neurological:  Negative for weakness and headaches.   Psychiatric/Behavioral:  Negative for confusion and dysphoric mood.          Past Medical History:   Diagnosis Date    Diabetes mellitus type II     Hyperlipidemia     Hypertension         Prior to Admission medications    Medication Sig Start Date End Date Taking? Authorizing Provider   BD ULTRA-FINE KRUNAL PEN NEEDLE 32 gauge x 5/32" Ndle At bed time 11/15/22  Yes Roman Linton MD   blood-glucose sensor (DEXCOM G7 SENSOR) Angie 1 each by Misc.(Non-Drug; Combo Route) route every 10 days. 8/28/23 8/27/24 Yes Lena Cadena, " MD   cetirizine (ZYRTEC) 10 MG tablet Take 1 tablet (10 mg total) by mouth once daily. 2/6/24 3/7/24 Yes Opal Andino NP   dextroamphetamine-amphetamine (ADDERALL XR) 15 MG 24 hr capsule Take 15 mg by mouth every morning. 2/21/15  Yes Provider, Historical   fluticasone propionate (FLONASE) 50 mcg/actuation nasal spray 1 spray by Each Nostril route once daily.   Yes Provider, Historical   fluticasone propionate (FLONASE) 50 mcg/actuation nasal spray 1 spray (50 mcg total) by Each Nostril route once daily. 2/6/24  Yes Opal Andino NP   insulin glargine, TOUJEO, (TOUJEO SOLOSTAR U-300 INSULIN) 300 unit/mL (1.5 mL) InPn pen Inject 30 Units into the skin once daily. 1/5/24 1/4/25 Yes Lena Cadena MD   insulin lispro-aabc (LYUMJEV KWIKPEN U-100 INSULIN) 100 unit/mL pen Inject 6 Units into the skin 3 (three) times daily with meals. Plus correction scale up to 30 units daily 1/5/24 1/4/25 Yes Lena Cadena MD   losartan (COZAAR) 25 MG tablet Take 1 tablet (25 mg total) by mouth once daily. 12/20/23  Yes Ryder Herrera MD   metFORMIN (GLUCOPHAGE) 500 MG tablet Take 2 tablets (1,000 mg total) by mouth 2 (two) times daily with meals. 12/12/22  Yes Tiffany Zhou MD   pravastatin (PRAVACHOL) 20 MG tablet Take 1 tablet (20 mg total) by mouth every morning. 1/29/24  Yes Ryder Herrera MD   semaglutide (OZEMPIC) 2 mg/dose (8 mg/3 mL) PnIj Inject 2 mg into the skin every 7 days. 8/28/23  Yes Lena Cadena MD   azelastine (ASTELIN) 137 mcg (0.1 %) nasal spray 1 spray (137 mcg total) by Nasal route 2 (two) times daily.  Patient not taking: Reported on 2/21/2024 2/6/24 2/5/25  Opal Andino NP        Past medical history, surgical history, and family medical history reviewed and updated as appropriate.    Medications and allergies reviewed.     Objective:          Vitals:    02/21/24 0807   BP: 122/82   BP Location: Left arm   Patient Position: Sitting   BP Method: Medium (Manual)   Pulse: 81  "  Temp: 97.9 °F (36.6 °C)   SpO2: 97%   Weight: 85.5 kg (188 lb 7.9 oz)   Height: 6' 3" (1.905 m)     Body mass index is 23.56 kg/m².  Physical Exam  Vitals and nursing note reviewed.   Constitutional:       General: He is not in acute distress.     Appearance: Normal appearance. He is well-developed.   Eyes:      Extraocular Movements: Extraocular movements intact.   Cardiovascular:      Rate and Rhythm: Normal rate and regular rhythm.      Pulses: Normal pulses.      Heart sounds: Normal heart sounds. No murmur heard.  Pulmonary:      Effort: Pulmonary effort is normal. No respiratory distress.      Breath sounds: Normal breath sounds. No wheezing.   Neurological:      Mental Status: He is alert and oriented to person, place, and time.   Psychiatric:         Mood and Affect: Mood normal.         Behavior: Behavior normal.         Lab Results   Component Value Date    WBC 5.41 12/06/2021    HGB 11.9 (L) 12/06/2021    HCT 38.2 (L) 12/06/2021     12/06/2021    CHOL 131 01/02/2024    TRIG 85 01/02/2024    HDL 41 01/02/2024    ALT 16 01/02/2024    AST 15 01/02/2024     01/02/2024    K 4.9 01/02/2024     01/02/2024    CREATININE 0.8 01/02/2024    BUN 13 01/02/2024    CO2 26 01/02/2024    TSH 1.329 01/02/2024    PSA 0.65 07/14/2023    HGBA1C 6.9 (H) 01/02/2024       Assessment:       1. Annual physical exam    2. Encounter for long-term (current) use of other medications          Plan:   1. Annual physical exam  -     PSA, Screening; Future; Expected date: 02/21/2024  -     Microalbumin/Creatinine Ratio, Urine; Future; Expected date: 02/21/2024    2. Encounter for long-term (current) use of other medications  -     PSA, Screening; Future; Expected date: 02/21/2024  -     Microalbumin/Creatinine Ratio, Urine; Future; Expected date: 02/21/2024        Health maintenance reviewed with patient.     No follow-ups on file.    Ryder Herrera MD  Family Medicine / Primary Care  [facility]  2/21/2024

## 2024-03-05 ENCOUNTER — PATIENT MESSAGE (OUTPATIENT)
Dept: INTERNAL MEDICINE | Facility: CLINIC | Age: 54
End: 2024-03-05
Payer: COMMERCIAL

## 2024-03-05 DIAGNOSIS — E11.65 TYPE 2 DIABETES MELLITUS WITH HYPERGLYCEMIA, WITHOUT LONG-TERM CURRENT USE OF INSULIN: ICD-10-CM

## 2024-03-07 RX ORDER — METFORMIN HYDROCHLORIDE 500 MG/1
1000 TABLET ORAL 2 TIMES DAILY WITH MEALS
Qty: 360 TABLET | Refills: 3 | Status: SHIPPED | OUTPATIENT
Start: 2024-03-07

## 2024-03-08 NOTE — PROGRESS NOTES
Diabetes Care Specialist Progress Note  Author: Twyla Smyth RD, CDE  Date: 2024    Program Intake  Reason for Diabetes Program Visit:: Intervention  Type of Intervention:: Individual  Individual: Education  Education:  (Carb counting)    Current diabetes risk level:: low      Lab Results   Component Value Date    HGBA1C 6.9 (H) 2024           Clinical  Problem Review  Reviewed Problem List with Patient: yes    Clinical Assessment  Current Diabetes Treatment: Insulin, Injectable, Oral Medication  Levemir 34 units  Ozempic 2 mg weekly  Metformin 1000 mg BID  Lyumjev 6 units + -200 +1      Have you ever experienced hypoglycemia (low blood sugar)?: no  Have you ever experienced hyperglycemia (high blood sugar)?: yes  In the last month, how often have you experienced high blood sugar?:  (NONE recently)      SMBG via Dexcom G7  Average B mg/dL  Time in Range:  82%  (1% low, 16% high)  GMI:  6.7%          Medication Information  Medication adherence impacting ability to self-manage diabetes?: No    Labs  Lab Compliance Barriers: No          Nutritional Status  Diet:  (limits sugars and carbs; SF drinks only)  Current nutritional status an area of need that is impacting patient's ability to self-manage diabetes?: No              Diabetes Self-Management Care Plan:    Today's Diabetes Self-Management Care Plan was developed with Filiberto's input. Filiberto has agreed to work toward the following goal(s) to improve his/her overall diabetes control.      Care Plan: Diabetes Management   Updates made since 2024 12:00 AM        Problem: Medications         Goal: Pt will take insulin as instructed today    Start Date: 2022   Expected End Date: 2024   This Visit's Progress: On track   Priority: High   Barriers: Knowledge deficit   Note:      Recently changed from basal insulin to MDI.   Doing well overall. Avg BG and TIR at/above goal.     Endo feels he is a good pump candidate.   We discussed  basics of pump today and different pumps available on the market.     He doesn't feel like he is interested in a pump at this time.     He does feel he would benefit from carb counting and using ICR.     Instructed on advanced carb counting today.   He will start with trial of ICR of 10.                    Follow Up Plan     F/u in 6 months as needed            Today's care plan and follow up schedule was discussed with patient.  Filiberto verbalized understanding of the care plan, goals, and agrees to follow up plan.        The patient was encouraged to communicate with his/her health care provider/physician and care team regarding his/her condition(s) and treatment.  I provided the patient with my contact information today and encouraged to contact me via phone or [facility] as needed.           Length of Visit   Total Time: 60 Minutes

## 2024-04-08 ENCOUNTER — PATIENT MESSAGE (OUTPATIENT)
Dept: ENDOCRINOLOGY | Facility: CLINIC | Age: 54
End: 2024-04-08

## 2024-04-08 ENCOUNTER — OFFICE VISIT (OUTPATIENT)
Dept: ENDOCRINOLOGY | Facility: CLINIC | Age: 54
End: 2024-04-08
Payer: COMMERCIAL

## 2024-04-08 DIAGNOSIS — E11.69 HYPERLIPIDEMIA ASSOCIATED WITH TYPE 2 DIABETES MELLITUS: ICD-10-CM

## 2024-04-08 DIAGNOSIS — E13.9 LADA (LATENT AUTOIMMUNE DIABETES IN ADULTS), MANAGED AS TYPE 2: Primary | ICD-10-CM

## 2024-04-08 DIAGNOSIS — E78.5 HYPERLIPIDEMIA ASSOCIATED WITH TYPE 2 DIABETES MELLITUS: ICD-10-CM

## 2024-04-08 PROCEDURE — 95251 CONT GLUC MNTR ANALYSIS I&R: CPT | Mod: NDTC,S$GLB,, | Performed by: INTERNAL MEDICINE

## 2024-04-08 PROCEDURE — 99214 OFFICE O/P EST MOD 30 MIN: CPT | Mod: 25,95,, | Performed by: INTERNAL MEDICINE

## 2024-04-08 PROCEDURE — 1160F RVW MEDS BY RX/DR IN RCRD: CPT | Mod: CPTII,95,, | Performed by: INTERNAL MEDICINE

## 2024-04-08 PROCEDURE — 3044F HG A1C LEVEL LT 7.0%: CPT | Mod: CPTII,95,, | Performed by: INTERNAL MEDICINE

## 2024-04-08 PROCEDURE — 1159F MED LIST DOCD IN RCRD: CPT | Mod: CPTII,95,, | Performed by: INTERNAL MEDICINE

## 2024-04-08 PROCEDURE — 4010F ACE/ARB THERAPY RXD/TAKEN: CPT | Mod: CPTII,95,, | Performed by: INTERNAL MEDICINE

## 2024-04-08 RX ORDER — INSULIN LISPRO-AABC 100 [IU]/ML
INJECTION, SOLUTION SUBCUTANEOUS
Qty: 18 PEN | Refills: 3 | Status: SHIPPED | OUTPATIENT
Start: 2024-04-08

## 2024-04-08 RX ORDER — GLUCAGON INJECTION, SOLUTION 0.5 MG/.1ML
1 INJECTION, SOLUTION SUBCUTANEOUS
Qty: 1 EACH | Refills: 3 | Status: SHIPPED | OUTPATIENT
Start: 2024-04-08

## 2024-04-08 NOTE — PROGRESS NOTES
Filiberto Cohn is a 53 y.o. male with HTN, HLD presenting for follow-up of MAX treated as type 2    The patient location is: [place]  The chief complaint leading to consultation is:   Chief Complaint   Patient presents with    Diabetes       Visit type: audiovisual    Face to Face time with patient: 15 minutes  25 minutes of total time spent on the encounter, which includes face to face time and non-face to face time preparing to see the patient (eg, review of tests), Obtaining and/or reviewing separately obtained history, Documenting clinical information in the electronic or other health record, Independently interpreting results (not separately reported) and communicating results to the patient/family/caregiver, or Care coordination (not separately reported).    Each patient to whom he or she provides medical services by telemedicine is:  (1) informed of the relationship between the physician and patient and the respective role of any other health care provider with respect to management of the patient; and (2) notified that he or she may decline to receive medical services by telemedicine and may withdraw from such care at any time.        History of Present Illness  T2DM  Diagnosed around 2012  Known complications: denies    Seen by Dr. Cho in 2022 with A1c 11%. Labs with +NIKKO, detectable c-peptide. Started on basal insulin at that time. Also started on Dexcom. Has had great improvement    At last visit had been off ozempic but now back on with significant improvement in control however still finds that he is running high after meals particularly when eating carbs  Following overall healthy diet and has maintained weight loss now but struggling with control    Sister with also now with diagnosis of MAX, thinks dad may have had it as well based on DM history  Component      Latest Ref Rng 9/15/2022   C-Peptide      0.78 - 5.19 ng/mL 1.77   Glucose 171   Glutamic Acid Decarb Ab      <=0.02 nmol/L 9.12 (H)       Since  "last visit stopped SFU and started lyumjev. He has had great control with this  Asking about stopping metformin    Sugars much higher when off ozempic due to shortages    Overcorrecting highs a bit, not using formal ISF      He and Twyla discussed pumps and interested in future but getting use to injections for meals for now    Current Diabetes Regimen:  Levemir 34 units  Ozempic 2 mg weekly  Lyumjev with carb ratio 1:10  Metformin 1000 mg BID    Prior mediations tried:  Invokana, Jardiance  - Jardiance led to really frequent urination which was interruptive   Actos- nausea      Recent Hgb A1C:  Lab Results   Component Value Date    HGBA1C 6.9 (H) 01/02/2024       Glucose Monitoring:  Dexcom G7      Hypoglycemic Episodes: as above    Screening / DM Complications:    Nephropathy:  ACEi/ARB: Taking  Lab Results   Component Value Date    MICALBCREAT 6.6 12/12/2022       Last Lipid Panel:  Statin: Taking  Lab Results   Component Value Date    LDLCALC 73.0 01/02/2024       Last foot exam : 01/05/2024  Last eye exam : 07/14/2023;  no laser surgery or DR    B12:  Lab Results   Component Value Date    [id number] 311 01/02/2024         Current Outpatient Medications:     BD ULTRA-FINE KRUNAL PEN NEEDLE 32 gauge x 5/32" Ndle, At bed time, Disp: 100 each, Rfl: 3    blood-glucose sensor (DEXCOM G7 SENSOR) Angie, 1 each by Misc.(Non-Drug; Combo Route) route every 10 days., Disp: 3 each, Rfl: 11    cetirizine (ZYRTEC) 10 MG tablet, Take 1 tablet (10 mg total) by mouth once daily., Disp: 30 tablet, Rfl: 0    dextroamphetamine-amphetamine (ADDERALL XR) 15 MG 24 hr capsule, Take 15 mg by mouth every morning., Disp: , Rfl: 0    fluticasone propionate (FLONASE) 50 mcg/actuation nasal spray, 1 spray (50 mcg total) by Each Nostril route once daily., Disp: 16 g, Rfl: 0    glucagon (GVOKE HYPOPEN 2-PACK) 0.5 mg/0.1 mL AtIn, Inject 1 each into the skin as needed (hypoglycemia)., Disp: 1 each, Rfl: 3    insulin glargine, TOUJEO, (TOUJEO " SOLOSTAR U-300 INSULIN) 300 unit/mL (1.5 mL) InPn pen, Inject 30 Units into the skin once daily., Disp: 9 mL, Rfl: 3    insulin lispro-aabc (LYUMJEV KWIKPEN U-100 INSULIN) 100 unit/mL pen, Use with carb ratio of 1 unit per 10 and correction 1 to 50. Up to 60 units daily, Disp: 18 Pen, Rfl: 3    losartan (COZAAR) 25 MG tablet, Take 1 tablet (25 mg total) by mouth once daily., Disp: 90 tablet, Rfl: 3    metFORMIN (GLUCOPHAGE) 500 MG tablet, Take 2 tablets (1,000 mg total) by mouth 2 (two) times daily with meals., Disp: 360 tablet, Rfl: 3    pravastatin (PRAVACHOL) 20 MG tablet, Take 1 tablet (20 mg total) by mouth every morning., Disp: 90 tablet, Rfl: 3    semaglutide (OZEMPIC) 2 mg/dose (8 mg/3 mL) PnIj, Inject 2 mg into the skin every 7 days., Disp: 3 each, Rfl: 3    ROS as above    Objective:     There were no vitals filed for this visit.    Wt Readings from Last 3 Encounters:   02/21/24 0807 85.5 kg (188 lb 7.9 oz)   02/06/24 1210 84.4 kg (186 lb)   01/05/24 0951 84.5 kg (186 lb 4.6 oz)             LABS    Chemistry        Component Value Date/Time     01/02/2024 0718    K 4.9 01/02/2024 0718     01/02/2024 0718    CO2 26 01/02/2024 0718    BUN 13 01/02/2024 0718    CREATININE 0.8 01/02/2024 0718     (H) 01/02/2024 0718        Component Value Date/Time    CALCIUM 10.0 01/02/2024 0718    ALKPHOS 45 (L) 01/02/2024 0718    AST 15 01/02/2024 0718    ALT 16 01/02/2024 0718    BILITOT 0.3 01/02/2024 0718    ESTGFRAFRICA >60.0 12/06/2021 0820    ESTGFRAFRICA >60.0 12/06/2021 0820    EGFRNONAA >60.0 12/06/2021 0820    EGFRNONAA >60.0 12/06/2021 0820              Assessment and Plan     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  If your blood sugar is:  150-199             give extra 1 units of insulin to yourself.  200-249                             2 units  250-299         3 units  300-349                             4 units  350+                                  5 units      Hyperlipidemia associated with type 2 diabetes mellitus  Cont statin  LDL at goal            RTC 6 mo         Lena Cadean MD

## 2024-04-08 NOTE — PATIENT INSTRUCTIONS
Ok to do trial of lower metformin dose and then off and see how you do  I sent higher Lyumjev prescription as well as Gvoke (glucagon, emergency rescue injection for hypoglycemia) to your pharmacy     Correction Scale  You can also take extra Lyumjev insulin if your blood sugar is higher than 150.  If your blood sugar is:  150-199             give extra 1 units of insulin to yourself.  200-249                             2 units  250-299         3 units  300-349                             4 units  350+                                  5 units      I would like to see you for a follow-up visit in 6 months which will be in October of 2024. My schedule for then will open at the beginning of June so please set yourself a reminder to schedule a visit when it opens. The spots fill quickly so please do this at the beginning of the month. You can reach out to us for help with scheduling or book yourself on the portal.

## 2024-04-08 NOTE — ASSESSMENT & PLAN NOTE
Excellent improvement in control with change to prandial insulin  Cont current doses but discussed ISF 1:50  In future consider pump  Ok to do trial of metformin, will watch Dexcom to see if significant change  Cont ozempic which he did note rise in glucose when off of    Glucagon sent and use discussed    Patient Instructions       Correction Scale  You can also take extra Lyumjev insulin if your blood sugar is higher than 150.  If your blood sugar is:  150-199             give extra 1 units of insulin to yourself.  200-249                             2 units  250-299         3 units  300-349                             4 units  350+                                  5 units

## 2024-09-02 DIAGNOSIS — E13.9 LADA (LATENT AUTOIMMUNE DIABETES IN ADULTS), MANAGED AS TYPE 2: ICD-10-CM

## 2024-09-03 RX ORDER — BLOOD-GLUCOSE SENSOR
EACH MISCELLANEOUS
Qty: 9 EACH | Refills: 3 | Status: SHIPPED | OUTPATIENT
Start: 2024-09-03

## 2024-09-17 DIAGNOSIS — E13.9 LADA (LATENT AUTOIMMUNE DIABETES IN ADULTS), MANAGED AS TYPE 2: ICD-10-CM

## 2024-09-17 RX ORDER — SEMAGLUTIDE 2.68 MG/ML
2 INJECTION, SOLUTION SUBCUTANEOUS WEEKLY
Qty: 9 ML | Refills: 1 | Status: SHIPPED | OUTPATIENT
Start: 2024-09-17

## 2024-09-30 ENCOUNTER — PATIENT MESSAGE (OUTPATIENT)
Dept: ADMINISTRATIVE | Facility: HOSPITAL | Age: 54
End: 2024-09-30
Payer: COMMERCIAL

## 2024-10-15 ENCOUNTER — PATIENT MESSAGE (OUTPATIENT)
Dept: INTERNAL MEDICINE | Facility: CLINIC | Age: 54
End: 2024-10-15
Payer: COMMERCIAL

## 2024-10-15 DIAGNOSIS — I10 ESSENTIAL HYPERTENSION: ICD-10-CM

## 2024-10-15 RX ORDER — LOSARTAN POTASSIUM 25 MG/1
25 TABLET ORAL DAILY
Qty: 90 TABLET | Refills: 3 | Status: SHIPPED | OUTPATIENT
Start: 2024-10-15

## 2024-10-15 NOTE — TELEPHONE ENCOUNTER
Care Due:                  Date            Visit Type   Department     Provider  --------------------------------------------------------------------------------                                MYCHART                              FOLLOWUP/OF  [facility] INTERNAL  Last Visit: 02-      FICE VISIT   MEDICINE       Ryder Herrera  Next Visit: None Scheduled  None         None Found                                                            Last  Test          Frequency    Reason                     Performed    Due Date  --------------------------------------------------------------------------------    CMP.........  12 months..  losartan, metFORMIN,       01- 12-                             pravastatin..............    HBA1C.......  6 months...  metFORMIN................  01- 06-    Lipid Panel.  12 months..  pravastatin..............  01- 12-    [facility] Embedded Care Due Messages. Reference number: [id number].   10/15/2024 2:29:59 PM CDT

## 2024-11-04 ENCOUNTER — HOSPITAL ENCOUNTER (OUTPATIENT)
Dept: RADIOLOGY | Facility: HOSPITAL | Age: 54
Discharge: HOME | End: 2024-11-04
Attending: PHYSICIAN ASSISTANT
Payer: COMMERCIAL

## 2024-11-04 ENCOUNTER — OFFICE VISIT (OUTPATIENT)
Dept: SPORTS MEDICINE | Facility: CLINIC | Age: 54
End: 2024-11-04
Payer: COMMERCIAL

## 2024-11-04 VITALS
SYSTOLIC BLOOD PRESSURE: 133 MMHG | HEART RATE: 76 BPM | HEIGHT: 75 IN | DIASTOLIC BLOOD PRESSURE: 79 MMHG | BODY MASS INDEX: 24.46 KG/M2 | WEIGHT: 196.75 LBS

## 2024-11-04 DIAGNOSIS — M75.102 ROTATOR CUFF SYNDROME, LEFT: Primary | ICD-10-CM

## 2024-11-04 DIAGNOSIS — M25.512 ACUTE PAIN OF LEFT SHOULDER: ICD-10-CM

## 2024-11-04 DIAGNOSIS — M25.512 LEFT SHOULDER PAIN, UNSPECIFIED CHRONICITY: ICD-10-CM

## 2024-11-04 PROCEDURE — 3075F SYST BP GE 130 - 139MM HG: CPT | Mod: CPTII,S$GLB,, | Performed by: PHYSICIAN ASSISTANT

## 2024-11-04 PROCEDURE — 3008F BODY MASS INDEX DOCD: CPT | Mod: CPTII,S$GLB,, | Performed by: PHYSICIAN ASSISTANT

## 2024-11-04 PROCEDURE — 99204 OFFICE O/P NEW MOD 45 MIN: CPT | Mod: S$GLB,,, | Performed by: PHYSICIAN ASSISTANT

## 2024-11-04 PROCEDURE — 73030 X-RAY EXAM OF SHOULDER: CPT | Mod: TC,LT

## 2024-11-04 PROCEDURE — 73030 X-RAY EXAM OF SHOULDER: CPT | Mod: 26,LT,, | Performed by: RADIOLOGY

## 2024-11-04 PROCEDURE — 1160F RVW MEDS BY RX/DR IN RCRD: CPT | Mod: CPTII,S$GLB,, | Performed by: PHYSICIAN ASSISTANT

## 2024-11-04 PROCEDURE — 4010F ACE/ARB THERAPY RXD/TAKEN: CPT | Mod: CPTII,S$GLB,, | Performed by: PHYSICIAN ASSISTANT

## 2024-11-04 PROCEDURE — 1159F MED LIST DOCD IN RCRD: CPT | Mod: CPTII,S$GLB,, | Performed by: PHYSICIAN ASSISTANT

## 2024-11-04 PROCEDURE — 3078F DIAST BP <80 MM HG: CPT | Mod: CPTII,S$GLB,, | Performed by: PHYSICIAN ASSISTANT

## 2024-11-04 PROCEDURE — 99999 PR PBB SHADOW E&M-EST. PATIENT-LVL IV: CPT | Mod: PBBFAC,,, | Performed by: PHYSICIAN ASSISTANT

## 2024-11-04 PROCEDURE — 3044F HG A1C LEVEL LT 7.0%: CPT | Mod: CPTII,S$GLB,, | Performed by: PHYSICIAN ASSISTANT

## 2024-11-04 NOTE — PROGRESS NOTES
Subjective:     Chief Complaint: Filiberto Cohn is a 54 y.o. male who had concerns including Pain of the Left Shoulder.    Patient who is RHD and works as an [employer]  presents to clinic with left shoulder pain x 3-4 months.  Denies a specific mechanism of injury.  He reports noticing a decrease in his range of motion and strength.  During services, which are held 4 times a week, he is having difficulty holding up his left arm.  Pain at rest is 1/10.  Pain at its worst is 7/10.  He has been taking Motrin during the day and Aleve at night.  Pain has become more constant with sleeping over the past month.  He has significant difficulty with over head range of motion. Pain increases with lifting and overhead movements. He is here today to discuss treatment options.        Review of Systems   Constitutional: Negative. Negative for chills, fever, weight gain and weight loss.   HENT:  Negative for congestion and sore throat.    Eyes:  Negative for blurred vision and double vision.   Cardiovascular:  Negative for chest pain, leg swelling and palpitations.   Respiratory:  Negative for cough and shortness of breath.    Hematologic/Lymphatic: Does not bruise/bleed easily.   Skin:  Negative for itching, poor wound healing and rash.   Musculoskeletal:  Positive for joint pain. Negative for back pain, joint swelling, muscle weakness, myalgias and stiffness.   Gastrointestinal:  Negative for abdominal pain, constipation, diarrhea, nausea and vomiting.   Genitourinary: Negative.  Negative for frequency and hematuria.   Neurological:  Negative for dizziness, headaches, numbness, paresthesias and sensory change.   Psychiatric/Behavioral:  Negative for altered mental status and depression. The patient is not nervous/anxious.    Allergic/Immunologic: Negative for hives.       Pain Related Questions  Over the past 3 days, what was your average pain during activity? (I.e. running, jogging, walking, climbing stairs, getting dressed,  ect.): 3  Over the past 3 days, what was your highest pain level?: 5  Over the past 3 days, what was your lowest pain level? : 3    Other  How many nights a week are you awakened by your affected body part?: 3  Was the patient's HEIGHT measured or patient reported?: Patient Reported  Was the patient's WEIGHT measured or patient reported?: Measured    Objective:     General: Filiberto is well-developed, well-nourished, appears stated age, in no acute distress, alert and oriented to time, place and person.     General    Vitals reviewed.  Constitutional: He is oriented to person, place, and time. He appears well-developed and well-nourished. No distress.   HENT:   Head: Normocephalic.   Eyes: EOM are normal.   Cardiovascular:  Normal rate and regular rhythm.            Pulmonary/Chest: Effort normal. No respiratory distress.   Neurological: He is alert and oriented to person, place, and time. He has normal reflexes. No cranial nerve deficit. Coordination normal.   Psychiatric: He has a normal mood and affect. His behavior is normal. Judgment and thought content normal.         Right Shoulder Exam     Inspection/Observation   Swelling: absent  Bruising: absent  Scars: absent  Deformity: absent  Scapular Winging: absent  Scapular Dyskinesia: negative  Atrophy: absent    Tenderness   The patient is experiencing no tenderness.    Range of Motion   Active abduction:  160 normal   Passive abduction:  160 normal   Extension:  30 normal   Forward Flexion:  180 normal   Adduction: 60 normal  External Rotation 0 degrees:  60 normal   External Rotation 90 degrees: 90 normal  Internal rotation 0 degrees:  T6 normal   Internal rotation 90 degrees:  90 normal     Muscle Strength   The patient has normal right shoulder strength.    Tests & Signs   Apprehension: negative  Cross arm: negative  Drop arm: negative  Harris test: negative  Impingement: negative  Sulcus: absent  Lift Off Sign: negative  Active Compression Test (Barren's  Sign): negative  Yergason's Test: negative  Speed's Test: negative  Anterior Drawer Test: 1+   Posterior Drawer Test: 1+    Other   Sensation: normal    Left Shoulder Exam     Inspection/Observation   Swelling: absent  Bruising: absent  Scars: absent  Deformity: absent  Scapular Winging: absent  Scapular Dyskinesia: negative  Atrophy: absent    Tenderness   The patient is tender to palpation of the greater tuberosity.    Range of Motion   Active abduction:  70 (pain) abnormal   Passive abduction:  90 abnormal   Extension:  30 normal   Forward Flexion:  150 (pain) abnormal   Adduction: 40 abnormal  External Rotation 0 degrees:  30 (pain) abnormal   External Rotation 90 degrees: 40 (pain) abnormal  Internal rotation 0 degrees:  L1 (pain) abnormal   Internal rotation 90 degrees:  20 (pain) abnormal     Tests & Signs   Apprehension: positive  Cross arm: positive  Drop arm: negative  Harris test: negative  Impingement: negative  Sulcus: absent  Rotator Cuff Painful Arc/Range: moderate  Lift Off Sign: negative  Active Compression Test (Mahnomen's Sign): positive  Yergasons's Test: negative  Speed's Test: negative  Anterior Drawer Test: 1+  Posterior Drawer Test: 1+    Other   Sensation: normal       Muscle Strength   Right Upper Extremity   Shoulder Abduction: 5/5   Shoulder Internal Rotation: 5/5   Shoulder External Rotation: 5/5   Supraspinatus: 5/5   Subscapularis: 5/5   Biceps: 5/5   Left Upper Extremity  Shoulder Abduction: 4/5   Shoulder Internal Rotation: 5/5   Shoulder External Rotation: 5/5   Supraspinatus: 4/5   Subscapularis: 5/5   Biceps: 5/5     Reflexes     Left Side  Biceps:  2+  Triceps:  2+  Brachioradialis:  2+    Right Side   Biceps:  2+  Triceps:  2+  Brachioradialis:  2+    RADIOGRAPHS: 11/4/24  Left shoulder:  FINDINGS:  Mild DJD.  No fracture or dislocation.  No bone destruction identified.  No soft tissue calcifications.      Assessment:     Encounter Diagnoses   Name Primary?    Acute pain of left  shoulder     Rotator cuff syndrome, left Yes        Plan:     We have discussed a variety of treatment options including medications, injections, physical therapy and other alternative treatments. I also explained the indications, risks and benefits of surgery. Given the patient's hx and examination, we should proceed with MRI of left shoulder at this time. Pt agrees with treatment plan.    I made the decision to obtain old records of the patient including previous notes and imaging. I independently reviewed and interpreted lab results today as well as prior imaging.  Reviewed with patient in detail.    1. MRI left shoulder to assess for rotator cuff pathology.   2. Possible referral to physical therapy for rotator cuff and periscapular strengthening pending MRI results.  3. Ice compress to the affected area 2-3x a day for 15-20 minutes as needed for pain management.  4. OTC NSAIDS PRN for pain management.  5. RTC to see Renee Turner PA-C for follow-up and MRI results. Virtual visit.    All of the patient's questions were answered and the patient will contact us if they have any questions or concerns in the interim.        Patient questionnaires may have been collected.

## 2024-11-07 ENCOUNTER — PATIENT MESSAGE (OUTPATIENT)
Dept: SPORTS MEDICINE | Facility: CLINIC | Age: 54
End: 2024-11-07
Payer: COMMERCIAL

## 2024-12-02 ENCOUNTER — HOSPITAL ENCOUNTER (OUTPATIENT)
Dept: RADIOLOGY | Facility: HOSPITAL | Age: 54
Discharge: HOME | End: 2024-12-02
Attending: PHYSICIAN ASSISTANT
Payer: COMMERCIAL

## 2024-12-02 DIAGNOSIS — M75.102 ROTATOR CUFF SYNDROME, LEFT: ICD-10-CM

## 2024-12-02 DIAGNOSIS — M25.512 ACUTE PAIN OF LEFT SHOULDER: ICD-10-CM

## 2024-12-02 PROCEDURE — 73221 MRI JOINT UPR EXTREM W/O DYE: CPT | Mod: TC,LT

## 2024-12-02 PROCEDURE — 73221 MRI JOINT UPR EXTREM W/O DYE: CPT | Mod: 26,LT,, | Performed by: RADIOLOGY

## 2024-12-04 ENCOUNTER — TELEPHONE (OUTPATIENT)
Dept: SPORTS MEDICINE | Facility: CLINIC | Age: 54
End: 2024-12-04

## 2024-12-04 ENCOUNTER — OFFICE VISIT (OUTPATIENT)
Dept: SPORTS MEDICINE | Facility: CLINIC | Age: 54
End: 2024-12-04
Payer: COMMERCIAL

## 2024-12-04 DIAGNOSIS — M75.02 ADHESIVE CAPSULITIS OF LEFT SHOULDER: ICD-10-CM

## 2024-12-04 DIAGNOSIS — G89.29 CHRONIC LEFT SHOULDER PAIN: ICD-10-CM

## 2024-12-04 DIAGNOSIS — M75.102 ROTATOR CUFF SYNDROME, LEFT: Primary | ICD-10-CM

## 2024-12-04 DIAGNOSIS — M25.512 CHRONIC LEFT SHOULDER PAIN: ICD-10-CM

## 2024-12-04 PROCEDURE — 99214 OFFICE O/P EST MOD 30 MIN: CPT | Mod: 95,,, | Performed by: PHYSICIAN ASSISTANT

## 2024-12-04 PROCEDURE — 1160F RVW MEDS BY RX/DR IN RCRD: CPT | Mod: CPTII,95,, | Performed by: PHYSICIAN ASSISTANT

## 2024-12-04 PROCEDURE — 4010F ACE/ARB THERAPY RXD/TAKEN: CPT | Mod: CPTII,95,, | Performed by: PHYSICIAN ASSISTANT

## 2024-12-04 PROCEDURE — 3044F HG A1C LEVEL LT 7.0%: CPT | Mod: CPTII,95,, | Performed by: PHYSICIAN ASSISTANT

## 2024-12-04 PROCEDURE — 1159F MED LIST DOCD IN RCRD: CPT | Mod: CPTII,95,, | Performed by: PHYSICIAN ASSISTANT

## 2024-12-04 NOTE — TELEPHONE ENCOUNTER
Spoke to patient in regard to 8wk f/u with Renee Turner PA-C . The patient accepted an in-person visit on 01/29/2025 at the [facility].

## 2024-12-04 NOTE — PROGRESS NOTES
Telemedicine/Virtual Visit Documentation:   History of Present Illness    CHIEF COMPLAINT:  - Filiberto presents today for follow-up regarding shoulder pain and to review MRI results.    HPI:  Filiberto presents for follow-up of a shoulder injury, reporting difficulty holding his arm up overhead, particularly when working as a  and holding his arm out. He has had pain and limited range of motion in his shoulder. He is taking Aleve once daily. Initially, he had stopped taking the medication as he did not like taking additional pills, but upon trying again, he noticed a significant improvement.    The patient location is: home    The chief complaint leading to consultation is: see HPI from 11/4/24  Patient who is RHD and works as an [employer]  presents to clinic with left shoulder pain x 3-4 months. Denies a specific mechanism of injury. He reports noticing a decrease in his range of motion and strength. During services, which are held 4 times a week, he is having difficulty holding up his left arm. Pain at rest is 1/10. Pain at its worst is 7/10. He has been taking Motrin during the day and Aleve at night. Pain has become more constant with sleeping over the past month. He has significant difficulty with over head range of motion. Pain increases with lifting and overhead movements. He is here today to discuss treatment options.     VISIT TYPE X   Virtual visit with synchronous audio and video X   Telephone E/M service      Total time spent with patient: see X jean-paul on chart below.   More than half of the time was spent counseling or coordinating care including prognosis, differential diagnosis, risks and benefits of treatment, instructions, compliance risk reductions     EST MINUTES X   29520 5    06198 10    06947 15    04931 25 X   99215 40    NEW     43800 10    87473 20    70253 30    40521 45    50281 60    PHONE      5-10    02970 11-20    01595 21-30        MRI left shoulder: 12/2/24  FINDINGS:  ROTATOR  CUFF: Supraspinatus tendinosis with a small (0.6 cm AP), partial-thickness (approximately 50%) intrasubstance/concealed footprint tear with questionable bursal surface extension.  Infraspinatus tendinosis.  Subscapularis and teres minor tendons are intact.  Muscle bulk is preserved.     LABRUM: Abnormal signal intensity of the superior labrum extending from anterior to posterior.  Remaining labral segments demonstrate preserved morphology and signal intensity.     BICEPS: Thickening and increased intrasubstance signal of the intra-articular biceps.     BONES: No acute fractures.  No avascular necrosis.  No infiltrative process.     AC JOINT: Mild AC joint arthrosis.  Flat morphology of the lateral acromion with mild undersurface spurring.     CARTILAGE: Intact without partial or full-thickness defects.     MISCELLANEOUS: Small joint effusion.  Mild rotator interval synovitis.  Mild thickening and edema of the inferior glenohumeral ligament.  Fluid distention of the subacromial/subdeltoid bursa.  No axillary lymphadenopathy.     Assessment:  Left rotator cuff syndrome  Left shoulder adhesive capsulitis  Left chronic shoulder pain    Plan:    MEDICATIONS PRESCRIBED:  - Recommend continuing naproxen daily in the morning for pain management.    REFERRALS:  - Referred to physical therapy for improving shoulder strength and range of motion, alleviating pain, and addressing adhesive capsulitis. Filiberto to attend physical therapy sessions at the [facility].    FOLLOW UP:  - Follow up in 8 weeks after completing physical therapy to reassess shoulder condition and progress. Assess if further treatment or potential surgery is needed based on improvement.          This note was generated with the assistance of ambient listening technology. Verbal consent was obtained by the patient and accompanying visitor(s) for the recording of patient appointment to facilitate this note. I attest to having reviewed and edited the  generated note for accuracy, though some syntax or spelling errors may persist. Please contact the author of this note for any clarification.

## 2024-12-05 ENCOUNTER — CLINICAL SUPPORT (OUTPATIENT)
Dept: REHABILITATION | Facility: HOSPITAL | Age: 54
End: 2024-12-05
Payer: COMMERCIAL

## 2024-12-05 DIAGNOSIS — M75.102 ROTATOR CUFF SYNDROME, LEFT: ICD-10-CM

## 2024-12-05 DIAGNOSIS — G89.29 CHRONIC LEFT SHOULDER PAIN: ICD-10-CM

## 2024-12-05 DIAGNOSIS — M25.612 DECREASED ROM OF LEFT SHOULDER: Primary | ICD-10-CM

## 2024-12-05 DIAGNOSIS — M25.512 CHRONIC LEFT SHOULDER PAIN: ICD-10-CM

## 2024-12-05 DIAGNOSIS — M75.02 ADHESIVE CAPSULITIS OF LEFT SHOULDER: ICD-10-CM

## 2024-12-05 PROCEDURE — 97530 THERAPEUTIC ACTIVITIES: CPT

## 2024-12-05 PROCEDURE — 97140 MANUAL THERAPY 1/> REGIONS: CPT

## 2024-12-05 PROCEDURE — 97161 PT EVAL LOW COMPLEX 20 MIN: CPT

## 2024-12-05 NOTE — PROGRESS NOTES
[facility]   Physical Therapy Initial Evaluation      Name: Filiberto Cohn  [facility] Number: [id number]    Therapy Diagnosis:   Encounter Diagnoses   Name Primary?    Rotator cuff syndrome, left     Adhesive capsulitis of left shoulder     Chronic left shoulder pain     Decreased ROM of left shoulder Yes        Physician: Renee Turner PA-C    Physician Orders: PT Eval and Treat   Medical Diagnosis from Referral:  Rotator cuff syndrome, left [M75.102], Adhesive capsulitis of left shoulder [M75.02], Chronic left shoulder pain [M25.512, G89.29]   Evaluation Date: 2024  Authorization Period Expiration: 2025  Plan of Care Expiration: 2/10/2025  Progress Note Due: 2025    Date of Surgery: n/a    Visit # / Visits authorized:    FOTO: 1/ 3    Precautions: Standard     Time In: 2:15 PM  Time Out: 3:00 PM  Total Billable Time: 45 minutes    Subjective     Date of onset: 4-5 months ago    History of current condition - Ulisses reports: he has had some left shoulder pain for a while now after reaching into the back of his car a couple months ago and feeling a sharp pain in his shoulder. He states he will feel stiffness from time to time and does some stretching to help relieve this. Patient states he now has inability to hold his arm up while saying [employer] at [employer] due to weakness with holding his arm up in abduction for more than 5-7 seconds. This has been affecting his work abilities and with reaching/lifting overhead. He denies numbness/tingling and does not associate neck pain with shoulder pain.     Falls: none    [id number]  MRI      Impression:    1. Supraspinatus tendinosis with a small, partial-thickness intrasubstance/concealed footprint tear with questionable bursal surface extension. No full-thickness tear or retraction.  2. Infraspinatus tendinosis.  3. Degenerative fraying of the superior labrum.  4. Biceps tendinosis.  5. Joint effusion with synovitis.  6.  Subacromial/subdeltoid bursitis.  7. Mild AC joint arthrosis and subacromial spurring.  8. Mild rotator interval synovitis and mild thickening and edema of the inferior glenohumeral ligament, findings that can be seen with adhesive capsulitis.     Prior Therapy: not for this condition  Social History:  lives with their spouse  Occupation:  at [employer]   Prior Level of Function: indep prior to injury   Current Level of Function:  difficulty with job duties, difficulty with overhead reaching and lifting     Pain:  Current 3/10, worst 7/10, best 1/10   Location: left shoulder anterior and posterior   Description: Aching, Dull, Throbbing, and Grabbing  Aggravating Factors: Extension, Flexing, and Lifting  Easing Factors: nothing    Patients goals: be able to hold arms up to his side for mass, be able to reach overhead into cabinets      Medical History:   Past Medical History:   Diagnosis Date    Diabetes mellitus type II     Hyperlipidemia     Hypertension        Surgical History:   Filiberto Cohn  has a past surgical history that includes Trigger finger release (Left); Colonoscopy (N/A, 03/29/2022); Colonoscopy (N/A, 12/01/2023); Appendectomy (June, 1987); and Tonsillectomy (February, 1980).    Medications:   Filiberto has a current medication list which includes the following prescription(s): bd ultra-fine sofia pen needle, cetirizine, dexcom g7 sensor, dextroamphetamine-amphetamine, fluticasone propionate, gvoke hypopen 2-pack, toujeo solostar u-300 insulin, lyumjev kwikpen u-100 insulin, losartan, metformin, pravastatin, and ozempic.    Allergies:   Review of patient's allergies indicates:  No Known Allergies     Objective        Observation: alert, oriented, calm    Posture: depressed Left scapula, downwardly rotated left scapula    Passive Range of Motion:   Shoulder Right Left   Flexion 180 150 *   Abduction 175 100 *   ER at 0 55 40 *   ER at 90 95 70 *   IR 30 20    *denotes pain     Active  "Range of Motion:   Shoulder Right Left   Flexion 175 130 *   Abduction 175 90 *   ER at 0 55 40 *   ER at 90 NT NT   IR 30 20   Reach behind head Full, pain-free Limited, painful   Reach behind back  Full, pain-free Limited, painful      Strength:  Shoulder Right Left   Flexion 4+/5 3+/5 *   Abduction 4/5 3/5 *   ER 4+/5 3/5 *   IR 4+/5 4/5   Serratus Anterior 4-/5 NT   Middle Trap 4/5 NT   Low Trap 4-/5 NT       Special Tests:  Impingement Cluster:   Right Left   Hawkin's Kenndy - +   Painful Arc - +   Resisted ER - +     Rotator Cuff Tear   Right Left   Painful Arc - +   Drop Arm test - -   Resisted ER - +         Joint Mobility: limited posterior/inferior GH jt     Palpation: TTP at bicipital groove and supraspinatus insertion    Sensation: intact light touch C4-T1 bilaterally       Intake Outcome Measure for FOTO Shoulder Survey    Therapist reviewed FOTO scores for Fiilberto Cohn on 12/5/2024.   FOTO report - see Media section or FOTO account episode details.    Intake Score: 53%         Treatment     Total Treatment time (time-based codes) separate from Evaluation: 25 minutes     Ulisses received the treatments listed below:      manual therapy techniques: Joint mobilizations and Soft tissue Mobilization were applied to the: left shoulder for 10 minutes, including:    GH jt distraction, grades I/II  Posterior/inferior mobs grades II/III      therapeutic activities to improve functional performance for 15 minutes, including:    HEP:  Isometrics 5 x 10" holds in ER, IR and Abduction  Wall slides for serratus activation and overhead reaching, 2 x 12  Upper trap shrugs, 10 x 10 sec holds     Education:   - POC/Prognosis  - Role of PT   - Activity modifications     Patient Education and Home Exercises     Education provided:   - HEP   - as above     Written Home Exercises Provided: Yes. Exercises were reviewed and Ulisses was able to demonstrate them prior to the end of the session.  Ulisses demonstrated good  understanding of " the education provided. See EMR under Patient Instructions for exercises provided during therapy sessions.    Assessment     Filiberto is a 54 y.o. male referred to outpatient Physical Therapy with a medical diagnosis of  Rotator cuff syndrome, left [M75.102], Adhesive capsulitis of left shoulder [M75.02], Chronic left shoulder pain [M25.512, G89.29]. Patient presents with signs and symptoms consistent with rotator cuff tear and shoulder impingement. Patient has decreased left shoulder range of motion, decreased strength, decreased overhead reaching ability, and decreased tolerance to work activities and ADLs due to shoulder pain. Patient would benefit form skilled therapy in order to improve upon these deficits and return to piror level of function.     Patient prognosis is Good.   Patient will benefit from skilled outpatient Physical Therapy to address the deficits stated above and in the chart below, provide patient /family education, and to maximize patientt's level of independence.     Plan of care discussed with patient: Yes  Patient's spiritual, cultural and educational needs considered and patient is agreeable to the plan of care and goals as stated below:     Anticipated Barriers for therapy: none    Medical Necessity is demonstrated by the following  History  Co-morbidities and personal factors that may impact the plan of care [x] LOW: no personal factors / co-morbidities  [] MODERATE: 1-2 personal factors / co-morbidities  [] HIGH: 3+ personal factors / co-morbidities    Moderate / High Support Documentation:   Co-morbidities affecting plan of care: HLD    Personal Factors:   no deficits     Examination  Body Structures and Functions, activity limitations and participation restrictions that may impact the plan of care [] LOW: addressing 1-2 elements  [x] MODERATE: 3+ elements  [] HIGH: 4+ elements (please support below)    Moderate / High Support Documentation: see assessment above     Clinical Presentation  [x] LOW: stable  [] MODERATE: Evolving  [] HIGH: Unstable     Decision Making/ Complexity Score: low       Goals:  Short Term Goals:  4 weeks  1.Report decreased left shoulder pain < / =  2/10 to increase tolerance for progressing exercises in therapy.   2. Increase PROM to 175 degr of flexion, 50 degr ER, and 25 degr IR pain-free in order to show improved joint mobility.   3. Increased strength by 1/3 MMT grade in left shoulder flexion, ER, and abduction to increase tolerance for ADL and work activities.  4. Pt to tolerate HEP to improve ROM and independence with ADL's    Long Term Goals: 8 weeks  1.Report decreased left shoulder pain  < / =  1/10  to increase tolerance for progressing reaching and lifting exercises.   2.Increase AROM to flexion 170 degr, abduction 170 degr, and ER 55 degr pain-free in order to show ability to reach overhead.   3.Increase strength to >/= 4/5 in left shoulder flexion, ER, and abduction to increase tolerance for ADL and work activities.  4. Pt goal: be able to hold arms up to his side for mass, be able to reach overhead into cabinets.   5. Pt will have improved score of >/= 70% on FOTO shoulder in order to demonstrate true functional improvement.   Plan     Plan of care Certification: 12/5/2024 to 2/10/2025.    Outpatient Physical Therapy 2 times weekly for 8 weeks to include the following interventions: Manual Therapy, Moist Heat/ Ice, Neuromuscular Re-ed, Patient Education, Therapeutic Activities, and Therapeutic Exercise.     Julian Pulido, PT        Physician's Signature: _________________________________________ Date: ________________

## 2024-12-08 NOTE — PLAN OF CARE
[facility]   Physical Therapy Initial Evaluation      Name: Filiberto Cohn  [facility] Number: [id number]    Therapy Diagnosis:   Encounter Diagnoses   Name Primary?    Rotator cuff syndrome, left     Adhesive capsulitis of left shoulder     Chronic left shoulder pain     Decreased ROM of left shoulder Yes        Physician: Renee Turner PA-C    Physician Orders: PT Eval and Treat   Medical Diagnosis from Referral:  Rotator cuff syndrome, left [M75.102], Adhesive capsulitis of left shoulder [M75.02], Chronic left shoulder pain [M25.512, G89.29]   Evaluation Date: 2024  Authorization Period Expiration: 2025  Plan of Care Expiration: 2/10/2025  Progress Note Due: 2025    Date of Surgery: n/a    Visit # / Visits authorized:    FOTO: 1/ 3    Precautions: Standard     Time In: 2:15 PM  Time Out: 3:00 PM  Total Billable Time: 45 minutes    Subjective     Date of onset: 4-5 months ago    History of current condition - Ulisses reports: he has had some left shoulder pain for a while now after reaching into the back of his car a couple months ago and feeling a sharp pain in his shoulder. He states he will feel stiffness from time to time and does some stretching to help relieve this. Patient states he now has inability to hold his arm up while [employer] due to weakness with holding his arm up in abduction for more than 5-7 seconds. This has been affecting his work abilities and with reaching/lifting overhead. He denies numbness/tingling and does not associate neck pain with shoulder pain.     Falls: none    [id number]  MRI      Impression:    1. Supraspinatus tendinosis with a small, partial-thickness intrasubstance/concealed footprint tear with questionable bursal surface extension. No full-thickness tear or retraction.  2. Infraspinatus tendinosis.  3. Degenerative fraying of the superior labrum.  4. Biceps tendinosis.  5. Joint effusion with synovitis.  6.  Subacromial/subdeltoid bursitis.  7. Mild AC joint arthrosis and subacromial spurring.  8. Mild rotator interval synovitis and mild thickening and edema of the inferior glenohumeral ligament, findings that can be seen with adhesive capsulitis.     Prior Therapy: not for this condition  Social History:  lives with their spouse  Occupation:  at [employer]   Prior Level of Function: indep prior to injury   Current Level of Function:  difficulty with job duties, difficulty with overhead reaching and lifting     Pain:  Current 3/10, worst 7/10, best 1/10   Location: left shoulder anterior and posterior   Description: Aching, Dull, Throbbing, and Grabbing  Aggravating Factors: Extension, Flexing, and Lifting  Easing Factors: nothing    Patients goals: be able to hold arms up to his side for mass, be able to reach overhead into cabinets      Medical History:   Past Medical History:   Diagnosis Date    Diabetes mellitus type II     Hyperlipidemia     Hypertension        Surgical History:   Filiberto Cohn  has a past surgical history that includes Trigger finger release (Left); Colonoscopy (N/A, 03/29/2022); Colonoscopy (N/A, 12/01/2023); Appendectomy (June, 1987); and Tonsillectomy (February, 1980).    Medications:   Filiberto has a current medication list which includes the following prescription(s): bd ultra-fine sofia pen needle, cetirizine, dexcom g7 sensor, dextroamphetamine-amphetamine, fluticasone propionate, gvoke hypopen 2-pack, toujeo solostar u-300 insulin, lyumjev kwikpen u-100 insulin, losartan, metformin, pravastatin, and ozempic.    Allergies:   Review of patient's allergies indicates:  No Known Allergies     Objective        Observation: alert, oriented, calm    Posture: depressed Left scapula, downwardly rotated left scapula    Passive Range of Motion:   Shoulder Right Left   Flexion 180 150 *   Abduction 175 100 *   ER at 0 55 40 *   ER at 90 95 70 *   IR 30 20    *denotes pain     Active  "Range of Motion:   Shoulder Right Left   Flexion 175 130 *   Abduction 175 90 *   ER at 0 55 40 *   ER at 90 NT NT   IR 30 20   Reach behind head Full, pain-free Limited, painful   Reach behind back  Full, pain-free Limited, painful      Strength:  Shoulder Right Left   Flexion 4+/5 3+/5 *   Abduction 4/5 3/5 *   ER 4+/5 3/5 *   IR 4+/5 4/5   Serratus Anterior 4-/5 NT   Middle Trap 4/5 NT   Low Trap 4-/5 NT       Special Tests:  Impingement Cluster:   Right Left   Hawkin's Kenndy - +   Painful Arc - +   Resisted ER - +     Rotator Cuff Tear   Right Left   Painful Arc - +   Drop Arm test - -   Resisted ER - +         Joint Mobility: limited posterior/inferior GH jt     Palpation: TTP at bicipital groove and supraspinatus insertion    Sensation: intact light touch C4-T1 bilaterally       Intake Outcome Measure for FOTO Shoulder Survey    Therapist reviewed FOTO scores for Filiberto Cohn on 12/5/2024.   FOTO report - see Media section or FOTO account episode details.    Intake Score: 53%         Treatment     Total Treatment time (time-based codes) separate from Evaluation: 25 minutes     Ulisses received the treatments listed below:      manual therapy techniques: Joint mobilizations and Soft tissue Mobilization were applied to the: left shoulder for 10 minutes, including:    GH jt distraction, grades I/II  Posterior/inferior mobs grades II/III      therapeutic activities to improve functional performance for 15 minutes, including:    HEP:  Isometrics 5 x 10" holds in ER, IR and Abduction  Wall slides for serratus activation and overhead reaching, 2 x 12  Upper trap shrugs, 10 x 10 sec holds     Education:   - POC/Prognosis  - Role of PT   - Activity modifications     Patient Education and Home Exercises     Education provided:   - HEP   - as above     Written Home Exercises Provided: Yes. Exercises were reviewed and Ulisses was able to demonstrate them prior to the end of the session.  Ulisses demonstrated good  understanding of " the education provided. See EMR under Patient Instructions for exercises provided during therapy sessions.    Assessment     Filiberto is a 54 y.o. male referred to outpatient Physical Therapy with a medical diagnosis of  Rotator cuff syndrome, left [M75.102], Adhesive capsulitis of left shoulder [M75.02], Chronic left shoulder pain [M25.512, G89.29]. Patient presents with signs and symptoms consistent with rotator cuff tear and shoulder impingement. Patient has decreased left shoulder range of motion, decreased strength, decreased overhead reaching ability, and decreased tolerance to work activities and ADLs due to shoulder pain. Patient would benefit form skilled therapy in order to improve upon these deficits and return to piror level of function.     Patient prognosis is Good.   Patient will benefit from skilled outpatient Physical Therapy to address the deficits stated above and in the chart below, provide patient /family education, and to maximize patientt's level of independence.     Plan of care discussed with patient: Yes  Patient's spiritual, cultural and educational needs considered and patient is agreeable to the plan of care and goals as stated below:     Anticipated Barriers for therapy: none    Medical Necessity is demonstrated by the following  History  Co-morbidities and personal factors that may impact the plan of care [x] LOW: no personal factors / co-morbidities  [] MODERATE: 1-2 personal factors / co-morbidities  [] HIGH: 3+ personal factors / co-morbidities    Moderate / High Support Documentation:   Co-morbidities affecting plan of care: HLD    Personal Factors:   no deficits     Examination  Body Structures and Functions, activity limitations and participation restrictions that may impact the plan of care [] LOW: addressing 1-2 elements  [x] MODERATE: 3+ elements  [] HIGH: 4+ elements (please support below)    Moderate / High Support Documentation: see assessment above     Clinical Presentation  [x] LOW: stable  [] MODERATE: Evolving  [] HIGH: Unstable     Decision Making/ Complexity Score: low       Goals:  Short Term Goals:  4 weeks  1.Report decreased left shoulder pain < / =  2/10 to increase tolerance for progressing exercises in therapy.   2. Increase PROM to 175 degr of flexion, 50 degr ER, and 25 degr IR pain-free in order to show improved joint mobility.   3. Increased strength by 1/3 MMT grade in left shoulder flexion, ER, and abduction to increase tolerance for ADL and work activities.  4. Pt to tolerate HEP to improve ROM and independence with ADL's    Long Term Goals: 8 weeks  1.Report decreased left shoulder pain  < / =  1/10  to increase tolerance for progressing reaching and lifting exercises.   2.Increase AROM to flexion 170 degr, abduction 170 degr, and ER 55 degr pain-free in order to show ability to reach overhead.   3.Increase strength to >/= 4/5 in left shoulder flexion, ER, and abduction to increase tolerance for ADL and work activities.  4. Pt goal: be able to hold arms up to his side for mass, be able to reach overhead into cabinets.   5. Pt will have improved score of >/= 70% on FOTO shoulder in order to demonstrate true functional improvement.   Plan     Plan of care Certification: 12/5/2024 to 2/10/2025.    Outpatient Physical Therapy 2 times weekly for 8 weeks to include the following interventions: Manual Therapy, Moist Heat/ Ice, Neuromuscular Re-ed, Patient Education, Therapeutic Activities, and Therapeutic Exercise.     Julian Pulido, PT        Physician's Signature: _________________________________________ Date: ________________

## 2024-12-09 ENCOUNTER — CLINICAL SUPPORT (OUTPATIENT)
Dept: REHABILITATION | Facility: HOSPITAL | Age: 54
End: 2024-12-09
Payer: COMMERCIAL

## 2024-12-09 DIAGNOSIS — M25.612 DECREASED ROM OF LEFT SHOULDER: Primary | ICD-10-CM

## 2024-12-09 PROCEDURE — 97140 MANUAL THERAPY 1/> REGIONS: CPT

## 2024-12-09 PROCEDURE — 97110 THERAPEUTIC EXERCISES: CPT

## 2024-12-09 PROCEDURE — 97112 NEUROMUSCULAR REEDUCATION: CPT

## 2024-12-09 NOTE — PROGRESS NOTES
"[facility]   Physical Therapy Treatment Note      Name: Filiberto Cohn  [facility] Number: [id number]    Therapy Diagnosis:   Encounter Diagnosis   Name Primary?    Decreased ROM of left shoulder Yes     Physician: Renee Turner PA-C    Visit Date: 12/9/2024      Physician Orders: PT Eval and Treat   Medical Diagnosis from Referral:  Rotator cuff syndrome, left [M75.102], Adhesive capsulitis of left shoulder [M75.02], Chronic left shoulder pain [M25.512, G89.29]   Evaluation Date: 12/5/2024  Authorization Period Expiration: 12/31/2024  Plan of Care Expiration: 2/10/2025  Progress Note Due: 1/5/2025     Date of Surgery: n/a     Visit # / Visits authorized: 1/10  FOTO: 1/ 3     Precautions: Standard      Time In: 2:36 PM  Time Out: 3:30 PM  Total Billable Time: 54 minutes    Subjective     Patient reports: shoulder is feeling slightly better. Feels better and less sore after his exercises.     He was compliant with home exercise program.    Response to previous treatment: first follow up   Functional change: first follow up     Pain: 3/10  Location: left shoulder    Objective      Objective Measures updated at progress report unless specified.     Treatment     Ulisses received the treatments listed below:      therapeutic exercises to develop strength, endurance, ROM, and flexibility for 09 minutes including:    Table slides in scaption plane, 3 mins   Seated pulleys in scaption, 3 mins   Seated thoracic extension, 2 x 20   HEP update    manual therapy techniques: Joint mobilizations and Soft tissue Mobilization were applied to the: left shoulder for 09 minutes, including:    GH jt distraction, grades I/II  Posterior/inferior mobs grades II/III    neuromuscular re-education activities to improve: Coordination, Sense, Proprioception, and Posture for 36 minutes. The following activities were included:    Isometrics 5 x 10" holds in ER, IR and Abduction  Wall slides for serratus activation and " "overhead reaching, 2 x 12  Upper trap shrugs, 10 x 10 sec holds   Sidelying serratus punch with 3" protraction hold, 3 x 10   Sidelying UT shrugs, 5 x 10 sec holds   Sidelying mid trap scap retraction, 5 x 10 sec holds     therapeutic activities to improve functional performance for 00  minutes, including:      Patient Education and Home Exercises       Education provided:   - HEP     Written Home Exercises Provided: Pt instructed to continue prior HEP. Exercises were reviewed and Ulisses was able to demonstrate them prior to the end of the session.  Ulisses demonstrated good  understanding of the education provided. See Electronic Medical Record under Patient Instructions for exercises provided during therapy sessions    Assessment     Mr. Gtz returns for his first follow up with slight improvement following initial evaluation. He has been conducting home exercises with good tolerance and progresses well today with thoracic spine mobility and continued light rotator cuff resisted activities. Patient to continue to progress as tolerated and work into active flexion/abduction endurance.     Ulisses Is progressing well towards his goals.     Patient prognosis is Good.     Patient will continue to benefit from skilled outpatient physical therapy to address the deficits listed in the problem list box on initial evaluation, provide pt/family education and to maximize pt's level of independence in the home and community environment.     Patient's spiritual, cultural and educational needs considered and pt agreeable to plan of care and goals.     Anticipated barriers to physical therapy: none    Goals:   Short Term Goals:  4 weeks  1.Report decreased left shoulder pain < / =  2/10 to increase tolerance for progressing exercises in therapy.   2. Increase PROM to 175 degr of flexion, 50 degr ER, and 25 degr IR pain-free in order to show improved joint mobility.   3. Increased strength by 1/3 MMT grade in left shoulder flexion, ER, and " abduction to increase tolerance for ADL and work activities.  4. Pt to tolerate HEP to improve ROM and independence with ADL's     Long Term Goals: 8 weeks  1.Report decreased left shoulder pain  < / =  1/10  to increase tolerance for progressing reaching and lifting exercises.   2.Increase AROM to flexion 170 degr, abduction 170 degr, and ER 55 degr pain-free in order to show ability to reach overhead.   3.Increase strength to >/= 4/5 in left shoulder flexion, ER, and abduction to increase tolerance for ADL and work activities.  4. Pt goal: be able to hold arms up to his side for mass, be able to reach overhead into cabinets.   5. Pt will have improved score of >/= 70% on FOTO shoulder in order to demonstrate true functional improvement.     Plan     Plan of care Certification: 12/5/2024 to 2/10/2025.     Outpatient Physical Therapy 2 times weekly for 8 weeks to include the following interventions: Manual Therapy, Moist Heat/ Ice, Neuromuscular Re-ed, Patient Education, Therapeutic Activities, and Therapeutic Exercise.       Julian Pulido, PT

## 2024-12-12 ENCOUNTER — CLINICAL SUPPORT (OUTPATIENT)
Dept: REHABILITATION | Facility: HOSPITAL | Age: 54
End: 2024-12-12
Payer: COMMERCIAL

## 2024-12-12 DIAGNOSIS — M25.612 DECREASED ROM OF LEFT SHOULDER: Primary | ICD-10-CM

## 2024-12-12 PROCEDURE — 97140 MANUAL THERAPY 1/> REGIONS: CPT

## 2024-12-12 PROCEDURE — 97110 THERAPEUTIC EXERCISES: CPT

## 2024-12-12 PROCEDURE — 97112 NEUROMUSCULAR REEDUCATION: CPT

## 2024-12-12 NOTE — PROGRESS NOTES
[facility]   Physical Therapy Treatment Note      Name: Filiberto Cohn  [facility] Number: [id number]    Therapy Diagnosis:   Encounter Diagnosis   Name Primary?    Decreased ROM of left shoulder Yes       Physician: Renee Turner PA-C    Visit Date: 12/12/2024      Physician Orders: PT Eval and Treat   Medical Diagnosis from Referral:  Rotator cuff syndrome, left [M75.102], Adhesive capsulitis of left shoulder [M75.02], Chronic left shoulder pain [M25.512, G89.29]   Evaluation Date: 12/5/2024  Authorization Period Expiration: 12/31/2024  Plan of Care Expiration: 2/10/2025  Progress Note Due: 1/5/2025     Date of Surgery: n/a     Visit # / Visits authorized: 2/10  FOTO: 1/ 3     Precautions: Standard      Time In: 2:00 PM  Time Out: 2:55 PM  Total Billable Time: 55 minutes    Subjective     Patient reports: shoulder is feeling slightly better. Able to put his jacket on a little easier.     He was compliant with home exercise program.    Response to previous treatment: mild soreness  Functional change: ongoing     Pain: 3/10  Location: left shoulder    Objective      Objective Measures updated at progress report unless specified.     Treatment     Ulisses received the treatments listed below:      therapeutic exercises to develop strength, endurance, ROM, and flexibility for 09 minutes including:    Table slides in scaption plane, 3 mins   Sidelying thoracic rotation, 2 x 20   Seated thoracic extension, 2 x 15     Not performed today:  Seated pulleys in scaption, 3 mins   HEP update    manual therapy techniques: Joint mobilizations and Soft tissue Mobilization were applied to the: left shoulder for 08 minutes, including:    GH jt distraction, grades I/II  Posterior/inferior mobs grades II/III  Scapular upward rotation, grades II/III    neuromuscular re-education activities to improve: Coordination, Sense, Proprioception, and Posture for 38 minutes. The following activities were  "included:    Isometrics 5 x 10" holds in ER, IR and Abduction   Wall slides for serratus activation and overhead reaching, 2 x 12  Sidelying serratus protraction, 3 x 8 with 3" holds using dowel   IR walkouts with GTB, 2 x 20   ER walkouts with OTB, 2 x 20     Not performed today:  Upper trap shrugs, 10 x 10 sec holds   Sidelying UT shrugs, 5 x 10 sec holds   Sidelying mid trap scap retraction, 5 x 10 sec holds     therapeutic activities to improve functional performance for 00  minutes, including:      Patient Education and Home Exercises       Education provided:   - HEP     Written Home Exercises Provided: Pt instructed to continue prior HEP. Exercises were reviewed and Ulisses was able to demonstrate them prior to the end of the session.  Ulisses demonstrated good  understanding of the education provided. See Electronic Medical Record under Patient Instructions for exercises provided during therapy sessions    Assessment     Mr. Gtz returns with subjective improvement in shoulder pain and pain-free passive internal rotation.  He continues to have pain with passive ER and passive flexion beyond 120 degrees. Shows improvement following joint mobilizations and RTC activation. Tolerated addition of submaximal resisted isometrics well today. Patient to continue to progress as tolerated and work into active flexion/abduction endurance.     Ulisses Is progressing well towards his goals.     Patient prognosis is Good.     Patient will continue to benefit from skilled outpatient physical therapy to address the deficits listed in the problem list box on initial evaluation, provide pt/family education and to maximize pt's level of independence in the home and community environment.     Patient's spiritual, cultural and educational needs considered and pt agreeable to plan of care and goals.     Anticipated barriers to physical therapy: none    Goals:   Short Term Goals:  4 weeks  1.Report decreased left shoulder pain < / =  2/10 to " increase tolerance for progressing exercises in therapy.   2. Increase PROM to 175 degr of flexion, 50 degr ER, and 25 degr IR pain-free in order to show improved joint mobility.   3. Increased strength by 1/3 MMT grade in left shoulder flexion, ER, and abduction to increase tolerance for ADL and work activities.  4. Pt to tolerate HEP to improve ROM and independence with ADL's     Long Term Goals: 8 weeks  1.Report decreased left shoulder pain  < / =  1/10  to increase tolerance for progressing reaching and lifting exercises.   2.Increase AROM to flexion 170 degr, abduction 170 degr, and ER 55 degr pain-free in order to show ability to reach overhead.   3.Increase strength to >/= 4/5 in left shoulder flexion, ER, and abduction to increase tolerance for ADL and work activities.  4. Pt goal: be able to hold arms up to his side for mass, be able to reach overhead into cabinets.   5. Pt will have improved score of >/= 70% on FOTO shoulder in order to demonstrate true functional improvement.     Plan     Plan of care Certification: 12/5/2024 to 2/10/2025.     Outpatient Physical Therapy 2 times weekly for 8 weeks to include the following interventions: Manual Therapy, Moist Heat/ Ice, Neuromuscular Re-ed, Patient Education, Therapeutic Activities, and Therapeutic Exercise.       Julian Pulido, PT

## 2024-12-16 ENCOUNTER — CLINICAL SUPPORT (OUTPATIENT)
Dept: REHABILITATION | Facility: HOSPITAL | Age: 54
End: 2024-12-16
Payer: COMMERCIAL

## 2024-12-16 DIAGNOSIS — M25.612 DECREASED ROM OF LEFT SHOULDER: Primary | ICD-10-CM

## 2024-12-16 PROCEDURE — 97110 THERAPEUTIC EXERCISES: CPT

## 2024-12-16 PROCEDURE — 97140 MANUAL THERAPY 1/> REGIONS: CPT

## 2024-12-16 PROCEDURE — 97112 NEUROMUSCULAR REEDUCATION: CPT

## 2024-12-16 NOTE — PROGRESS NOTES
[facility]   Physical Therapy Treatment Note      Name: Filiberto Cohn  [facility] Number: [id number]    Therapy Diagnosis:   Encounter Diagnosis   Name Primary?    Decreased ROM of left shoulder Yes       Physician: Renee Turner PA-C    Visit Date: 12/16/2024    Physician Orders: PT Eval and Treat   Medical Diagnosis from Referral:  Rotator cuff syndrome, left [M75.102], Adhesive capsulitis of left shoulder [M75.02], Chronic left shoulder pain [M25.512, G89.29]   Evaluation Date: 12/5/2024  Authorization Period Expiration: 12/31/2024  Plan of Care Expiration: 2/10/2025  Progress Note Due: 1/5/2025     Date of Surgery: n/a     Visit # / Visits authorized:  3/10  FOTO: 1/ 3     Precautions: Standard      Time In: 1:30 PM  Time Out: 2:30 PM  Total Billable Time: 60 minutes    Subjective     Patient reports: shoulder is feeling slightly better.     He was compliant with home exercise program.    Response to previous treatment: mild soreness  Functional change: ongoing     Pain: 3/10  Location: left shoulder    Objective      Objective Measures updated at progress report unless specified.     Treatment     Ulisses received the treatments listed below:      therapeutic exercises to develop strength, endurance, ROM, and flexibility for 08 minutes including:    Seated pulleys in scaption, 3 mins   Supine ER wand AAROM, 2 x 20   Sidelying flexion with dowel syed, 2 x 10   HEP update    Not performed today:  Table slides in scaption plane, 3 mins   Sidelying thoracic rotation, 2 x 20   Seated thoracic extension, 2 x 15     manual therapy techniques: Joint mobilizations and Soft tissue Mobilization were applied to the: left shoulder for 08 minutes, including:    GH jt distraction, grades I/II  Posterior/inferior mobs grades II/III  Scapular upward rotation, grades II/III    neuromuscular re-education activities to improve: Coordination, Sense, Proprioception, and Posture for 44 minutes. The following  "activities were included:    Upper trap shrugs, 10 x 10 sec holds   Wall slides for serratus activation and overhead reaching, 2 x 12  ER/IR with GTB manual resisted perturbations, 2 x 10   Sidelying serratus protraction, 3 x 8 with 3" holds using dowel   IR walkouts with GTB, 2 x 20   ER walkouts with OTB, 2 x 20   Unilateral serratus wall slides, 2 x 12     Not performed today:  Sidelying UT shrugs, 5 x 10 sec holds  Isometrics 5 x 10" holds in ER, IR and Abduction    Sidelying mid trap scap retraction, 5 x 10 sec holds     therapeutic activities to improve functional performance for 00  minutes, including:      Patient Education and Home Exercises       Education provided:   - HEP     Written Home Exercises Provided: Pt instructed to continue prior HEP. Exercises were reviewed and Ulisses was able to demonstrate them prior to the end of the session.  Ulisses demonstrated good  understanding of the education provided. See Electronic Medical Record under Patient Instructions for exercises provided during therapy sessions    Assessment     Mr. Gtz returns with decreased shoulder flexion active flexion and ER passively. He has improved elevation ability with scapular assist. Shows improvement in capsular mobility within session and able to achieve full elevation with pulleys. Tolerates submaximal resisted isometrics well today. Patient to continue to progress as tolerated and work into active flexion/abduction endurance.     Ulisses Is progressing well towards his goals.     Patient prognosis is Good.     Patient will continue to benefit from skilled outpatient physical therapy to address the deficits listed in the problem list box on initial evaluation, provide pt/family education and to maximize pt's level of independence in the home and community environment.     Patient's spiritual, cultural and educational needs considered and pt agreeable to plan of care and goals.     Anticipated barriers to physical therapy: " none    Goals:   Short Term Goals:  4 weeks  1.Report decreased left shoulder pain < / =  2/10 to increase tolerance for progressing exercises in therapy.   2. Increase PROM to 175 degr of flexion, 50 degr ER, and 25 degr IR pain-free in order to show improved joint mobility.   3. Increased strength by 1/3 MMT grade in left shoulder flexion, ER, and abduction to increase tolerance for ADL and work activities.  4. Pt to tolerate HEP to improve ROM and independence with ADL's     Long Term Goals: 8 weeks  1.Report decreased left shoulder pain  < / =  1/10  to increase tolerance for progressing reaching and lifting exercises.   2.Increase AROM to flexion 170 degr, abduction 170 degr, and ER 55 degr pain-free in order to show ability to reach overhead.   3.Increase strength to >/= 4/5 in left shoulder flexion, ER, and abduction to increase tolerance for ADL and work activities.  4. Pt goal: be able to hold arms up to his side for mass, be able to reach overhead into cabinets.   5. Pt will have improved score of >/= 70% on FOTO shoulder in order to demonstrate true functional improvement.     Plan     Plan of care Certification: 12/5/2024 to 2/10/2025.     Outpatient Physical Therapy 2 times weekly for 8 weeks to include the following interventions: Manual Therapy, Moist Heat/ Ice, Neuromuscular Re-ed, Patient Education, Therapeutic Activities, and Therapeutic Exercise.       Julian Pulido, PT

## 2024-12-19 ENCOUNTER — CLINICAL SUPPORT (OUTPATIENT)
Dept: REHABILITATION | Facility: HOSPITAL | Age: 54
End: 2024-12-19
Payer: COMMERCIAL

## 2024-12-19 DIAGNOSIS — M25.612 DECREASED ROM OF LEFT SHOULDER: Primary | ICD-10-CM

## 2024-12-19 PROCEDURE — 97112 NEUROMUSCULAR REEDUCATION: CPT

## 2024-12-19 PROCEDURE — 97110 THERAPEUTIC EXERCISES: CPT

## 2024-12-19 PROCEDURE — 97140 MANUAL THERAPY 1/> REGIONS: CPT

## 2024-12-19 NOTE — PROGRESS NOTES
[facility]   Physical Therapy Treatment Note and Progress Note     Name: Filiberto Cohn  [facility] Number: [id number]    Therapy Diagnosis:   Encounter Diagnosis   Name Primary?    Decreased ROM of left shoulder Yes         Physician: Renee Turner PA-C    Visit Date: 12/19/2024    Physician Orders: PT Eval and Treat   Medical Diagnosis from Referral:  Rotator cuff syndrome, left [M75.102], Adhesive capsulitis of left shoulder [M75.02], Chronic left shoulder pain [M25.512, G89.29]   Evaluation Date: 12/5/2024  Authorization Period Expiration: 12/31/2024  Plan of Care Expiration: 2/10/2025  Progress Note Due: 1/5/2025     Date of Surgery: n/a     Visit # / Visits authorized:  4/10  FOTO: 1/ 3     Precautions: Standard      Time In: 2:00 PM  Time Out: 3:00 PM  Total Billable Time: 60 minutes    Subjective     Patient reports: shoulder is feeling slightly better. Has some soreness after holding the guitar last night but was able to play for 30-40 minutes.      He was compliant with home exercise program.    Response to previous treatment: mild soreness  Functional change: ongoing     Pain: 3/10  Location: left shoulder    Objective      Re-assessment (12/19/2024)     Passive Range of Motion:   Shoulder Right Left   Flexion 180 155 *   Abduction 175 105 *   ER at 0 55 45    ER at 90 95 75   IR 30 20    *denotes pain      Active Range of Motion:   Shoulder Right Left   Flexion 175 135 *   Abduction 175 95 *   ER at 0 55 45   ER at 90 NT NT   IR 30 20   Reach behind head Full, pain-free Near full, intense stretch   Reach behind back  Full, pain-free Limited, painful    *denotes pain     Strength:  Shoulder Right Left   Flexion 4+/5 3+/5    Abduction 4/5 3+/5    ER 4+/5 3+/5    IR 4+/5 4/5   Serratus Anterior 4-/5 NT   Middle Trap 4/5 NT   Low Trap 4-/5 NT        Joint Mobility: limited posterior/inferior GH jt mobility   Treatment     Ulisses received the treatments listed below:      therapeutic  "exercises to develop strength, endurance, ROM, and flexibility for 18 minutes including:    Re-assessment (noted above)   Seated pulleys in flexion and scaption, 3 mins each   Supine ER resisted GTB, 2 x 20   Supine AAROM flexion with 2 lb dowel syed, 2 x 10   HEP update    Not performed today:  Table slides in scaption plane, 3 mins   Sidelying thoracic rotation, 2 x 20   Seated thoracic extension, 2 x 15     manual therapy techniques: Joint mobilizations and Soft tissue Mobilization were applied to the: left shoulder for 08 minutes, including:    GH jt distraction, grades I/II  Posterior/inferior mobs grades II/III  Scapular upward rotation, grades II/III    neuromuscular re-education activities to improve: Coordination, Sense, Proprioception, and Posture for 34 minutes. The following activities were included:    Wall slides for serratus activation and overhead reaching, 2 x 12  ER/IR with GTB manual resisted perturbations, 2 x 10   Sidelying serratus protraction, 3 x 8 with 3" holds using dowel   IR walkouts with GTB, 2 x 20   ER walkouts with OTB, 2 x 20   Supine shoulder protraction at 90, raise to 110 with 1# ball, 2 x 20   Sidelying ER 0#,  2 x 12     Not performed today:  Sidelying UT shrugs, 5 x 10 sec holds  Upper trap shrugs, 10 x 10 sec holds   Isometrics 5 x 10" holds in ER, IR and Abduction    Sidelying mid trap scap retraction, 5 x 10 sec holds     therapeutic activities to improve functional performance for 00  minutes, including:      Patient Education and Home Exercises       Education provided:   - HEP     Written Home Exercises Provided: Pt instructed to continue prior HEP. Exercises were reviewed and Ulisses was able to demonstrate them prior to the end of the session.  Ulisses demonstrated good  understanding of the education provided. See Electronic Medical Record under Patient Instructions for exercises provided during therapy sessions    Assessment     Mr. Gtz returns for his re-assessment and shows " improved active and flexion motion as well as reduced pain with passive motion and strength testing. Patient to continue working on improving RTC strength and reducing impingement factors/symptoms. HEP updated. Patient to continue to progress as tolerated and work into active flexion/abduction endurance.     Ulisses Is progressing well towards his goals.     Patient prognosis is Good.     Patient will continue to benefit from skilled outpatient physical therapy to address the deficits listed in the problem list box on initial evaluation, provide pt/family education and to maximize pt's level of independence in the home and community environment.     Patient's spiritual, cultural and educational needs considered and pt agreeable to plan of care and goals.     Anticipated barriers to physical therapy: none    Goals:   Short Term Goals:  4 weeks  1.Report decreased left shoulder pain < / =  2/10 to increase tolerance for progressing exercises in therapy.   2. Increase PROM to 175 degr of flexion, 50 degr ER, and 25 degr IR pain-free in order to show improved joint mobility.   3. Increased strength by 1/3 MMT grade in left shoulder flexion, ER, and abduction to increase tolerance for ADL and work activities.  4. Pt to tolerate HEP to improve ROM and independence with ADL's     Long Term Goals: 8 weeks  1.Report decreased left shoulder pain  < / =  1/10  to increase tolerance for progressing reaching and lifting exercises.   2.Increase AROM to flexion 170 degr, abduction 170 degr, and ER 55 degr pain-free in order to show ability to reach overhead.   3.Increase strength to >/= 4/5 in left shoulder flexion, ER, and abduction to increase tolerance for ADL and work activities.  4. Pt goal: be able to hold arms up to his side for mass, be able to reach overhead into cabinets.   5. Pt will have improved score of >/= 70% on FOTO shoulder in order to demonstrate true functional improvement.     Plan     Plan of care Certification:  12/5/2024 to 2/10/2025.     Outpatient Physical Therapy 2 times weekly for 8 weeks to include the following interventions: Manual Therapy, Moist Heat/ Ice, Neuromuscular Re-ed, Patient Education, Therapeutic Activities, and Therapeutic Exercise.       Julian Pulido, PT

## 2024-12-23 ENCOUNTER — CLINICAL SUPPORT (OUTPATIENT)
Dept: REHABILITATION | Facility: HOSPITAL | Age: 54
End: 2024-12-23
Payer: COMMERCIAL

## 2024-12-23 ENCOUNTER — PATIENT MESSAGE (OUTPATIENT)
Dept: REHABILITATION | Facility: HOSPITAL | Age: 54
End: 2024-12-23

## 2024-12-23 DIAGNOSIS — M25.612 DECREASED ROM OF LEFT SHOULDER: Primary | ICD-10-CM

## 2024-12-23 PROCEDURE — 97112 NEUROMUSCULAR REEDUCATION: CPT

## 2024-12-23 PROCEDURE — 97110 THERAPEUTIC EXERCISES: CPT

## 2024-12-23 PROCEDURE — 97140 MANUAL THERAPY 1/> REGIONS: CPT

## 2024-12-23 NOTE — PROGRESS NOTES
[facility]   Physical Therapy Treatment Note     Name: Filiberto Cohn  [facility] Number: [id number]    Therapy Diagnosis:   Encounter Diagnosis   Name Primary?    Decreased ROM of left shoulder Yes       Physician: Renee Turner PA-C    Visit Date: 12/23/2024    Physician Orders: PT Eval and Treat   Medical Diagnosis from Referral:  Rotator cuff syndrome, left [M75.102], Adhesive capsulitis of left shoulder [M75.02], Chronic left shoulder pain [M25.512, G89.29]   Evaluation Date: 12/5/2024  Authorization Period Expiration: 12/31/2024  Plan of Care Expiration: 2/10/2025  Progress Note Due: 1/5/2025     Date of Surgery: n/a     Visit # / Visits authorized:  5/10  FOTO: 1/ 3     Precautions: Standard      Time In: 2:38 PM  Time Out: 3:38 PM  Total Billable Time: 55 minutes    Subjective     Patient reports: shoulder is feeling ok but he was in the attic over the weekend and lifting things that temporarily made his shoulder worse. He regressed to doing isometrics at home after this and is feeling a little better today.     He was compliant with home exercise program.    Response to previous treatment: mild soreness  Functional change: ongoing     Pain: 3/10  Location: left shoulder    Objective      Objective Measures updated at progress report unless specified.     Treatment     Ulisses received the treatments listed below:      therapeutic exercises to develop strength, endurance, ROM, and flexibility for 08 minutes including:    Sidelying thoracic rotation, 2 x 20   Seated thoracic extension, 2 x 15     Not performed today:  Table slides in scaption plane, 3 mins   Seated pulleys in flexion and scaption, 3 mins each   Supine ER resisted GTB, 2 x 20   HEP update  Supine AAROM flexion with 2 lb dowel syed, 2 x 10     manual therapy techniques: Joint mobilizations and Soft tissue Mobilization were applied to the: left shoulder for 08 minutes, including:    GH jt distraction, grades  "I/II  Posterior/inferior mobs grades II/III  Scapular upward rotation, grades II/III    neuromuscular re-education activities to improve: Coordination, Sense, Proprioception, and Posture for 39 minutes. The following activities were included:    Flexion isometrics 5 x 10 sec holds  Wall slides for serratus activation and overhead reaching, 2 x 12  Sidelying serratus protraction, 3 x 8 with 3" holds using dowel   IR walkouts with GTB, 2 x 20   ER walkouts with OTB, 2 x 20   Upper trap shrugs, 10 x 10 sec holds   Single UE wall slides with UT shrug at end-range, 2 x 15     Not performed today:  Sidelying UT shrugs, 5 x 10 sec holds  Supine shoulder protraction at 90, raise to 110 with 1# ball, 2 x 20   Sidelying ER 0#,  2 x 12   ER/IR with GTB manual resisted perturbations, 2 x 10   Isometrics 5 x 10" holds in ER, IR and Abduction    Sidelying mid trap scap retraction, 5 x 10 sec holds     therapeutic activities to improve functional performance for 00  minutes, including:      Patient Education and Home Exercises       Education provided:   - HEP     Written Home Exercises Provided: Pt instructed to continue prior HEP. Exercises were reviewed and Ulissse was able to demonstrate them prior to the end of the session.  Ulisses demonstrated good  understanding of the education provided. See Electronic Medical Record under Patient Instructions for exercises provided during therapy sessions    Assessment     Mr. Gtz returns with near full active and passive left shoulder flexion motion as well as no pain with ER strength testing at neutral. Continues to work on thoracic mobility and RTC activation. Shows reduction is symptoms with scapular elevation and upward rotation. Patient to continue to progress as tolerated and work into active flexion/abduction endurance.     Ulisses Is progressing well towards his goals.     Patient prognosis is Good.     Patient will continue to benefit from skilled outpatient physical therapy to address the " deficits listed in the problem list box on initial evaluation, provide pt/family education and to maximize pt's level of independence in the home and community environment.     Patient's spiritual, cultural and educational needs considered and pt agreeable to plan of care and goals.     Anticipated barriers to physical therapy: none    Goals:   Short Term Goals:  4 weeks  1.Report decreased left shoulder pain < / =  2/10 to increase tolerance for progressing exercises in therapy.   2. Increase PROM to 175 degr of flexion, 50 degr ER, and 25 degr IR pain-free in order to show improved joint mobility.   3. Increased strength by 1/3 MMT grade in left shoulder flexion, ER, and abduction to increase tolerance for ADL and work activities.  4. Pt to tolerate HEP to improve ROM and independence with ADL's     Long Term Goals: 8 weeks  1.Report decreased left shoulder pain  < / =  1/10  to increase tolerance for progressing reaching and lifting exercises.   2.Increase AROM to flexion 170 degr, abduction 170 degr, and ER 55 degr pain-free in order to show ability to reach overhead.   3.Increase strength to >/= 4/5 in left shoulder flexion, ER, and abduction to increase tolerance for ADL and work activities.  4. Pt goal: be able to hold arms up to his side for mass, be able to reach overhead into cabinets.   5. Pt will have improved score of >/= 70% on FOTO shoulder in order to demonstrate true functional improvement.     Plan     Plan of care Certification: 12/5/2024 to 2/10/2025.     Outpatient Physical Therapy 2 times weekly for 8 weeks to include the following interventions: Manual Therapy, Moist Heat/ Ice, Neuromuscular Re-ed, Patient Education, Therapeutic Activities, and Therapeutic Exercise.       Julian Pulido, PT

## 2024-12-31 ENCOUNTER — CLINICAL SUPPORT (OUTPATIENT)
Dept: REHABILITATION | Facility: HOSPITAL | Age: 54
End: 2024-12-31
Payer: COMMERCIAL

## 2024-12-31 DIAGNOSIS — M25.612 DECREASED ROM OF LEFT SHOULDER: Primary | ICD-10-CM

## 2024-12-31 PROCEDURE — 97112 NEUROMUSCULAR REEDUCATION: CPT

## 2024-12-31 PROCEDURE — 97110 THERAPEUTIC EXERCISES: CPT

## 2024-12-31 NOTE — PROGRESS NOTES
[facility]   Physical Therapy Treatment Note and Progress Note     Name: Filiberto Cohn  [facility] Number: [id number]    Therapy Diagnosis:   Encounter Diagnosis   Name Primary?    Decreased ROM of left shoulder Yes         Physician: Renee Turner PA-C    Visit Date: 12/31/2024    Physician Orders: PT Eval and Treat   Medical Diagnosis from Referral:  Rotator cuff syndrome, left [M75.102], Adhesive capsulitis of left shoulder [M75.02], Chronic left shoulder pain [M25.512, G89.29]   Evaluation Date: 12/5/2024  Authorization Period Expiration: 12/31/2024  Plan of Care Expiration: 2/10/2025  Progress Note Due: 1/5/2025     Date of Surgery: n/a     Visit # / Visits authorized:  6/10  FOTO: 1/ 3     Precautions: Standard      Time In: 11:00 AM   Time Out: 11:56 AM   Total Billable Time: 56 minutes    Subjective     Patient reports: shoulder is feeling a lot better. Not having much pain as of lately.     He was compliant with home exercise program.    Response to previous treatment: mild soreness  Functional change: ongoing     Pain: 1/10  Location: left shoulder    Objective      Re-assessment (12/31/2024)     Passive Range of Motion:   Shoulder Right Left   Flexion 180 160 *   Abduction 175 120 *   ER at 0 55 45 *   ER at 90 95 70 *   IR 30 20    *denotes pain      Active Range of Motion:   Shoulder Right Left   Flexion 175 170    Abduction 175 140 *   ER at 0 55 45 *   IR 30 20   Reach behind head Full, pain-free Near full, minimally painful   Reach behind back  Full, pain-free Near full, minimally painful       *denotes pain     Strength:  Shoulder Right Left   Flexion 4+/5 4-/5    Abduction 4/5 3+/5 *   ER 4+/5 3+/5    IR 4+/5 4/5    *denotes pain      Joint Mobility: mild limitation in posterior and inferior GH capsule Left shoulder      Treatment     Ulisses received the treatments listed below:      therapeutic exercises to develop strength, endurance, ROM, and flexibility for 11 minutes  "including:    Re-assessment (noted above)   Supine wand flexion AAROM, 2 x 15 with 2 lb dowel   Seated pulleys in flexion and scaption, 3 mins each     Not performed today:  Table slides in scaption plane, 3 mins   Sidelying thoracic rotation, 2 x 20   Seated thoracic extension, 2 x 15   Supine ER resisted GTB, 2 x 20   HEP update  Supine AAROM flexion with 2 lb dowel syed, 2 x 10     manual therapy techniques: Joint mobilizations and Soft tissue Mobilization were applied to the: left shoulder for 06 minutes, including:    GH jt distraction, grades I/II  Posterior/inferior mobs grades II/III  Scapular upward rotation, grades II/III    neuromuscular re-education activities to improve: Coordination, Sense, Proprioception, and Posture for 39 minutes. The following activities were included:    Flexion and Abduction isometrics, 5 x 10 sec holds  Wall slides for serratus activation and overhead reaching, 2 x 12  Prone scap retraction, mid trap cueing, 3 x 10 with 3" holds   IR walkouts with BTB, 2 x 20   ER walkouts with OTB, 2 x 20    Sidelying ER, 2 x 15   Upper trap shrugs, 10 x 10 sec holds   Single UE wall slides with UT shrug at end-range, 2 x 15     Not performed today:  Sidelying UT shrugs, 5 x 10 sec holds  Sidelying serratus protraction, 3 x 8 with 3" holds using dowel   Supine shoulder protraction at 90, raise to 110 with 1# ball, 2 x 20   Sidelying ER 0#,  2 x 12   ER/IR with GTB manual resisted perturbations, 2 x 10   Isometrics 5 x 10" holds in ER, IR and Abduction    Sidelying mid trap scap retraction, 5 x 10 sec holds     therapeutic activities to improve functional performance for 00  minutes, including:      Patient Education and Home Exercises       Education provided:   - HEP     Written Home Exercises Provided: Pt instructed to continue prior HEP. Exercises were reviewed and Ulisses was able to demonstrate them prior to the end of the session.  Ulisses demonstrated good  understanding of the education " provided. See Electronic Medical Record under Patient Instructions for exercises provided during therapy sessions    Assessment     Mr. Gtz presents for his re-assessment and has made good improvement with demonstration of pain-free flexion of left shoulder to 170 degrees. He was able to progress a few RTC specific exercises today without pain and shows slight pain with Abduction and ER manual muscle testing. He shows good potential for continual improvement with strengthening of RTC and periscapular region. Patient has met most of his short term goals and remains a good candidate to continue to progress towards his goals with therapy.     Ulisses Is progressing well towards his goals.     Patient prognosis is Good.     Patient will continue to benefit from skilled outpatient physical therapy to address the deficits listed in the problem list box on initial evaluation, provide pt/family education and to maximize pt's level of independence in the home and community environment.     Patient's spiritual, cultural and educational needs considered and pt agreeable to plan of care and goals.     Anticipated barriers to physical therapy: none    Goals:   Short Term Goals:  4 weeks  1.Report decreased left shoulder pain < / =  2/10 to increase tolerance for progressing exercises in therapy. - MET (12/31/2024)   2. Increase PROM to 175 degr of flexion, 50 degr ER, and 25 degr IR pain-free in order to show improved joint mobility.  - Partially Met  3. Increased strength by 1/3 MMT grade in left shoulder flexion, ER, and abduction to increase tolerance for ADL and work activities. - MET (12/31/2024)   4. Pt to tolerate HEP to improve ROM and independence with ADL's - MET (12/31/2024)      Long Term Goals: 8 weeks  1.Report decreased left shoulder pain  < / =  1/10  to increase tolerance for progressing reaching and lifting exercises.  - Progressing, Not Met   2.Increase AROM to flexion 170 degr, abduction 170 degr, and ER 55 degr  pain-free in order to show ability to reach overhead.  - Progressing, Not Met   3.Increase strength to >/= 4/5 in left shoulder flexion, ER, and abduction to increase tolerance for ADL and work activities. - Progressing, Not Met   4. Pt goal: be able to hold arms up to his side for mass, be able to reach overhead into cabinets. - Progressing, Not Met   5. Pt will have improved score of >/= 70% on FOTO shoulder in order to demonstrate true functional improvement.   - Progressing, Not Met     Plan     Plan of care Certification: 12/5/2024 to 2/10/2025.     Outpatient Physical Therapy 2 times weekly for 8 weeks to include the following interventions: Manual Therapy, Moist Heat/ Ice, Neuromuscular Re-ed, Patient Education, Therapeutic Activities, and Therapeutic Exercise.       Julian Pulido, PT

## 2025-01-02 ENCOUNTER — CLINICAL SUPPORT (OUTPATIENT)
Dept: REHABILITATION | Facility: HOSPITAL | Age: 55
End: 2025-01-02
Payer: COMMERCIAL

## 2025-01-02 DIAGNOSIS — M25.612 DECREASED ROM OF LEFT SHOULDER: Primary | ICD-10-CM

## 2025-01-02 PROCEDURE — 97140 MANUAL THERAPY 1/> REGIONS: CPT

## 2025-01-02 PROCEDURE — 97112 NEUROMUSCULAR REEDUCATION: CPT

## 2025-01-02 PROCEDURE — 97110 THERAPEUTIC EXERCISES: CPT

## 2025-01-02 NOTE — PROGRESS NOTES
[facility]   Physical Therapy Treatment Note     Name: Filiberto Cohn  [facility] Number: [id number]    Therapy Diagnosis:   Encounter Diagnosis   Name Primary?    Decreased ROM of left shoulder Yes         Physician: Renee Turner PA-C    Visit Date: 1/2/2025    Physician Orders: PT Eval and Treat   Medical Diagnosis from Referral:  Rotator cuff syndrome, left [M75.102], Adhesive capsulitis of left shoulder [M75.02], Chronic left shoulder pain [M25.512, G89.29]   Evaluation Date: 12/5/2024  Authorization Period Expiration: 12/31/2025  Plan of Care Expiration: 2/10/2025  Progress Note Due: 1/5/2025     Date of Surgery: n/a     Visit # / Visits authorized:  1/20  FOTO: 1/ 3     Precautions: Standard      Time In: 1:00 PM    Time Out: 2:00 PM   Total Billable Time: 60 minutes    Subjective     Patient reports: shoulder is feeling good but new exercises make it sore also.     He was compliant with home exercise program.    Response to previous treatment: mild soreness  Functional change: ongoing     Pain: 1/10  Location: left shoulder    Objective      Objective measures to be updated at progress note.      Treatment     Ulisses received the treatments listed below:      therapeutic exercises to develop strength, endurance, ROM, and flexibility for 10 minutes including:    Supine wand flexion AAROM, 2 x 15 with 2 lb dowel   Seated pulleys in flexion and scaption, 3 mins each     Not performed today:  Table slides in scaption plane, 3 mins   Sidelying thoracic rotation, 2 x 20   Seated thoracic extension, 2 x 15   Supine ER resisted GTB, 2 x 20   HEP update    manual therapy techniques: Joint mobilizations and Soft tissue Mobilization were applied to the: left shoulder for 08 minutes, including:    GH jt distraction, grades I/II  Posterior/inferior mobs grades II/III  Scap pinning with manual flexion   Scapular upward rotation, grades II/III    neuromuscular re-education activities to improve:  "Coordination, Sense, Proprioception, and Posture for 42 minutes. The following activities were included:    Flexion and Abduction isometrics, 5 x 10 sec holds  Wall slides for serratus activation and overhead reaching, 2 x 12  IR walkouts with lift to 45 degr BTB, 2 x 20   ER walkouts with lift to 45 degr GTB, 2 x 20     Upper trap shrugs, 10 x 10 sec holds   Single UE wall slides with UT shrug at end-range, 2 x 15   Sidelying ER 0# 10 reps and 1#, 2 x 10  Scaption lift 0#, 3 x 10   Supine serratus punch with 500 gr ball, 2 x 20   Single UE wall slides, 2 x 20     Not performed today:  Sidelying UT shrugs, 5 x 10 sec holds  Sidelying serratus protraction, 3 x 8 with 3" holds using dowel   Supine shoulder protraction at 90, raise to 110 with 1# ball, 2 x 20   Sidelying ER 0#,  2 x 12   ER/IR with GTB manual resisted perturbations, 2 x 10   Isometrics 5 x 10" holds in ER, IR and Abduction    Sidelying mid trap scap retraction, 5 x 10 sec holds     therapeutic activities to improve functional performance for 00  minutes, including:      Patient Education and Home Exercises       Education provided:   - HEP     Written Home Exercises Provided: Pt instructed to continue prior HEP. Exercises were reviewed and Ulisses was able to demonstrate them prior to the end of the session.  Ulisses demonstrated good  understanding of the education provided. See Electronic Medical Record under Patient Instructions for exercises provided during therapy sessions    Assessment     Mr. Gtz presents with stiffness actively into 170 degrees.  Able to improve ability with scaption lifting and with serratus/upper trap endurance. Patient also able to progress dynamic RTC walkouts. Patient to continue to progress towards his goals with therapy.     Ulisses Is progressing well towards his goals.     Patient prognosis is Good.     Patient will continue to benefit from skilled outpatient physical therapy to address the deficits listed in the problem list box " on initial evaluation, provide pt/family education and to maximize pt's level of independence in the home and community environment.     Patient's spiritual, cultural and educational needs considered and pt agreeable to plan of care and goals.     Anticipated barriers to physical therapy: none    Goals:   Short Term Goals:  4 weeks  1.Report decreased left shoulder pain < / =  2/10 to increase tolerance for progressing exercises in therapy. - MET (12/31/2024)   2. Increase PROM to 175 degr of flexion, 50 degr ER, and 25 degr IR pain-free in order to show improved joint mobility.  - Partially Met  3. Increased strength by 1/3 MMT grade in left shoulder flexion, ER, and abduction to increase tolerance for ADL and work activities. - MET (12/31/2024)   4. Pt to tolerate HEP to improve ROM and independence with ADL's - MET (12/31/2024)      Long Term Goals: 8 weeks  1.Report decreased left shoulder pain  < / =  1/10  to increase tolerance for progressing reaching and lifting exercises.  - Progressing, Not Met   2.Increase AROM to flexion 170 degr, abduction 170 degr, and ER 55 degr pain-free in order to show ability to reach overhead.  - Progressing, Not Met   3.Increase strength to >/= 4/5 in left shoulder flexion, ER, and abduction to increase tolerance for ADL and work activities. - Progressing, Not Met   4. Pt goal: be able to hold arms up to his side for mass, be able to reach overhead into cabinets. - Progressing, Not Met   5. Pt will have improved score of >/= 70% on FOTO shoulder in order to demonstrate true functional improvement.   - Progressing, Not Met     Plan     Plan of care Certification: 12/5/2024 to 2/10/2025.     Outpatient Physical Therapy 2 times weekly for 8 weeks to include the following interventions: Manual Therapy, Moist Heat/ Ice, Neuromuscular Re-ed, Patient Education, Therapeutic Activities, and Therapeutic Exercise.       Julian Pulido, PT

## 2025-01-09 ENCOUNTER — CLINICAL SUPPORT (OUTPATIENT)
Dept: REHABILITATION | Facility: HOSPITAL | Age: 55
End: 2025-01-09
Payer: COMMERCIAL

## 2025-01-09 DIAGNOSIS — M25.612 DECREASED ROM OF LEFT SHOULDER: Primary | ICD-10-CM

## 2025-01-09 PROCEDURE — 97112 NEUROMUSCULAR REEDUCATION: CPT

## 2025-01-09 PROCEDURE — 97110 THERAPEUTIC EXERCISES: CPT

## 2025-01-09 PROCEDURE — 97140 MANUAL THERAPY 1/> REGIONS: CPT

## 2025-01-09 NOTE — PROGRESS NOTES
[facility]   Physical Therapy Treatment Note     Name: Filiberto Cohn  [facility] Number: [id number]    Therapy Diagnosis:   Encounter Diagnosis   Name Primary?    Decreased ROM of left shoulder Yes       Physician: Renee Turner PA-C    Visit Date: 1/9/2025    Physician Orders: PT Eval and Treat   Medical Diagnosis from Referral:  Rotator cuff syndrome, left [M75.102], Adhesive capsulitis of left shoulder [M75.02], Chronic left shoulder pain [M25.512, G89.29]   Evaluation Date: 12/5/2024  Authorization Period Expiration: 12/31/2025  Plan of Care Expiration: 2/10/2025  Progress Note Due: 1/5/2025     Date of Surgery: n/a     Visit # / Visits authorized:  2/20  FOTO: 1/ 3     Precautions: Standard      Time In: 1:00 PM    Time Out: 2:00 PM   Total Billable Time: 60 minutes    Subjective     Patient reports: shoulder is feeling good but has trouble reaching behind his back to get into his coat/jacket.     He was compliant with home exercise program.    Response to previous treatment: mild soreness  Functional change: ongoing     Pain: 1/10  Location: left shoulder    Objective      Objective measures to be updated at progress note.      Treatment     Ulisses received the treatments listed below:      therapeutic exercises to develop strength, endurance, ROM, and flexibility for 10 minutes including:    Supine wand flexion AAROM, 2 x 15 with 2 lb dowel   Seated pulleys in flexion and scaption, 3 mins each     Not performed today:  Table slides in scaption plane, 3 mins   Sidelying thoracic rotation, 2 x 20   Seated thoracic extension, 2 x 15   Supine ER resisted GTB, 2 x 20   HEP update    manual therapy techniques: Joint mobilizations and Soft tissue Mobilization were applied to the: left shoulder for 08 minutes, including:    GH jt distraction, grades I/II  Posterior/inferior mobs grades II/III  Scap pinning with manual flexion   Scapular upward rotation, grades II/III    neuromuscular  "re-education activities to improve: Coordination, Sense, Proprioception, and Posture for 42 minutes. The following activities were included:    Wall slides for serratus activation and overhead reaching, 2 x 12  IR walkouts with lift to 80 degr BTB, 20x  IR active at 45 flexion, BTB, 20x   ER walkouts with lift to 60 degr GTB, 2 x 20    ER with 45 flexion, RTB, 2 x 10    Upper trap shrugs with 2# DB, 3 x 10 sec holds   Prone mid trap holds level 3, 2 x 10 with 3" holds   Sidelying 1# and 2#, 3 x 10  Scaption lift 1# Left UE and 2# Right UE, 2 x 10     Not performed today:  Flexion and Abduction isometrics, 5 x 10 sec holds  Sidelying UT shrugs, 5 x 10 sec holds  Sidelying serratus protraction, 3 x 8 with 3" holds using dowel   Supine shoulder protraction at 90, raise to 110 with 1# ball, 2 x 20   Sidelying ER 0#,  2 x 12   ER/IR with GTB manual resisted perturbations, 2 x 10   Isometrics 5 x 10" holds in ER, IR and Abduction    Sidelying mid trap scap retraction, 5 x 10 sec holds     therapeutic activities to improve functional performance for 00  minutes, including:      Patient Education and Home Exercises       Education provided:   - HEP     Written Home Exercises Provided: Pt instructed to continue prior HEP. Exercises were reviewed and Ulisses was able to demonstrate them prior to the end of the session.  Ulisses demonstrated good  understanding of the education provided. See Electronic Medical Record under Patient Instructions for exercises provided during therapy sessions    Assessment     Mr. Gtz presents with stiffness actively into 170 degrees. Improved with IR reaching behind his back motion.  Able to improve resistance with scaption lifting and with serratus/upper trap endurance. Patient also able to progress dynamic RTC walkouts to higher level of flexion with band. Patient to continue to progress towards his goals with therapy.     Ulisses Is progressing well towards his goals.     Patient prognosis is Good. "     Patient will continue to benefit from skilled outpatient physical therapy to address the deficits listed in the problem list box on initial evaluation, provide pt/family education and to maximize pt's level of independence in the home and community environment.     Patient's spiritual, cultural and educational needs considered and pt agreeable to plan of care and goals.     Anticipated barriers to physical therapy: none    Goals:   Short Term Goals:  4 weeks  1.Report decreased left shoulder pain < / =  2/10 to increase tolerance for progressing exercises in therapy. - MET (12/31/2024)   2. Increase PROM to 175 degr of flexion, 50 degr ER, and 25 degr IR pain-free in order to show improved joint mobility.  - Partially Met  3. Increased strength by 1/3 MMT grade in left shoulder flexion, ER, and abduction to increase tolerance for ADL and work activities. - MET (12/31/2024)   4. Pt to tolerate HEP to improve ROM and independence with ADL's - MET (12/31/2024)      Long Term Goals: 8 weeks  1.Report decreased left shoulder pain  < / =  1/10  to increase tolerance for progressing reaching and lifting exercises.  - Progressing, Not Met   2.Increase AROM to flexion 170 degr, abduction 170 degr, and ER 55 degr pain-free in order to show ability to reach overhead.  - Progressing, Not Met   3.Increase strength to >/= 4/5 in left shoulder flexion, ER, and abduction to increase tolerance for ADL and work activities. - Progressing, Not Met   4. Pt goal: be able to hold arms up to his side for mass, be able to reach overhead into cabinets. - Progressing, Not Met   5. Pt will have improved score of >/= 70% on FOTO shoulder in order to demonstrate true functional improvement.   - Progressing, Not Met     Plan     Plan of care Certification: 12/5/2024 to 2/10/2025.     Outpatient Physical Therapy 2 times weekly for 8 weeks to include the following interventions: Manual Therapy, Moist Heat/ Ice, Neuromuscular Re-ed, Patient  Education, Therapeutic Activities, and Therapeutic Exercise.       Julian Pulido, PT

## 2025-01-11 DIAGNOSIS — E78.2 MIXED HYPERLIPIDEMIA: ICD-10-CM

## 2025-01-11 NOTE — TELEPHONE ENCOUNTER
Care Due:                  Date            Visit Type   Department     Provider  --------------------------------------------------------------------------------                                MYCHART                              FOLLOWUP/OF  [facility] INTERNAL  Last Visit: 02-      FICE VISIT   MEDICINE       Ryder Herrera  Next Visit: None Scheduled  None         None Found                                                            Last  Test          Frequency    Reason                     Performed    Due Date  --------------------------------------------------------------------------------    CMP.........  12 months..  losartan, metFORMIN,       01- 12-                             pravastatin..............    HBA1C.......  6 months...  metFORMIN................  01- 06-    Lipid Panel.  12 months..  pravastatin..............  01- 12-    [facility] Embedded Care Due Messages. Reference number: [id number].   1/11/2025 2:58:40 PM CST

## 2025-01-12 NOTE — TELEPHONE ENCOUNTER
Refill Routing Note   Medication(s) are not appropriate for processing by [facility] for the following reason(s):      Required labs outdated    [facility] action(s):  Defer Care Due:  Labs due            Appointments  past 12m or future 3m with PCP    Date Provider   Last Visit   2/21/2024 Ryder Herrera MD   Next Visit   Visit date not found Ryder Herrera MD   ED visits in past 90 days: 0        Note composed:11:27 AM 01/12/2025

## 2025-01-13 ENCOUNTER — CLINICAL SUPPORT (OUTPATIENT)
Dept: REHABILITATION | Facility: HOSPITAL | Age: 55
End: 2025-01-13
Payer: COMMERCIAL

## 2025-01-13 DIAGNOSIS — M25.612 DECREASED ROM OF LEFT SHOULDER: Primary | ICD-10-CM

## 2025-01-13 PROCEDURE — 97140 MANUAL THERAPY 1/> REGIONS: CPT

## 2025-01-13 PROCEDURE — 97112 NEUROMUSCULAR REEDUCATION: CPT

## 2025-01-13 RX ORDER — PRAVASTATIN SODIUM 20 MG/1
20 TABLET ORAL EVERY MORNING
Qty: 90 TABLET | Refills: 0 | Status: SHIPPED | OUTPATIENT
Start: 2025-01-13

## 2025-01-13 NOTE — PROGRESS NOTES
[facility]   Physical Therapy Treatment Note     Name: Filiberto Cohn  [facility] Number: [id number]    Therapy Diagnosis:   Encounter Diagnosis   Name Primary?    Decreased ROM of left shoulder Yes       Physician: Renee Turner PA-C    Visit Date: 1/13/2025    Physician Orders: PT Eval and Treat   Medical Diagnosis from Referral:  Rotator cuff syndrome, left [M75.102], Adhesive capsulitis of left shoulder [M75.02], Chronic left shoulder pain [M25.512, G89.29]   Evaluation Date: 12/5/2024  Authorization Period Expiration: 12/31/2025  Plan of Care Expiration: 2/10/2025  Progress Note Due: 1/5/2025     Date of Surgery: n/a     Visit # / Visits authorized:  3/20  FOTO: 1/ 3     Precautions: Standard      Time In: 3:01 PM    Time Out: 4:01 PM   Total Billable Time: 60 minutes    Subjective     Patient reports: shoulder is feeling good. Better with reaching into his jacket.     He was compliant with home exercise program.    Response to previous treatment: mild soreness  Functional change: ongoing     Pain: 1/10  Location: left shoulder    Objective      Objective measures to be updated at progress note.      Treatment     Ulisses received the treatments listed below:      therapeutic exercises to develop strength, endurance, ROM, and flexibility for 00  minutes including:      Not performed today:  Table slides in scaption plane, 3 mins   Supine wand flexion AAROM, 2 x 15 with 2 lb dowel   Seated pulleys in flexion and scaption, 3 mins each   Sidelying thoracic rotation, 2 x 20   Seated thoracic extension, 2 x 15   Supine ER resisted GTB, 2 x 20   HEP update    manual therapy techniques: Joint mobilizations and Soft tissue Mobilization were applied to the: left shoulder for 09 minutes, including:    GH jt distraction, grades I/II  Posterior/inferior mobs grades II/III  Inferior GH ligament inferior glides with ER, grades III/IV    Not performed today:  Scap pinning with manual flexion   Scapular  "upward rotation, grades II/III    neuromuscular re-education activities to improve: Coordination, Sense, Proprioception, and Posture for 51 minutes. The following activities were included:    Wall slides for serratus activation and overhead reaching, 3 x 10  IR walkouts with lift to 90 degr BTB, 20x  ER iso with lift to 90 degr RTB, 20x   Prone mid trap holds level 3, 2 x 10 with 3" holds   Prone low trap holds level 3 with UE supported, 3 x 10   Sidelying 2# and 3#, 3 x 10  YTB banded oscillations at, below, and above shoulder height 3 x 10 small oscillations   Table plank with abduction resisted control. YTB 4 laps     Not performed today:  Flexion and Abduction isometrics, 5 x 10 sec holds  Scaption lift 1# Left UE and 2# Right UE, 2 x 10   Upper trap shrugs with 2# DB, 3 x 10 sec holds   IR active at 45 flexion, BTB, 20x   ER walkouts with lift to 60 degr GTB, 2 x 20    ER with 45 flexion, RTB, 2 x 10    Sidelying UT shrugs, 5 x 10 sec holds  Sidelying serratus protraction, 3 x 8 with 3" holds using dowel   Supine shoulder protraction at 90, raise to 110 with 1# ball, 2 x 20   Sidelying ER 0#,  2 x 12   ER/IR with GTB manual resisted perturbations, 2 x 10   Isometrics 5 x 10" holds in ER, IR and Abduction    Sidelying mid trap scap retraction, 5 x 10 sec holds     therapeutic activities to improve functional performance for 00  minutes, including:      Patient Education and Home Exercises       Education provided:   - HEP     Written Home Exercises Provided: Pt instructed to continue prior HEP. Exercises were reviewed and Ulisses was able to demonstrate them prior to the end of the session.  Ulisses demonstrated good  understanding of the education provided. See Electronic Medical Record under Patient Instructions for exercises provided during therapy sessions    Assessment     Mr. Gtz presents with near full active elevation to 170 degrees. He has quality passive ROM with minimal stiffness of inferior GH capsule that " limits his ER in an abducted plane. Able to improve periscapular strength and endurance with low trap today and will continue to progress with sidelying ER strength. No pain with abduction position endurance holds. Re-assessment at next visit.     Ulisses Is progressing well towards his goals.     Patient prognosis is Good.     Patient will continue to benefit from skilled outpatient physical therapy to address the deficits listed in the problem list box on initial evaluation, provide pt/family education and to maximize pt's level of independence in the home and community environment.     Patient's spiritual, cultural and educational needs considered and pt agreeable to plan of care and goals.     Anticipated barriers to physical therapy: none    Goals:   Short Term Goals:  4 weeks  1.Report decreased left shoulder pain < / =  2/10 to increase tolerance for progressing exercises in therapy. - MET (12/31/2024)   2. Increase PROM to 175 degr of flexion, 50 degr ER, and 25 degr IR pain-free in order to show improved joint mobility.  - Partially Met  3. Increased strength by 1/3 MMT grade in left shoulder flexion, ER, and abduction to increase tolerance for ADL and work activities. - MET (12/31/2024)   4. Pt to tolerate HEP to improve ROM and independence with ADL's - MET (12/31/2024)      Long Term Goals: 8 weeks  1.Report decreased left shoulder pain  < / =  1/10  to increase tolerance for progressing reaching and lifting exercises.  - Progressing, Not Met   2.Increase AROM to flexion 170 degr, abduction 170 degr, and ER 55 degr pain-free in order to show ability to reach overhead.  - Progressing, Not Met   3.Increase strength to >/= 4/5 in left shoulder flexion, ER, and abduction to increase tolerance for ADL and work activities. - Progressing, Not Met   4. Pt goal: be able to hold arms up to his side for mass, be able to reach overhead into cabinets. - Progressing, Not Met   5. Pt will have improved score of >/= 70% on  FOTO shoulder in order to demonstrate true functional improvement.   - Progressing, Not Met     Plan     Plan of care Certification: 12/5/2024 to 2/10/2025.     Outpatient Physical Therapy 2 times weekly for 8 weeks to include the following interventions: Manual Therapy, Moist Heat/ Ice, Neuromuscular Re-ed, Patient Education, Therapeutic Activities, and Therapeutic Exercise.       Julian Pulido, PT

## 2025-01-16 ENCOUNTER — CLINICAL SUPPORT (OUTPATIENT)
Dept: REHABILITATION | Facility: HOSPITAL | Age: 55
End: 2025-01-16
Payer: COMMERCIAL

## 2025-01-16 DIAGNOSIS — M25.612 DECREASED ROM OF LEFT SHOULDER: Primary | ICD-10-CM

## 2025-01-16 PROCEDURE — 97140 MANUAL THERAPY 1/> REGIONS: CPT

## 2025-01-16 PROCEDURE — 97110 THERAPEUTIC EXERCISES: CPT

## 2025-01-16 PROCEDURE — 97112 NEUROMUSCULAR REEDUCATION: CPT

## 2025-01-16 NOTE — PROGRESS NOTES
[facility]   Physical Therapy Treatment Note and Progress Note     Name: Filiberto Cohn  [facility] Number: [id number]    Therapy Diagnosis:   Encounter Diagnosis   Name Primary?    Decreased ROM of left shoulder Yes       Physician: Renee Turner PA-C    Visit Date: 1/16/2025    Physician Orders: PT Eval and Treat   Medical Diagnosis from Referral:  Rotator cuff syndrome, left [M75.102], Adhesive capsulitis of left shoulder [M75.02], Chronic left shoulder pain [M25.512, G89.29]   Evaluation Date: 12/5/2024  Authorization Period Expiration: 12/31/2025  Plan of Care Expiration: 2/10/2025  Progress Note Due: 2/10/2025       Visit # / Visits authorized:  4/20  FOTO: 1/ 3     Precautions: Standard      Time In: 2:00 PM    Time Out: 3:00 PM   Total Billable Time: 60 minutes    Subjective     Patient reports: shoulder is feeling good. Exercises are going well.     He was compliant with home exercise program.    Response to previous treatment: mild soreness  Functional change: ongoing     Pain: 1/10  Location: left shoulder    Objective      Re-assessment (1/16/2025)       Passive Range of Motion:   Shoulder Right Left   Flexion 180 170   Abduction 175 170   ER at 0 55 55   ER at 90 95 75   IR 30 25    *denotes pain      Active Range of Motion:   Shoulder Right Left   Flexion 175 170   Abduction 175 170   ER at 0 55 55   ER at 90 NT NT   IR 30 25   Reach behind head Full, pain-free Near full, intense stretch   Reach behind back  Full, pain-free Near full, no pain    *denotes pain      Strength:  Shoulder Right Left   Flexion 4+/5 4-/5    Abduction 4/5 4-/5    ER 4+/5 4-/5    IR 4+/5 4/5   Serratus Anterior 4-/5 4-/5   Middle Trap 4/5 4/5   Low Trap 4-/5 4-/5      Joint Mobility: limited but improved posterior/inferior GH jt mobility     Treatment     Ulisses received the treatments listed below:      therapeutic exercises to develop strength, endurance, ROM, and flexibility for 11 minutes  "including:    Re-assessment (noted above)   Sidelying IR stretch, 10 x 10 sec holds    Not performed today:  Sidelying thoracic rotation, 2 x 20   Seated thoracic extension, 2 x 15   Supine ER resisted GTB, 2 x 20   HEP update    manual therapy techniques: Joint mobilizations and Soft tissue Mobilization were applied to the: left shoulder for 09 minutes, including:    GH jt distraction, grades I/II  Posterior/inferior mobs grades II/III  Inferior GH ligament inferior glides with ER, grades III/IV    Not performed today:  Scap pinning with manual flexion   Scapular upward rotation, grades II/III    neuromuscular re-education activities to improve: Coordination, Sense, Proprioception, and Posture for 40 minutes. The following activities were included:    Wall slides for serratus activation and overhead reaching, 3 x 10  IR walkouts with lift to 90 degr BTB, 20x  ER iso with lift to 90 degr RTB, 20x   Prone mid trap holds level 3, 2 x 10 with 3" holds   Prone low trap holds level 3 with UE supported, 3 x 10   Sidelying ER with 2# and 3#, 3 x 10  Ball wall circles CW and CCW at 90 and 110 degr, 2 x 10     Not performed today:  Flexion and Abduction isometrics, 5 x 10 sec holds  YTB banded oscillations at, below, and above shoulder height 3 x 10 small oscillations   Table plank with abduction resisted control. YTB 4 laps   Scaption lift 1# Left UE and 2# Right UE, 2 x 10   Upper trap shrugs with 2# DB, 3 x 10 sec holds   IR active at 45 flexion, BTB, 20x   ER walkouts with lift to 60 degr GTB, 2 x 20    ER with 45 flexion, RTB, 2 x 10    ER/IR with GTB manual resisted perturbations, 2 x 10   Isometrics 5 x 10" holds in ER, IR and Abduction    Sidelying mid trap scap retraction, 5 x 10 sec holds     therapeutic activities to improve functional performance for 00  minutes, including:      Patient Education and Home Exercises       Education provided:   - HEP     Written Home Exercises Provided: Pt instructed to continue " prior HEP. Exercises were reviewed and Ulisses was able to demonstrate them prior to the end of the session.  Ulisses demonstrated good  understanding of the education provided. See Electronic Medical Record under Patient Instructions for exercises provided during therapy sessions    Assessment     Mr. Gtz presents for re-assessment and has completed 11 visits of therapy thus far for his left shoulder pain. He has improved active and passive range of motion tolerance with continued strength improvements and dynamic GH joint control and periscapular endurance. Patient has met most of his goals and will continue to work on remainder of his goals with lifting tolerance and endurance of overhead motions.     Ulisses Is progressing well towards his goals.     Patient prognosis is Good.     Patient will continue to benefit from skilled outpatient physical therapy to address the deficits listed in the problem list box on initial evaluation, provide pt/family education and to maximize pt's level of independence in the home and community environment.     Patient's spiritual, cultural and educational needs considered and pt agreeable to plan of care and goals.     Anticipated barriers to physical therapy: none    Goals:   Short Term Goals:  4 weeks  1.Report decreased left shoulder pain < / =  2/10 to increase tolerance for progressing exercises in therapy. - MET (12/31/2024)   2. Increase PROM to 175 degr of flexion, 50 degr ER, and 25 degr IR pain-free in order to show improved joint mobility.  - Partially Met  3. Increased strength by 1/3 MMT grade in left shoulder flexion, ER, and abduction to increase tolerance for ADL and work activities. - MET (12/31/2024)   4. Pt to tolerate HEP to improve ROM and independence with ADL's - MET (12/31/2024)      Long Term Goals: 8 weeks  1.Report decreased left shoulder pain  < / =  1/10  to increase tolerance for progressing reaching and lifting exercises.  - Met  (1/16/2025)   2.Increase AROM to  flexion 170 degr, abduction 170 degr, and ER 55 degr pain-free in order to show ability to reach overhead.  - Partially Met (1/16/2025)  3.Increase strength to >/= 4/5 in left shoulder flexion, ER, and abduction to increase tolerance for ADL and work activities. - Progressing, Not Met   4. Pt goal: be able to hold arms up to his side for mass, be able to reach overhead into cabinets. - Met  (1/16/2025)   5. Pt will have improved score of >/= 70% on FOTO shoulder in order to demonstrate true functional improvement.   - Progressing, Not Met     Plan     Plan of care Certification: 12/5/2024 to 2/10/2025.     Outpatient Physical Therapy 2 times weekly for 8 weeks to include the following interventions: Manual Therapy, Moist Heat/ Ice, Neuromuscular Re-ed, Patient Education, Therapeutic Activities, and Therapeutic Exercise.       Julian Pulido, PT

## 2025-01-23 ENCOUNTER — PATIENT MESSAGE (OUTPATIENT)
Dept: REHABILITATION | Facility: HOSPITAL | Age: 55
End: 2025-01-23
Payer: COMMERCIAL

## 2025-01-27 ENCOUNTER — CLINICAL SUPPORT (OUTPATIENT)
Dept: REHABILITATION | Facility: HOSPITAL | Age: 55
End: 2025-01-27
Payer: COMMERCIAL

## 2025-01-27 DIAGNOSIS — M25.612 DECREASED ROM OF LEFT SHOULDER: Primary | ICD-10-CM

## 2025-01-27 PROCEDURE — 97140 MANUAL THERAPY 1/> REGIONS: CPT

## 2025-01-27 PROCEDURE — 97112 NEUROMUSCULAR REEDUCATION: CPT

## 2025-01-27 PROCEDURE — 97110 THERAPEUTIC EXERCISES: CPT

## 2025-01-27 NOTE — PROGRESS NOTES
[facility]   Physical Therapy Treatment Note     Name: Filiberto Cohn  [facility] Number: [id number]    Therapy Diagnosis:   Encounter Diagnosis   Name Primary?    Decreased ROM of left shoulder Yes         Physician: Renee Turner PA-C    Visit Date: 1/27/2025    Physician Orders: PT Eval and Treat   Medical Diagnosis from Referral:  Rotator cuff syndrome, left [M75.102], Adhesive capsulitis of left shoulder [M75.02], Chronic left shoulder pain [M25.512, G89.29]   Evaluation Date: 12/5/2024  Authorization Period Expiration: 12/31/2025  Plan of Care Expiration: 2/10/2025  Progress Note Due: 2/10/2025       Visit # / Visits authorized:  5/20  FOTO: 1/ 3     Precautions: Standard      Time In: 2:00 PM    Time Out: 3:00 PM   Total Billable Time: 60 minutes    Subjective     Patient reports: shoulder is feeling good.  Exercises are going well. Moving next week on the 3rd/4th and leaving for [place] on the 5th/6th.     He was compliant with home exercise program.    Response to previous treatment: mild soreness  Functional change: ongoing     Pain: 1/10  Location: left shoulder    Objective      Re-assessment (1/27/2025)         Passive Range of Motion:   Shoulder Right Left   Flexion 180 170   Abduction 175 170   ER at 0 55 55   ER at 90 95 75   IR 30 25    *denotes pain      Active Range of Motion:   Shoulder Right Left   Flexion 175 170   Abduction 175 170   ER at 0 55 55   ER at 90 NT NT   IR 30 25   Reach behind head Full, pain-free Near full, intense stretch   Reach behind back  Full, pain-free Near full, no pain    *denotes pain      Strength:  Shoulder Right Left   Flexion 4+/5 4-/5    Abduction 4/5 4-/5    ER 4+/5 4-/5    IR 4+/5 4/5   Serratus Anterior 4-/5 4-/5   Middle Trap 4/5 4/5   Low Trap 4-/5 4-/5      Joint Mobility: limited but improved posterior/inferior GH jt mobility     Treatment     Ulisses received the treatments listed below:      therapeutic exercises to develop strength,  "endurance, ROM, and flexibility for 08 minutes including:    Sidelying IR stretch, 10 x 10 sec holds  HEP update     Not performed today:  Sidelying thoracic rotation, 2 x 20   Seated thoracic extension, 2 x 15   Supine ER resisted GTB, 2 x 20   HEP update    manual therapy techniques: Joint mobilizations and Soft tissue Mobilization were applied to the: left shoulder for 12 minutes, including:    GH jt distraction, grades I/II  Posterior/inferior mobs grades II/III  Inferior GH ligament inferior glides with ER, grades III/IV    Not performed today:  Scap pinning with manual flexion   Scapular upward rotation, grades II/III    neuromuscular re-education activities to improve: Coordination, Sense, Proprioception, and Posture for 40 minutes. The following activities were included:    Wall slides for serratus activation and overhead reaching, 3 x 10  Wall slides with lift off low trap YTB, 2 x 12  IR walkouts with lift to 90 degr BTB, 20x  ER iso with lift to 90 degr RTB, 20x   Prone mid trap holds level 3 with 1#, 2 x 10 with 3" holds   Prone low trap holds level 3 with UE supported, 3 x 10   Sidelying ER with 2# and 3#, 3 x 8 with each     Not performed today:  Flexion and Abduction isometrics, 5 x 10 sec holds  Ball wall circles CW and CCW at 90 and 110 degr, 2 x 10   YTB banded oscillations at, below, and above shoulder height 3 x 10 small oscillations   Table plank with abduction resisted control. YTB 4 laps   Scaption lift 1# Left UE and 2# Right UE, 2 x 10   Upper trap shrugs with 2# DB, 3 x 10 sec holds   IR active at 45 flexion, BTB, 20x   ER walkouts with lift to 60 degr GTB, 2 x 20    ER with 45 flexion, RTB, 2 x 10    ER/IR with GTB manual resisted perturbations, 2 x 10   Isometrics 5 x 10" holds in ER, IR and Abduction    Sidelying mid trap scap retraction, 5 x 10 sec holds     therapeutic activities to improve functional performance for 00  minutes, including:      Patient Education and Home Exercises   "     Education provided:   - HEP     Written Home Exercises Provided: Pt instructed to continue prior HEP. Exercises were reviewed and Ulisses was able to demonstrate them prior to the end of the session.  Ulisses demonstrated good  understanding of the education provided. See Electronic Medical Record under Patient Instructions for exercises provided during therapy sessions    Assessment     Mr. Gtz presents with improved active and passive flexion and ER range of motion. Has some mild end-range restrictions in capsular mobility preventing his end-range flexion range but able to show pain-free ER and IR strength testing as well today. Patient has good tolerance with continued strength improvements and dynamic GH joint control and periscapular endurance. Patient has met most of his goals and will continue to work on remainder of his goals with lifting tolerance and endurance of overhead motions.     Ulisses Is progressing well towards his goals.     Patient prognosis is Good.     Patient will continue to benefit from skilled outpatient physical therapy to address the deficits listed in the problem list box on initial evaluation, provide pt/family education and to maximize pt's level of independence in the home and community environment.     Patient's spiritual, cultural and educational needs considered and pt agreeable to plan of care and goals.     Anticipated barriers to physical therapy: none    Goals:   Short Term Goals:  4 weeks  1.Report decreased left shoulder pain < / =  2/10 to increase tolerance for progressing exercises in therapy. - MET (12/31/2024)   2. Increase PROM to 175 degr of flexion, 50 degr ER, and 25 degr IR pain-free in order to show improved joint mobility.  - Partially Met  3. Increased strength by 1/3 MMT grade in left shoulder flexion, ER, and abduction to increase tolerance for ADL and work activities. - MET (12/31/2024)   4. Pt to tolerate HEP to improve ROM and independence with ADL's - MET  (12/31/2024)      Long Term Goals: 8 weeks  1.Report decreased left shoulder pain  < / =  1/10  to increase tolerance for progressing reaching and lifting exercises.  - Met  (1/16/2025)   2.Increase AROM to flexion 170 degr, abduction 170 degr, and ER 55 degr pain-free in order to show ability to reach overhead.  - Partially Met (1/16/2025)  3.Increase strength to >/= 4/5 in left shoulder flexion, ER, and abduction to increase tolerance for ADL and work activities. - Progressing, Not Met   4. Pt goal: be able to hold arms up to his side for mass, be able to reach overhead into cabinets. - Met  (1/16/2025)   5. Pt will have improved score of >/= 70% on FOTO shoulder in order to demonstrate true functional improvement.   - Progressing, Not Met     Plan     Plan of care Certification: 12/5/2024 to 2/10/2025.     Outpatient Physical Therapy 2 times weekly for 8 weeks to include the following interventions: Manual Therapy, Moist Heat/ Ice, Neuromuscular Re-ed, Patient Education, Therapeutic Activities, and Therapeutic Exercise.       Julian Pulido, PT

## 2025-01-29 ENCOUNTER — OFFICE VISIT (OUTPATIENT)
Dept: SPORTS MEDICINE | Facility: CLINIC | Age: 55
End: 2025-01-29
Payer: COMMERCIAL

## 2025-01-29 VITALS
SYSTOLIC BLOOD PRESSURE: 123 MMHG | DIASTOLIC BLOOD PRESSURE: 84 MMHG | WEIGHT: 194 LBS | HEART RATE: 81 BPM | HEIGHT: 75 IN | BODY MASS INDEX: 24.12 KG/M2

## 2025-01-29 DIAGNOSIS — M75.102 ROTATOR CUFF SYNDROME, LEFT: ICD-10-CM

## 2025-01-29 DIAGNOSIS — E78.5 HYPERLIPIDEMIA ASSOCIATED WITH TYPE 2 DIABETES MELLITUS: ICD-10-CM

## 2025-01-29 DIAGNOSIS — E11.9 TYPE 2 DIABETES MELLITUS WITHOUT COMPLICATION: ICD-10-CM

## 2025-01-29 DIAGNOSIS — M25.512 CHRONIC PAIN IN LEFT SHOULDER: ICD-10-CM

## 2025-01-29 DIAGNOSIS — E11.69 HYPERLIPIDEMIA ASSOCIATED WITH TYPE 2 DIABETES MELLITUS: ICD-10-CM

## 2025-01-29 DIAGNOSIS — M75.02 ADHESIVE CAPSULITIS OF LEFT SHOULDER: Primary | ICD-10-CM

## 2025-01-29 DIAGNOSIS — G89.29 CHRONIC PAIN IN LEFT SHOULDER: ICD-10-CM

## 2025-01-29 PROCEDURE — 3008F BODY MASS INDEX DOCD: CPT | Mod: CPTII,S$GLB,, | Performed by: PHYSICIAN ASSISTANT

## 2025-01-29 PROCEDURE — 1159F MED LIST DOCD IN RCRD: CPT | Mod: CPTII,S$GLB,, | Performed by: PHYSICIAN ASSISTANT

## 2025-01-29 PROCEDURE — 3079F DIAST BP 80-89 MM HG: CPT | Mod: CPTII,S$GLB,, | Performed by: PHYSICIAN ASSISTANT

## 2025-01-29 PROCEDURE — 99999 PR PBB SHADOW E&M-EST. PATIENT-LVL III: CPT | Mod: PBBFAC,,, | Performed by: PHYSICIAN ASSISTANT

## 2025-01-29 PROCEDURE — 3074F SYST BP LT 130 MM HG: CPT | Mod: CPTII,S$GLB,, | Performed by: PHYSICIAN ASSISTANT

## 2025-01-29 PROCEDURE — 99214 OFFICE O/P EST MOD 30 MIN: CPT | Mod: S$GLB,,, | Performed by: PHYSICIAN ASSISTANT

## 2025-01-29 PROCEDURE — 1160F RVW MEDS BY RX/DR IN RCRD: CPT | Mod: CPTII,S$GLB,, | Performed by: PHYSICIAN ASSISTANT

## 2025-01-29 NOTE — PROGRESS NOTES
Subjective:     Chief Complaint: Filiberto Cohn is a 54 y.o. male who had concerns including Pain of the Left Shoulder.    Patient who is RHD and works as an [employer]  presents to clinic for follow up left shoulder.  Denies a specific mechanism of injury.  He has been attending formal PT at [facility] with Julian. He has noticed a significant improvement in his range of motion and decrease in his pain.  Pain at rest is 0/10.  Pain at its worst is 3/10.  He has is no longer taking any NSAIDS prn pain. He denies any pain with sleeping and minimal pain with lifting and overhead movements.  He is doing much better. He is moving to [place] next week.     Pain  Pertinent negatives include no abdominal pain, chest pain, chills, congestion, coughing, fever, headaches, joint swelling, myalgias, nausea, numbness, rash, sore throat or vomiting.       Review of Systems   Constitutional: Negative. Negative for chills, fever, weight gain and weight loss.   HENT:  Negative for congestion and sore throat.    Eyes:  Negative for blurred vision and double vision.   Cardiovascular:  Negative for chest pain, leg swelling and palpitations.   Respiratory:  Negative for cough and shortness of breath.    Hematologic/Lymphatic: Does not bruise/bleed easily.   Skin:  Negative for itching, poor wound healing and rash.   Musculoskeletal:  Positive for joint pain. Negative for back pain, joint swelling, muscle weakness, myalgias and stiffness.   Gastrointestinal:  Negative for abdominal pain, constipation, diarrhea, nausea and vomiting.   Genitourinary: Negative.  Negative for frequency and hematuria.   Neurological:  Negative for dizziness, headaches, numbness, paresthesias and sensory change.   Psychiatric/Behavioral:  Negative for altered mental status and depression. The patient is not nervous/anxious.    Allergic/Immunologic: Negative for hives.       Pain Related Questions  Over the past 3 days, what was your average pain  during activity? (I.e. running, jogging, walking, climbing stairs, getting dressed, ect.): 2  Over the past 3 days, what was your highest pain level?: 3  Over the past 3 days, what was your lowest pain level? : 2    Other  How many nights a week are you awakened by your affected body part?: 0  Was the patient's HEIGHT measured or patient reported?: Patient Reported  Was the patient's WEIGHT measured or patient reported?: Measured    Objective:     General: Filiberto is well-developed, well-nourished, appears stated age, in no acute distress, alert and oriented to time, place and person.     General    Vitals reviewed.  Constitutional: He is oriented to person, place, and time. He appears well-developed and well-nourished. No distress.   HENT:   Head: Normocephalic.   Eyes: EOM are normal.   Cardiovascular:  Normal rate and regular rhythm.            Pulmonary/Chest: Effort normal. No respiratory distress.   Neurological: He is alert and oriented to person, place, and time. He has normal reflexes. No cranial nerve deficit. Coordination normal.   Psychiatric: He has a normal mood and affect. His behavior is normal. Judgment and thought content normal.         Right Shoulder Exam     Inspection/Observation   Swelling: absent  Bruising: absent  Scars: absent  Deformity: absent  Scapular Winging: absent  Scapular Dyskinesia: negative  Atrophy: absent    Tenderness   The patient is experiencing no tenderness.    Range of Motion   Active abduction:  160 normal   Passive abduction:  160 normal   Extension:  30 normal   Forward Flexion:  180 normal   Adduction: 60 normal  External Rotation 0 degrees:  60 normal   External Rotation 90 degrees: 80 normal  Internal rotation 0 degrees:  T8 normal   Internal rotation 90 degrees:  60 normal     Muscle Strength   The patient has normal right shoulder strength.    Tests & Signs   Apprehension: negative  Cross arm: negative  Drop arm: negative  Harris test: negative  Impingement:  negative  Sulcus: absent  Lift Off Sign: negative  Active Compression Test ([place]mon's Sign): negative  Yergason's Test: negative  Speed's Test: negative  Anterior Drawer Test: 1+   Posterior Drawer Test: 1+    Other   Sensation: normal    Left Shoulder Exam     Inspection/Observation   Swelling: absent  Bruising: absent  Scars: absent  Deformity: absent  Scapular Winging: absent  Scapular Dyskinesia: negative  Atrophy: absent    Tenderness   The patient is experiencing no tenderness.     Range of Motion   Active abduction:  160 normal   Passive abduction:  160 normal   Extension:  30 normal   Forward Flexion:  160 abnormal   Adduction: 60 normal  External Rotation 0 degrees:  40 (pain) abnormal   External Rotation 90 degrees: 60 (pain) abnormal  Internal rotation 0 degrees:  T8 abnormal   Internal rotation 90 degrees:  40 (pain) abnormal     Tests & Signs   Apprehension: negative  Cross arm: positive  Drop arm: negative  Harris test: negative  Impingement: negative  Sulcus: absent  Rotator Cuff Painful Arc/Range: moderate  Lift Off Sign: negative  Active Compression Test ([place] Salmon's Sign): positive  Yergasons's Test: negative  Speed's Test: negative  Anterior Drawer Test: 1+  Posterior Drawer Test: 1+    Other   Sensation: normal       Muscle Strength   Right Upper Extremity   Shoulder Abduction: 5/5   Shoulder Internal Rotation: 5/5   Shoulder External Rotation: 5/5   Supraspinatus: 5/5   Subscapularis: 5/5   Biceps: 5/5   Left Upper Extremity  Shoulder Abduction: 4/5 (4+)   Shoulder Internal Rotation: 5/5   Shoulder External Rotation: 5/5   Supraspinatus: 4/5 (4+)   Subscapularis: 5/5   Biceps: 5/5     Reflexes     Left Side  Biceps:  2+  Triceps:  2+  Brachioradialis:  2+    Right Side   Biceps:  2+  Triceps:  2+  Brachioradialis:  2+    RADIOGRAPHS: 11/4/24  Left shoulder:  FINDINGS:  Mild DJD.  No fracture or dislocation.  No bone destruction identified.  No soft tissue calcifications.      Assessment:      Encounter Diagnoses   Name Primary?    Adhesive capsulitis of left shoulder Yes    Chronic pain in left shoulder     Rotator cuff syndrome, left        Plan:     We have discussed a variety of treatment options including medications, injections, physical therapy and other alternative treatments. I also explained the indications, risks and benefits of surgery. Given the patients hx and examination today, I believe he would benefit from continued physical therapy and possible intra-articular CSI left shoulder. Pt agrees and would like to proceed with this    I made the decision to obtain old records of the patient including previous notes and imaging. I independently reviewed and interpreted lab results today as well as prior imaging. Reviewed with patient in detail.    1. OTC NSAIDs as needed.  2. Ice compress to the affected area 2-3x a day for 15-20 minutes as needed for pain management.  3. Continue physical therapy for periscapular/rotator cuff strengthening and progress to HEP.   4. Consider CSI left shoulder intra-articular if symptoms worsen.  5. RTC prn. Patient is moving to [place] next week and will seek care there if needed.    All of the patient's questions were answered and the patient will contact us if they have any questions or concerns in the interim.          Patient questionnaires may have been collected.

## 2025-01-30 ENCOUNTER — CLINICAL SUPPORT (OUTPATIENT)
Dept: REHABILITATION | Facility: HOSPITAL | Age: 55
End: 2025-01-30
Payer: COMMERCIAL

## 2025-01-30 DIAGNOSIS — M25.612 DECREASED ROM OF LEFT SHOULDER: Primary | ICD-10-CM

## 2025-01-30 PROCEDURE — 97140 MANUAL THERAPY 1/> REGIONS: CPT

## 2025-01-30 PROCEDURE — 97112 NEUROMUSCULAR REEDUCATION: CPT

## 2025-01-30 PROCEDURE — 97110 THERAPEUTIC EXERCISES: CPT

## 2025-01-30 NOTE — PROGRESS NOTES
[facility]   Physical Therapy Treatment Note     Name: Filiberto Cohn  [facility] Number: [id number]    Therapy Diagnosis:   Encounter Diagnosis   Name Primary?    Decreased ROM of left shoulder Yes           Physician: Renee Turner PA-C    Visit Date: 1/30/2025    Physician Orders: PT Eval and Treat   Medical Diagnosis from Referral:  Rotator cuff syndrome, left [M75.102], Adhesive capsulitis of left shoulder [M75.02], Chronic left shoulder pain [M25.512, G89.29]   Evaluation Date: 12/5/2024  Authorization Period Expiration: 12/31/2025  Plan of Care Expiration: 2/10/2025  Progress Note Due: 2/10/2025       Visit # / Visits authorized:  6/20  FOTO: 1/ 3     Precautions: Standard      Time In: 2:50 PM    Time Out: 3:45 PM   Total Billable Time: 55 minutes    Subjective     Patient reports: shoulder is feeling good. Had a good follow up visit and will be moving to [place] next week. Feels comfortable with his exercises.     He was compliant with home exercise program.    Response to previous treatment: mild soreness  Functional change: ongoing     Pain: 1/10  Location: left shoulder    Objective      Re-assessment (1/27/2025)         Passive Range of Motion:   Shoulder Right Left   Flexion 180 170   Abduction 175 170   ER at 0 55 55   ER at 90 95 80   IR 30 25    *denotes pain      Active Range of Motion:   Shoulder Right Left   Flexion 175 170   Abduction 175 170   ER at 0 55 55   ER at 90 NT NT   IR 30 25   Reach behind head Full, pain-free Full, pain-free    Reach behind back  Full, pain-free Near full, no pain     *denotes pain      Strength:  Shoulder Right Left   Flexion 4+/5 4-/5    Abduction 4/5 4-/5    ER 4+/5 4-/5    IR 4+/5 4/5   Serratus Anterior 4-/5 4-/5   Middle Trap 4/5 4/5   Low Trap 4-/5 4-/5      Joint Mobility: limited but improved posterior/inferior GH jt mobility     Treatment     Ulisses received the treatments listed below:      therapeutic exercises to develop  "strength, endurance, ROM, and flexibility for 08 minutes including:    Sidelying IR stretch, 10 x 10 sec holds  HEP update     Not performed today:  Sidelying thoracic rotation, 2 x 20   Seated thoracic extension, 2 x 15   Supine ER resisted GTB, 2 x 20   HEP update    manual therapy techniques: Joint mobilizations and Soft tissue Mobilization were applied to the: left shoulder for 10 minutes, including:    GH jt distraction, grades I/II  Posterior/inferior mobs grades II/III  Inferior GH ligament inferior glides with ER, grades III/IV    Not performed today:  Scap pinning with manual flexion   Scapular upward rotation, grades II/III    neuromuscular re-education activities to improve: Coordination, Sense, Proprioception, and Posture for 37 minutes. The following activities were included:    Wall slides for serratus activation and overhead reaching, 3 x 10  Wall slides with lift off low trap YTB, 2 x 12  IR walkouts with lift to 90 degr BTB, 20x  ER iso with lift to 90 degr RTB, 20x   Prone mid trap holds level 3 with 1#, 2 x 10 with 3" holds   Sidelying shoulder flexion with abduction lowering, 3 x 8 YTB  Sidelying ER with 3# and 4#, 2 x 8 with each     Not performed today:  Prone low trap holds level 3 with UE supported, 3 x 10   Flexion and Abduction isometrics, 5 x 10 sec holds  Ball wall circles CW and CCW at 90 and 110 degr, 2 x 10   YTB banded oscillations at, below, and above shoulder height 3 x 10 small oscillations   Table plank with abduction resisted control. YTB 4 laps   Scaption lift 1# Left UE and 2# Right UE, 2 x 10   Upper trap shrugs with 2# DB, 3 x 10 sec holds   IR active at 45 flexion, BTB, 20x   ER walkouts with lift to 60 degr GTB, 2 x 20    ER with 45 flexion, RTB, 2 x 10    ER/IR with GTB manual resisted perturbations, 2 x 10   Isometrics 5 x 10" holds in ER, IR and Abduction    Sidelying mid trap scap retraction, 5 x 10 sec holds     therapeutic activities to improve functional performance " for 00  minutes, including:      Patient Education and Home Exercises       Education provided:   - HEP     Written Home Exercises Provided: Pt instructed to continue prior HEP. Exercises were reviewed and Ulisess was able to demonstrate them prior to the end of the session.  Ulisses demonstrated good  understanding of the education provided. See Electronic Medical Record under Patient Instructions for exercises provided during therapy sessions    Assessment     Mr. Gtz presents with improved active and passive flexion and ER range of motion that has improved in all planes of motion since beginning therapy. He conducted HEP well with good form and shows continued ability to improve ER in sidelying and in abduction without pain. Mild GH capsular joint mobility limitations remain however he has met all of his goals at this point of therapy. Patient to follow up with therapist as needed.      Ulisses Is progressing well towards his goals.     Patient prognosis is Good.     Patient will continue to benefit from skilled outpatient physical therapy to address the deficits listed in the problem list box on initial evaluation, provide pt/family education and to maximize pt's level of independence in the home and community environment.     Patient's spiritual, cultural and educational needs considered and pt agreeable to plan of care and goals.     Anticipated barriers to physical therapy: none    Goals:   Short Term Goals:  4 weeks  1.Report decreased left shoulder pain < / =  2/10 to increase tolerance for progressing exercises in therapy. - MET (12/31/2024)   2. Increase PROM to 175 degr of flexion, 50 degr ER, and 25 degr IR pain-free in order to show improved joint mobility.  - Met (1/30/2025)  3. Increased strength by 1/3 MMT grade in left shoulder flexion, ER, and abduction to increase tolerance for ADL and work activities. - MET (12/31/2024)   4. Pt to tolerate HEP to improve ROM and independence with ADL's - MET (12/31/2024)       Long Term Goals: 8 weeks  1.Report decreased left shoulder pain  < / =  1/10  to increase tolerance for progressing reaching and lifting exercises.  - Met  (1/16/2025)   2.Increase AROM to flexion 170 degr, abduction 170 degr, and ER 55 degr pain-free in order to show ability to reach overhead. - Met (1/30/2025)  3.Increase strength to >/= 4/5 in left shoulder flexion, ER, and abduction to increase tolerance for ADL and work activities. - Met (1/30/2025)  4. Pt goal: be able to hold arms up to his side for mass, be able to reach overhead into cabinets. - Met  (1/16/2025)   5. Pt will have improved score of >/= 70% on FOTO shoulder in order to demonstrate true functional improvement.  - Met (1/30/2025)    Plan     Plan of care Certification: 12/5/2024 to 2/10/2025.     Outpatient Physical Therapy 2 times weekly for 8 weeks to include the following interventions: Manual Therapy, Moist Heat/ Ice, Neuromuscular Re-ed, Patient Education, Therapeutic Activities, and Therapeutic Exercise.       Julian Pulido, PT

## 2025-02-25 ENCOUNTER — TELEPHONE (OUTPATIENT)
Dept: INTERNAL MEDICINE | Facility: CLINIC | Age: 55
End: 2025-02-25
Payer: COMMERCIAL

## 2025-03-05 ENCOUNTER — PATIENT OUTREACH (OUTPATIENT)
Dept: INTERNAL MEDICINE | Facility: CLINIC | Age: 55
End: 2025-03-05

## 2025-03-05 VITALS — SYSTOLIC BLOOD PRESSURE: 123 MMHG | DIASTOLIC BLOOD PRESSURE: 84 MMHG

## 2025-03-21 ENCOUNTER — PATIENT MESSAGE (OUTPATIENT)
Dept: INTERNAL MEDICINE | Facility: CLINIC | Age: 55
End: 2025-03-21

## 2025-03-21 DIAGNOSIS — E13.9 LADA (LATENT AUTOIMMUNE DIABETES IN ADULTS), MANAGED AS TYPE 2: ICD-10-CM

## 2025-03-23 RX ORDER — SEMAGLUTIDE 2.68 MG/ML
2 INJECTION, SOLUTION SUBCUTANEOUS WEEKLY
Qty: 9 ML | Refills: 1 | Status: SHIPPED | OUTPATIENT
Start: 2025-03-23

## 2025-03-23 RX ORDER — INSULIN LISPRO-AABC 100 [IU]/ML
INJECTION, SOLUTION SUBCUTANEOUS
Qty: 18 PEN | Refills: 3 | Status: SHIPPED | OUTPATIENT
Start: 2025-03-23

## 2025-03-24 ENCOUNTER — PATIENT MESSAGE (OUTPATIENT)
Dept: INTERNAL MEDICINE | Facility: CLINIC | Age: 55
End: 2025-03-24

## 2025-03-24 RX ORDER — INSULIN GLARGINE 300 [IU]/ML
30 INJECTION, SOLUTION SUBCUTANEOUS DAILY
Qty: 9 ML | Refills: 1 | Status: SHIPPED | OUTPATIENT
Start: 2025-03-24 | End: 2025-03-28 | Stop reason: SDUPTHER

## 2025-03-28 RX ORDER — INSULIN GLARGINE 300 [IU]/ML
30 INJECTION, SOLUTION SUBCUTANEOUS DAILY
Qty: 9 ML | Refills: 1 | Status: SHIPPED | OUTPATIENT
Start: 2025-03-28 | End: 2026-03-28

## 2025-04-03 ENCOUNTER — TELEPHONE (OUTPATIENT)
Dept: INTERNAL MEDICINE | Facility: CLINIC | Age: 55
End: 2025-04-03

## 2025-04-07 DIAGNOSIS — E78.2 MIXED HYPERLIPIDEMIA: ICD-10-CM

## 2025-04-08 RX ORDER — PRAVASTATIN SODIUM 20 MG/1
20 TABLET ORAL EVERY MORNING
Qty: 90 TABLET | Refills: 0 | Status: SHIPPED | OUTPATIENT
Start: 2025-04-08

## 2025-04-08 NOTE — TELEPHONE ENCOUNTER
Refill Routing Note   Medication(s) are not appropriate for processing by [facility] for the following reason(s):        Required labs outdated  Responsible provider unclear    [facility] action(s):  Defer               Appointments  past 12m or future 3m with PCP    Date Provider   Last Visit   2/21/2024 Ryder Herrera MD   Next Visit   Visit date not found Ryder Herrera MD   ED visits in past 90 days: 0        Note composed:7:53 AM 04/08/2025

## 2025-07-11 DIAGNOSIS — E78.2 MIXED HYPERLIPIDEMIA: ICD-10-CM

## 2025-07-12 NOTE — TELEPHONE ENCOUNTER
Refill Routing Note   Medication(s) are not appropriate for processing by [facility] for the following reason(s):        Responsible provider unclear-no designated PCP    [facility] action(s):  Route        Medication Therapy Plan: no PCP      Appointments  past 12m or future 3m with PCP    Date Provider   Last Visit   2/21/2024 Ryder Herrera MD   Next Visit   Visit date not found Ryder Herrera MD   ED visits in past 90 days: 0        Note composed:10:20 AM 07/12/2025

## 2025-07-14 RX ORDER — PRAVASTATIN SODIUM 20 MG/1
20 TABLET ORAL EVERY MORNING
Qty: 90 TABLET | Refills: 0 | Status: SHIPPED | OUTPATIENT
Start: 2025-07-14

## 2025-08-25 DIAGNOSIS — E13.9 LADA (LATENT AUTOIMMUNE DIABETES IN ADULTS), MANAGED AS TYPE 2: ICD-10-CM

## 2025-08-26 RX ORDER — SEMAGLUTIDE 2.68 MG/ML
INJECTION, SOLUTION SUBCUTANEOUS
Qty: 9 ML | Refills: 3 | Status: SHIPPED | OUTPATIENT
Start: 2025-08-26